# Patient Record
Sex: MALE | Race: BLACK OR AFRICAN AMERICAN | NOT HISPANIC OR LATINO | Employment: UNEMPLOYED | URBAN - METROPOLITAN AREA
[De-identification: names, ages, dates, MRNs, and addresses within clinical notes are randomized per-mention and may not be internally consistent; named-entity substitution may affect disease eponyms.]

---

## 2022-09-27 ENCOUNTER — HOSPITAL ENCOUNTER (EMERGENCY)
Facility: HOSPITAL | Age: 67
Discharge: HOME | End: 2022-09-27
Attending: FAMILY MEDICINE
Payer: MEDICARE

## 2022-09-27 ENCOUNTER — OFFICE VISIT (OUTPATIENT)
Dept: URGENT CARE | Facility: CLINIC | Age: 67
End: 2022-09-27
Payer: MEDICARE

## 2022-09-27 VITALS
TEMPERATURE: 98 F | RESPIRATION RATE: 18 BRPM | HEART RATE: 70 BPM | OXYGEN SATURATION: 100 % | HEIGHT: 69 IN | WEIGHT: 154 LBS | SYSTOLIC BLOOD PRESSURE: 205 MMHG | BODY MASS INDEX: 22.81 KG/M2 | DIASTOLIC BLOOD PRESSURE: 107 MMHG

## 2022-09-27 VITALS
HEART RATE: 66 BPM | TEMPERATURE: 98 F | RESPIRATION RATE: 18 BRPM | BODY MASS INDEX: 22.85 KG/M2 | SYSTOLIC BLOOD PRESSURE: 186 MMHG | OXYGEN SATURATION: 100 % | HEIGHT: 69 IN | WEIGHT: 154.31 LBS | DIASTOLIC BLOOD PRESSURE: 95 MMHG

## 2022-09-27 DIAGNOSIS — R42 LIGHT HEADED: ICD-10-CM

## 2022-09-27 DIAGNOSIS — I10 HYPERTENSION, UNSPECIFIED TYPE: Primary | ICD-10-CM

## 2022-09-27 PROCEDURE — 99213 OFFICE O/P EST LOW 20 MIN: CPT | Mod: PBBFAC | Performed by: NURSE PRACTITIONER

## 2022-09-27 PROCEDURE — 99213 PR OFFICE/OUTPT VISIT, EST, LEVL III, 20-29 MIN: ICD-10-PCS | Mod: S$PBB,,, | Performed by: NURSE PRACTITIONER

## 2022-09-27 PROCEDURE — 99213 OFFICE O/P EST LOW 20 MIN: CPT | Mod: S$PBB,,, | Performed by: NURSE PRACTITIONER

## 2022-09-27 PROCEDURE — 99283 EMERGENCY DEPT VISIT LOW MDM: CPT | Mod: 27

## 2022-09-27 PROCEDURE — 25000003 PHARM REV CODE 250: Performed by: FAMILY MEDICINE

## 2022-09-27 RX ORDER — LISINOPRIL 40 MG/1
40 TABLET ORAL
COMMUNITY
Start: 2021-12-28 | End: 2022-09-27 | Stop reason: SDUPTHER

## 2022-09-27 RX ORDER — LISINOPRIL 40 MG/1
40 TABLET ORAL DAILY
Qty: 90 TABLET | Refills: 0 | Status: SHIPPED | OUTPATIENT
Start: 2022-09-27 | End: 2024-02-15 | Stop reason: SDUPTHER

## 2022-09-27 RX ORDER — ACETAMINOPHEN 500 MG
1000 TABLET ORAL
Status: COMPLETED | OUTPATIENT
Start: 2022-09-27 | End: 2022-09-27

## 2022-09-27 RX ORDER — LISINOPRIL 10 MG/1
40 TABLET ORAL
Status: COMPLETED | OUTPATIENT
Start: 2022-09-27 | End: 2022-09-27

## 2022-09-27 RX ADMIN — ACETAMINOPHEN 1000 MG: 500 TABLET ORAL at 10:09

## 2022-09-27 RX ADMIN — LISINOPRIL 40 MG: 10 TABLET ORAL at 10:09

## 2022-09-28 DIAGNOSIS — F17.200 NEEDS SMOKING CESSATION EDUCATION: ICD-10-CM

## 2022-09-28 NOTE — ED PROVIDER NOTES
Encounter Date: 9/27/2022       History     Chief Complaint   Patient presents with    Hypertension     HTN and headache. Ran out of meds x2-3 days     67-year-old gentleman presents emergency room with complaints of elevated blood pressure, currently out of his blood pressure medications for last 2-3 days.  Denies chest pain or shortness of breath.  Patient reports a headache currently 4/10 in intensity.  Nothing makes better or worse.    The history is provided by the patient.   Review of patient's allergies indicates:  No Known Allergies  Past Medical History:   Diagnosis Date    HTN (hypertension)      Past Surgical History:   Procedure Laterality Date    ORIF HIP FRACTURE Right      History reviewed. No pertinent family history.  Social History     Tobacco Use    Smoking status: Every Day     Packs/day: 1.00     Types: Cigarettes    Smokeless tobacco: Never   Substance Use Topics    Alcohol use: Not Currently    Drug use: Never     Review of Systems   Constitutional:  Negative for chills, fatigue and fever.   HENT:  Negative for ear pain, rhinorrhea and sore throat.    Eyes:  Negative for photophobia and pain.   Respiratory:  Negative for cough, shortness of breath and wheezing.    Cardiovascular:  Negative for chest pain.   Gastrointestinal:  Negative for abdominal pain, diarrhea, nausea and vomiting.   Genitourinary:  Negative for dysuria.   Neurological:  Positive for headaches. Negative for dizziness and weakness.   All other systems reviewed and are negative.    Physical Exam     Initial Vitals [09/27/22 2124]   BP Pulse Resp Temp SpO2   (!) 211/103 68 20 97.2 °F (36.2 °C) 100 %      MAP       --         Physical Exam    Nursing note and vitals reviewed.  Constitutional: He appears well-developed and well-nourished.   HENT:   Head: Normocephalic and atraumatic.   Eyes: EOM are normal. Pupils are equal, round, and reactive to light.   Neck: Neck supple.   Normal range of motion.  Cardiovascular:  Normal  rate, regular rhythm and normal heart sounds.     Exam reveals no gallop and no friction rub.       No murmur heard.  Pulmonary/Chest: Breath sounds normal. No respiratory distress.   Abdominal: Abdomen is soft. Bowel sounds are normal. He exhibits no distension. There is no abdominal tenderness.   Musculoskeletal:         General: Normal range of motion.      Cervical back: Normal range of motion and neck supple.     Neurological: He is alert and oriented to person, place, and time. He has normal strength.   Skin: Skin is warm and dry.   Psychiatric: He has a normal mood and affect. His behavior is normal. Judgment and thought content normal.       ED Course   Procedures  Labs Reviewed - No data to display         Imaging Results    None          Medications   lisinopriL tablet 40 mg (has no administration in time range)   acetaminophen tablet 1,000 mg (has no administration in time range)     Medical Decision Making:   Initial Assessment:   Patient currently in no acute distress; discussed with patient about obtain laboratory evaluation, patient does not desire.  Will schedule patient for follow-up in internal medicine clinic.                        Clinical Impression:   Final diagnoses:  [I10] Hypertension, unspecified type (Primary)      ED Disposition Condition    Discharge Stable          ED Prescriptions       Medication Sig Dispense Start Date End Date Auth. Provider    lisinopriL (PRINIVIL,ZESTRIL) 40 MG tablet Take 1 tablet (40 mg total) by mouth once daily. 90 tablet 9/27/2022 12/26/2022 Robert Tillman MD          Follow-up Information       Follow up With Specialties Details Why Contact Info Additional Information    [facility] - Emergency Dept Emergency Medicine  As needed, If symptoms worsen [address] [facility] [address] [phone]     [facility] - Internal Medicine Internal Medicine  Next available. [address] [facility]  [address]  [phone] Internal Medicine Clinic Entrance #1             Robert Tillman MD  09/27/22 6871

## 2022-09-28 NOTE — PROGRESS NOTES
"Subjective:       Patient ID: Heath Holder is a 67 y.o. male.    Vitals:  height is 5' 9" (1.753 m) and weight is 69.9 kg (154 lb). His temperature is 98.1 °F (36.7 °C). His blood pressure is 205/107 (abnormal) and his pulse is 70. His respiration is 18 and oxygen saturation is 100%.     Chief Complaint: Medication Refill    Patient is a 67-year-old male, here today for blood pressure medication refilled for high blood pressure.  Patient states he has been feeling lightheaded the past few days, has been off his medication over the past few days.  States he does have shortness of breath, but states that is at baseline.        Constitution: Negative.   Cardiovascular: Negative.    Respiratory:  Positive for cough.    Neurological:  Positive for light-headedness.     Objective:      Physical Exam   Constitutional: He is oriented to person, place, and time. He appears well-developed.   HENT:   Head: Normocephalic.   Eyes: Conjunctivae and EOM are normal. Pupils are equal, round, and reactive to light.   Neck: Neck supple.   Cardiovascular: Normal rate, regular rhythm and normal heart sounds.   Pulmonary/Chest: Effort normal and breath sounds normal.   Musculoskeletal: Normal range of motion.         General: Normal range of motion.   Neurological: He is alert and oriented to person, place, and time.   Skin: Skin is warm and dry.   Psychiatric: His behavior is normal.   Vitals reviewed.      Assessment:       1. Hypertension, unspecified type    2. Light headed              No visits with results within 1 Day(s) from this visit.   Latest known visit with results is:   No results found for any previous visit.        No results found.   Plan:           Discussed with patient, will transfer to ED for further evaluation treatment. Pt transferred via w/c by [facility] staff.   Hypertension, unspecified type  -     Refer to Emergency Dept.    Light headed  -     Refer to Emergency Dept.                       "

## 2022-10-06 ENCOUNTER — HOSPITAL ENCOUNTER (EMERGENCY)
Facility: HOSPITAL | Age: 67
Discharge: HOME | End: 2022-10-06
Attending: FAMILY MEDICINE
Payer: MEDICARE

## 2022-10-06 VITALS
WEIGHT: 155.19 LBS | DIASTOLIC BLOOD PRESSURE: 86 MMHG | HEART RATE: 57 BPM | RESPIRATION RATE: 18 BRPM | OXYGEN SATURATION: 98 % | HEIGHT: 69 IN | TEMPERATURE: 98 F | SYSTOLIC BLOOD PRESSURE: 167 MMHG | BODY MASS INDEX: 22.98 KG/M2

## 2022-10-06 DIAGNOSIS — B34.9 VIRAL SYNDROME: Primary | ICD-10-CM

## 2022-10-06 LAB
ALBUMIN SERPL-MCNC: 3.5 GM/DL (ref 3.4–4.8)
ALBUMIN/GLOB SERPL: 1.1 RATIO (ref 1.1–2)
ALP SERPL-CCNC: 104 UNIT/L (ref 40–150)
ALT SERPL-CCNC: 42 UNIT/L (ref 0–55)
APPEARANCE UR: CLEAR
AST SERPL-CCNC: 47 UNIT/L (ref 5–34)
BACTERIA #/AREA URNS AUTO: ABNORMAL /HPF
BASOPHILS # BLD AUTO: 0.03 X10(3)/MCL (ref 0–0.2)
BASOPHILS NFR BLD AUTO: 0.5 %
BILIRUB UR QL STRIP.AUTO: NEGATIVE MG/DL
BILIRUBIN DIRECT+TOT PNL SERPL-MCNC: 0.8 MG/DL
BUN SERPL-MCNC: 13.3 MG/DL (ref 8.4–25.7)
CALCIUM SERPL-MCNC: 9.7 MG/DL (ref 8.8–10)
CHLORIDE SERPL-SCNC: 100 MMOL/L (ref 98–107)
CO2 SERPL-SCNC: 32 MMOL/L (ref 23–31)
COLOR UR AUTO: YELLOW
CREAT SERPL-MCNC: 0.86 MG/DL (ref 0.73–1.18)
EOSINOPHIL # BLD AUTO: 0.05 X10(3)/MCL (ref 0–0.9)
EOSINOPHIL NFR BLD AUTO: 0.9 %
ERYTHROCYTE [DISTWIDTH] IN BLOOD BY AUTOMATED COUNT: 12 % (ref 11.5–17)
FLUAV AG UPPER RESP QL IA.RAPID: NOT DETECTED
FLUBV AG UPPER RESP QL IA.RAPID: NOT DETECTED
GFR SERPLBLD CREATININE-BSD FMLA CKD-EPI: >60 MLS/MIN/1.73/M2
GLOBULIN SER-MCNC: 3.3 GM/DL (ref 2.4–3.5)
GLUCOSE SERPL-MCNC: 131 MG/DL (ref 82–115)
GLUCOSE UR QL STRIP.AUTO: ABNORMAL MG/DL
HCT VFR BLD AUTO: 56 % (ref 42–52)
HGB BLD-MCNC: 18.4 GM/DL (ref 14–18)
HYALINE CASTS #/AREA URNS LPF: ABNORMAL /LPF
IMM GRANULOCYTES # BLD AUTO: 0.01 X10(3)/MCL (ref 0–0.04)
IMM GRANULOCYTES NFR BLD AUTO: 0.2 %
KETONES UR QL STRIP.AUTO: NEGATIVE MG/DL
LEUKOCYTE ESTERASE UR QL STRIP.AUTO: NEGATIVE UNIT/L
LYMPHOCYTES # BLD AUTO: 2.13 X10(3)/MCL (ref 0.6–4.6)
LYMPHOCYTES NFR BLD AUTO: 38.8 %
MCH RBC QN AUTO: 30.7 PG (ref 27–31)
MCHC RBC AUTO-ENTMCNC: 32.9 MG/DL (ref 33–36)
MCV RBC AUTO: 93.3 FL (ref 80–94)
MONOCYTES # BLD AUTO: 0.63 X10(3)/MCL (ref 0.1–1.3)
MONOCYTES NFR BLD AUTO: 11.5 %
MUCOUS THREADS URNS QL MICRO: ABNORMAL /LPF
NEUTROPHILS # BLD AUTO: 2.6 X10(3)/MCL (ref 2.1–9.2)
NEUTROPHILS NFR BLD AUTO: 48.1 %
NITRITE UR QL STRIP.AUTO: NEGATIVE
NRBC BLD AUTO-RTO: 0 %
PH UR STRIP.AUTO: 7 [PH]
PLATELET # BLD AUTO: 166 X10(3)/MCL (ref 130–400)
PMV BLD AUTO: 10.8 FL (ref 7.4–10.4)
POTASSIUM SERPL-SCNC: 4.9 MMOL/L (ref 3.5–5.1)
PROT SERPL-MCNC: 6.8 GM/DL (ref 5.8–7.6)
PROT UR QL STRIP.AUTO: ABNORMAL MG/DL
RBC # BLD AUTO: 6 X10(6)/MCL (ref 4.7–6.1)
RBC #/AREA URNS AUTO: ABNORMAL /HPF
RBC UR QL AUTO: NEGATIVE UNIT/L
SARS-COV-2 RNA RESP QL NAA+PROBE: NOT DETECTED
SODIUM SERPL-SCNC: 140 MMOL/L (ref 136–145)
SP GR UR STRIP.AUTO: 1.02
SQUAMOUS #/AREA URNS LPF: ABNORMAL /HPF
TROPONIN I SERPL-MCNC: 0.01 NG/ML (ref 0–0.04)
UROBILINOGEN UR STRIP-ACNC: ABNORMAL MG/DL
WBC # SPEC AUTO: 5.5 X10(3)/MCL (ref 4.5–11.5)
WBC #/AREA URNS AUTO: ABNORMAL /HPF

## 2022-10-06 PROCEDURE — 93005 ELECTROCARDIOGRAM TRACING: CPT

## 2022-10-06 PROCEDURE — 0241U COVID/FLU A&B PCR: CPT | Performed by: PHYSICIAN ASSISTANT

## 2022-10-06 PROCEDURE — 99285 EMERGENCY DEPT VISIT HI MDM: CPT | Mod: 25,CS

## 2022-10-06 PROCEDURE — 84484 ASSAY OF TROPONIN QUANT: CPT | Performed by: PHYSICIAN ASSISTANT

## 2022-10-06 PROCEDURE — 85610 PROTHROMBIN TIME: CPT | Performed by: PHYSICIAN ASSISTANT

## 2022-10-06 PROCEDURE — 85025 COMPLETE CBC W/AUTO DIFF WBC: CPT | Performed by: PHYSICIAN ASSISTANT

## 2022-10-06 PROCEDURE — 36415 COLL VENOUS BLD VENIPUNCTURE: CPT | Performed by: PHYSICIAN ASSISTANT

## 2022-10-06 PROCEDURE — 25000003 PHARM REV CODE 250: Performed by: PHYSICIAN ASSISTANT

## 2022-10-06 PROCEDURE — 80053 COMPREHEN METABOLIC PANEL: CPT | Performed by: PHYSICIAN ASSISTANT

## 2022-10-06 PROCEDURE — 81001 URINALYSIS AUTO W/SCOPE: CPT | Performed by: PHYSICIAN ASSISTANT

## 2022-10-06 RX ORDER — ONDANSETRON 4 MG/1
4 TABLET, ORALLY DISINTEGRATING ORAL EVERY 8 HOURS PRN
Qty: 9 TABLET | Refills: 0 | Status: SHIPPED | OUTPATIENT
Start: 2022-10-06 | End: 2022-10-09

## 2022-10-06 RX ORDER — METHOCARBAMOL 500 MG/1
500 TABLET, FILM COATED ORAL 3 TIMES DAILY
Qty: 9 TABLET | Refills: 0 | Status: SHIPPED | OUTPATIENT
Start: 2022-10-06 | End: 2022-10-09

## 2022-10-06 RX ORDER — ONDANSETRON 4 MG/1
4 TABLET, ORALLY DISINTEGRATING ORAL
Status: COMPLETED | OUTPATIENT
Start: 2022-10-06 | End: 2022-10-06

## 2022-10-06 RX ORDER — HYDROCODONE BITARTRATE AND ACETAMINOPHEN 5; 325 MG/1; MG/1
1 TABLET ORAL ONCE
Status: COMPLETED | OUTPATIENT
Start: 2022-10-06 | End: 2022-10-06

## 2022-10-06 RX ADMIN — ONDANSETRON 4 MG: 4 TABLET, ORALLY DISINTEGRATING ORAL at 01:10

## 2022-10-06 RX ADMIN — HYDROCODONE BITARTRATE AND ACETAMINOPHEN 1 TABLET: 5; 325 TABLET ORAL at 01:10

## 2022-10-06 NOTE — ED PROVIDER NOTES
Encounter Date: 10/6/2022       History     Chief Complaint   Patient presents with    Nausea     Nausea, diarrhea, dyspnea since yesterday.    Diarrhea    Shortness of Breath     Patient reports nausea, diarrhea, and some sob for the past x2 days - pt reports his wife was recently diagnosed with covid    The history is provided by the patient.   Nausea  This is a new problem. The current episode started yesterday. The problem has not changed since onset.Pertinent negatives include no chest pain.   Review of patient's allergies indicates:  No Known Allergies  Past Medical History:   Diagnosis Date    Diabetes mellitus     HTN (hypertension)      Past Surgical History:   Procedure Laterality Date    ORIF HIP FRACTURE Right      History reviewed. No pertinent family history.  Social History     Tobacco Use    Smoking status: Every Day     Packs/day: 0.75     Types: Cigarettes    Smokeless tobacco: Never   Substance Use Topics    Alcohol use: Not Currently    Drug use: Never     Review of Systems   Constitutional:  Negative for fever.   Eyes: Negative.    Cardiovascular:  Negative for chest pain.   Genitourinary:  Negative for dysuria.   Musculoskeletal:  Negative for back pain.   Skin:  Negative for rash.   Neurological:  Negative for weakness.   Hematological:  Does not bruise/bleed easily.   Psychiatric/Behavioral: Negative.       Physical Exam     Initial Vitals [10/06/22 0945]   BP Pulse Resp Temp SpO2   (!) 203/85 69 18 98.1 °F (36.7 °C) 100 %      MAP       --         Physical Exam    Vitals reviewed.  Constitutional: He appears well-developed.   HENT:   Head: Normocephalic and atraumatic.   Nose: Nose normal.   Eyes: Conjunctivae and EOM are normal. Pupils are equal, round, and reactive to light.   Neck:   Normal range of motion.  Cardiovascular:  Normal rate, regular rhythm and normal heart sounds.           Pulmonary/Chest: Breath sounds normal. He exhibits no tenderness.   Abdominal: Abdomen is soft. Bowel  sounds are normal. He exhibits no distension. There is no abdominal tenderness. There is no rebound and no guarding.   Musculoskeletal:         General: Normal range of motion.      Cervical back: Normal range of motion.     Neurological: He is alert and oriented to person, place, and time. He displays normal reflexes. No cranial nerve deficit or sensory deficit. GCS score is 15. GCS eye subscore is 4. GCS verbal subscore is 5. GCS motor subscore is 6.   Skin: Skin is warm. No pallor.   Psychiatric: He has a normal mood and affect. His behavior is normal. Judgment and thought content normal.       ED Course   Procedures  Labs Reviewed   COMPREHENSIVE METABOLIC PANEL - Abnormal; Notable for the following components:       Result Value    Carbon Dioxide 32 (*)     Glucose Level 131 (*)     Aspartate Aminotransferase 47 (*)     All other components within normal limits   URINALYSIS, REFLEX TO URINE CULTURE - Abnormal; Notable for the following components:    Protein, UA Trace (*)     Glucose, UA 3+ (*)     Urobilinogen, UA 2+ (*)     Squamous Epithelial Cells, UA Trace (*)     Mucous, UA Trace (*)     All other components within normal limits   CBC WITH DIFFERENTIAL - Abnormal; Notable for the following components:    Hgb 18.4 (*)     Hct 56.0 (*)     MCHC 32.9 (*)     MPV 10.8 (*)     All other components within normal limits   COVID/FLU A&B PCR - Normal   TROPONIN I - Normal   CBC W/ AUTO DIFFERENTIAL    Narrative:     The following orders were created for panel order CBC auto differential.  Procedure                               Abnormality         Status                     ---------                               -----------         ------                     CBC with Differential[[id number]]        Abnormal            Final result                 Please view results for these tests on the individual orders.   PROTIME-INR   EXTRA TUBES    Narrative:     The following orders were created for panel order EXTRA  TUBES.  Procedure                               Abnormality         Status                     ---------                               -----------         ------                     Red Top Hold[[id number]]                                     In process                   Please view results for these tests on the individual orders.   RED TOP HOLD        ECG Results              EKG 12-lead (Final result)  Result time 10/06/22 13:42:04      Final result by Interface, Lab [facility] (10/06/22 13:42:04)                   Narrative:    Test Reason : R06.02,    Vent. Rate : 059 BPM     Atrial Rate : 059 BPM     P-R Int : 124 ms          QRS Dur : 088 ms      QT Int : 398 ms       P-R-T Axes : 085 083 075 degrees     QTc Int : 394 ms    Sinus bradycardia  Possible Left atrial enlargement  Borderline Abnormal ECG  No previous ECGs available  Confirmed by Bertha Scales MD ([id number]) on 10/6/2022 1:41:54 PM    Referred By: [email or web address]   SELF           Confirmed By:Bertha Scales MD                                  Imaging Results              XR ABDOMEN  ACUTE 2 OR MORE VIEWS WITH CHEST (Final result)  Result time 10/06/22 13:08:55      Final result by Pedro Garcia MD (10/06/22 13:08:55)                   Impression:      No acute findings identified.      Electronically signed by: Pedro Garcia  Date:    10/06/2022  Time:    13:08               Narrative:    EXAMINATION:  XR ABDOMEN ACUTE 2 OR MORE VIEWS WITH CHEST    CLINICAL HISTORY:  nausea/diarrhea/sob;    TECHNIQUE:  Two-view    COMPARISON:  August 23, 2011    FINDINGS:  Cardiopericardial silhouette is within normal limits.  Lungs are well expanded and clear and there is no fluid within the pleural spaces.  The intestinal gas pattern is nonspecific and nonobstructive. No air fluid levels or pneumoperitoneum identified.  Left hip operative changes.                                       Medications   ondansetron disintegrating tablet 4 mg (4 mg Oral Given 10/6/22 5116)    HYDROcodone-acetaminophen 5-325 mg per tablet 1 tablet (1 tablet Oral Given 10/6/22 1356)                 ED Course as of 10/06/22 1439   Thu Oct 06, 2022   1425 Patient reports no symptoms at discharge and reports he is hungry [AL]      ED Course User Index  [AL] HENRRY You                 Clinical Impression:   Final diagnoses:  [B34.9] Viral syndrome (Primary)        ED Disposition Condition    Discharge Stable          ED Prescriptions       Medication Sig Dispense Start Date End Date Auth. Provider    ondansetron (ZOFRAN-ODT) 4 MG TbDL Take 1 tablet (4 mg total) by mouth every 8 (eight) hours as needed (nausea). 9 tablet 10/6/2022 10/9/2022 HENRRY You    methocarbamoL (ROBAXIN) 500 MG Tab Take 1 tablet (500 mg total) by mouth 3 (three) times daily. for 3 days 9 tablet 10/6/2022 10/9/2022 HENRRY You          Follow-up Information       Follow up With Specialties Details Why Contact Info    discharge followup    If your symptoms become WORSE or you DO NOT IMPROVE and you are unable to reach your health care provider, you should RETURN to the emergency department    discharge info    Discussed all pertinent ED information, results, diagnosis and treatment plan; All questions and concerns were addressed at this time. Patient voices understanding of information and instructions. Patient is comfortable with plan and discharge             HNERRY You  10/06/22 1434       HENRRY You  10/06/22 1431

## 2023-08-16 LAB
INR PPP: 0.9
PROTHROMBIN TIME: 12.1 SECONDS (ref 11.4–14)

## 2024-02-15 ENCOUNTER — OFFICE VISIT (OUTPATIENT)
Dept: INTERNAL MEDICINE | Facility: CLINIC | Age: 69
End: 2024-02-15
Payer: MEDICARE

## 2024-02-15 VITALS
SYSTOLIC BLOOD PRESSURE: 144 MMHG | DIASTOLIC BLOOD PRESSURE: 80 MMHG | BODY MASS INDEX: 20.73 KG/M2 | TEMPERATURE: 99 F | RESPIRATION RATE: 18 BRPM | HEIGHT: 69 IN | WEIGHT: 140 LBS | HEART RATE: 78 BPM

## 2024-02-15 DIAGNOSIS — I10 PRIMARY HYPERTENSION: Primary | ICD-10-CM

## 2024-02-15 DIAGNOSIS — Z00.00 WELL ADULT EXAM: ICD-10-CM

## 2024-02-15 DIAGNOSIS — R74.8 ELEVATED LIVER ENZYMES: ICD-10-CM

## 2024-02-15 DIAGNOSIS — Z12.5 PROSTATE CANCER SCREENING: ICD-10-CM

## 2024-02-15 DIAGNOSIS — G89.29 CHRONIC LEFT-SIDED LOW BACK PAIN WITH LEFT-SIDED SCIATICA: ICD-10-CM

## 2024-02-15 DIAGNOSIS — R73.9 HYPERGLYCEMIA: ICD-10-CM

## 2024-02-15 DIAGNOSIS — E11.9 CONTROLLED TYPE 2 DIABETES MELLITUS WITHOUT COMPLICATION, WITHOUT LONG-TERM CURRENT USE OF INSULIN: ICD-10-CM

## 2024-02-15 DIAGNOSIS — M54.42 CHRONIC LEFT-SIDED LOW BACK PAIN WITH LEFT-SIDED SCIATICA: ICD-10-CM

## 2024-02-15 DIAGNOSIS — R79.89 ABNORMAL CBC: ICD-10-CM

## 2024-02-15 PROBLEM — M54.9 CHRONIC BACK PAIN: Status: ACTIVE | Noted: 2024-02-15

## 2024-02-15 PROCEDURE — 99214 OFFICE O/P EST MOD 30 MIN: CPT | Mod: PBBFAC | Performed by: NURSE PRACTITIONER

## 2024-02-15 PROCEDURE — 99214 OFFICE O/P EST MOD 30 MIN: CPT | Mod: S$PBB,,, | Performed by: NURSE PRACTITIONER

## 2024-02-15 RX ORDER — LISINOPRIL 40 MG/1
40 TABLET ORAL DAILY
Qty: 90 TABLET | Refills: 1 | Status: SHIPPED | OUTPATIENT
Start: 2024-02-15 | End: 2024-06-17 | Stop reason: SDUPTHER

## 2024-02-15 RX ORDER — DICLOFENAC SODIUM 75 MG/1
75 TABLET, DELAYED RELEASE ORAL 2 TIMES DAILY PRN
Qty: 60 TABLET | Refills: 6 | OUTPATIENT
Start: 2024-02-15 | End: 2024-03-30

## 2024-02-15 RX ORDER — METFORMIN HYDROCHLORIDE 1000 MG/1
1000 TABLET ORAL
Qty: 90 TABLET | Refills: 1 | Status: SHIPPED | OUTPATIENT
Start: 2024-02-15 | End: 2024-03-18 | Stop reason: SDUPTHER

## 2024-02-15 RX ORDER — METFORMIN HYDROCHLORIDE 1000 MG/1
1000 TABLET ORAL
COMMUNITY
End: 2024-02-15 | Stop reason: SDUPTHER

## 2024-02-15 NOTE — PROGRESS NOTES
Patient ID: [id number]     Chief Complaint: Establish Care        HPI:     HPI      Heath Holder is a 68 y.o. male here today to establish care. Pt has hx HTN, DM, hx of chronic low back pain with sciatica on left side.  Pt has hx of left hip pins due to injury in past. Pt states he has hx of back  injury in the past.  Pt requesting script for handicapped tags. Informed pt I have to have proof of requirement required by DMV to issue handicap tag paperwork. Will address at next visit once XR obtained for chronic back pain.           ----------------------------  Chronic low back pain with left-sided sciatica  Diabetes mellitus  HTN (hypertension)     Past Surgical History:   Procedure Laterality Date    HIP SURGERY Left     ORIF HIP FRACTURE Right     >10 years       Review of patient's allergies indicates:  No Known Allergies    Current Outpatient Medications   Medication Instructions    lisinopriL (PRINIVIL,ZESTRIL) 40 mg, Oral, Daily    metFORMIN (GLUCOPHAGE) 1,000 mg, Oral, With breakfast       Social History     Socioeconomic History    Marital status:    Tobacco Use    Smoking status: Every Day     Current packs/day: 0.50     Average packs/day: 0.5 packs/day for 52.1 years (26.1 ttl pk-yrs)     Types: Cigarettes     Start date: 1/1/1972    Smokeless tobacco: Never   Substance and Sexual Activity    Alcohol use: Not Currently    Drug use: Never     Social Determinants of Health     Financial Resource Strain: Low Risk  (2/15/2024)    Overall Financial Resource Strain (CARDIA)     Difficulty of Paying Living Expenses: Not hard at all   Food Insecurity: No Food Insecurity (2/15/2024)    Hunger Vital Sign     Worried About Running Out of Food in the Last Year: Never true     Ran Out of Food in the Last Year: Never true   Transportation Needs: No Transportation Needs (2/15/2024)    PRAPARE - Transportation     Lack of Transportation (Medical): No     Lack of Transportation (Non-Medical): No   Physical  Activity: Sufficiently Active (2/15/2024)    Exercise Vital Sign     Days of Exercise per Week: 5 days     Minutes of Exercise per Session: 50 min   Stress: No Stress Concern Present (2/15/2024)    [employer] - Occupational Stress Questionnaire     Feeling of Stress : Not at all   Social Connections: Moderately Isolated (2/15/2024)    Social Connection and Isolation Panel [NHANES]     Frequency of Communication with Friends and Family: More than three times a week     Frequency of Social Gatherings with Friends and Family: More than three times a week     Attends Mandaeism Services: Never     Active Member of Clubs or Organizations: No     Attends Club or Organization Meetings: Never     Marital Status:    Housing Stability: Low Risk  (2/15/2024)    Housing Stability Vital Sign     Unable to Pay for Housing in the Last Year: No     Number of Places Lived in the Last Year: 1     Unstable Housing in the Last Year: No        Family History   Problem Relation Age of Onset    No Known Problems Mother     Diabetes Father         Patient Care Team:  Marilu Amezquita FNP as PCP - General (Family Medicine)     Subjective:     Review of Systems     See HPI for details    Constitutional: Denies Change in appetite. Denies Chills. Denies Fever. Denies Night sweats.  Eye: Denies Blurred vision. Denies Discharge. Denies Eye pain.  ENT: Denies Decreased hearing. Denies Sore throat. Denies Swollen glands.  Respiratory: Denies Cough. Denies Shortness of breath. Denies Shortness of breath with exertion. Denies Wheezing.  Cardiovascular: DeniesChest pain at rest. Denies Chest pain with exertion. Denies Irregular heartbeat. Denies Palpitations. Denies Edema.  Gastrointestinal: Denies Abdominal pain. DeniesDiarrhea. Denies Nausea. Denies Vomiting. Denies Hematemesis or Hematochezia.  Genitourinary: Denies Dysuria. Denies Urinary frequency. Denies Urinary urgency. Denies Blood in urine.  Endocrine:  "Denies Cold intolerance. Denies Excessive thirst. Denies Heat intolerance. Denies Weight loss. Denies Weight gain.  Musculoskeletal: Denies Painful joints. Denies Weakness.  Integumentary: Denies Rash. Denies Itching. Denies Dry skin.  Neurologic: Denies Dizziness. Denies Fainting. Denies Headache.  Psychiatric: Denies Depression. Denies Anxiety. Denies Suicidal/Homicidal ideations.    All Other ROS: Negative except as stated in HPI.       Objective:     Visit Vitals  BP (!) 144/80 (BP Location: Left arm, Patient Position: Sitting, BP Method: Large (Manual))   Pulse 78   Temp 98.5 °F (36.9 °C) (Oral)   Resp 18   Ht 5' 9" (1.753 m)   Wt 63.5 kg (140 lb)   BMI 20.67 kg/m²       Physical Exam    General: Alert and oriented, No acute distress.  Head: Normocephalic, Atraumatic.  Eye: Pupils are equal, round and reactive to light, Extraocular movements are intact, Sclera non-icteric.  Ears/Nose/Throat: Normal, Mucosa moist,Clear.  Neck/Thyroid: Supple, Non-tender, No carotid bruit, No lymphadenopathy, No JVD, Full range of motion.  Respiratory: Clear to auscultation bilaterally; No wheezes, rales or rhonchi,Non-labored respirations, Symmetrical chest wall expansion.  Cardiovascular: Regular rate and rhythm, S1/S2 normal, No murmurs, rubs or gallops.  Gastrointestinal: Soft, Non-tender, Non-distended, Normal bowel sounds, No palpable organomegaly.  Musculoskeletal: Normal range of motion.  Integumentary: Warm, Dry, Intact, No suspicious lesions or rashes.  Extremities: No clubbing, cyanosis or edema  Neurologic: No focal deficits, Cranial Nerves II-XII are grossly intact, Motor strength normal upper and lower extremities, Sensory exam intact.  Psychiatric: Normal interaction, Coherent speech, Euthymic mood, Appropriate affect       Labs Reviewed:     Chemistry:  Lab Results   Component Value Date     10/06/2022    K 4.9 10/06/2022    CHLORIDE 100 10/06/2022    BUN 13.3 10/06/2022    CREATININE 0.86 10/06/2022    " "EGFRNORACEVR >60 10/06/2022    GLUCOSE 131 (H) 10/06/2022    CALCIUM 9.7 10/06/2022    ALKPHOS 104 10/06/2022    LABPROT 6.8 10/06/2022    ALBUMIN 3.5 10/06/2022    AST 47 (H) 10/06/2022    ALT 42 10/06/2022        No results found for: "HGBA1C", "MICROALBCREA"     Hematology:  Lab Results   Component Value Date    WBC 5.5 10/06/2022    HGB 18.4 (H) 10/06/2022    HCT 56.0 (H) 10/06/2022     10/06/2022       Lipid Panel:  No results found for: "CHOL", "HDL", "LDL", "TRIG", "TOTALCHOLEST"     Urine:  Lab Results   Component Value Date    COLORUA Yellow 10/06/2022    APPEARANCEUA Clear 10/06/2022    SGUA 1.021 10/06/2022    PHUA 7.0 10/06/2022    PROTEINUA Trace (A) 10/06/2022    GLUCOSEUA 3+ (A) 10/06/2022    KETONESUA Negative 10/06/2022    BLOODUA Negative 10/06/2022    NITRITESUA Negative 10/06/2022    LEUKOCYTESUR Negative 10/06/2022    RBCUA 0-5 10/06/2022    WBCUA 0-5 10/06/2022    BACTERIA None Seen 10/06/2022    SQEPUA Trace (A) 10/06/2022    HYALINECASTS None Seen 10/06/2022        Assessment:       ICD-10-CM ICD-9-CM   1. Primary hypertension  I10 401.9   2. Abnormal CBC  R79.89 790.6   3. Hyperglycemia  R73.9 790.29   4. Elevated liver enzymes  R74.8 790.5   5. Well adult exam  Z00.00 V70.0   6. Controlled type 2 diabetes mellitus without complication, without long-term current use of insulin  E11.9 250.00   7. Prostate cancer screening  Z12.5 V76.44   8. Chronic left-sided low back pain with left-sided sciatica  M54.42 724.2    G89.29 724.3     338.29        Plan:     1. Primary hypertension  BP elevated. Low fat low salt diet and exercise. Cont Lisinopril as prescribed. Pt states he did not take BP med this AM. Instructed to take as prescribed. Refill sent to [facility] pharmacy.     2. Abnormal CBC  CBC in 1 month.     3. Hyperglycemia  ADA diet and exercise. A1c in 1 month.     4. Elevated liver enzymes  AST 47. Low fat diet and exercise encouraged. Avoid Tylenol and ETOH. CMP in 1 month.     5. Well " adult exam  Labs in 1 month. PSA in 1 month.     6. Controlled type 2 diabetes mellitus without complication, without long-term current use of insulin  A1c in 1 month. ADA diet and exercise. Cont Metformin as prescribed. Urine microalbumin in 1 month. Will address DM foot and eye exam on next visit.          Follow up in about 1 month (around 3/15/2024) for with labs 1 week prior to appt. . In addition to their scheduled follow up, the patient has also been instructed to follow up on as needed basis.     No future appointments.     Marilu Amezquita, RAY

## 2024-03-18 ENCOUNTER — HOSPITAL ENCOUNTER (OUTPATIENT)
Dept: RADIOLOGY | Facility: HOSPITAL | Age: 69
Discharge: HOME | End: 2024-03-18
Attending: NURSE PRACTITIONER
Payer: MEDICARE

## 2024-03-18 ENCOUNTER — OFFICE VISIT (OUTPATIENT)
Dept: INTERNAL MEDICINE | Facility: CLINIC | Age: 69
End: 2024-03-18
Payer: MEDICARE

## 2024-03-18 VITALS
HEART RATE: 75 BPM | BODY MASS INDEX: 20.57 KG/M2 | TEMPERATURE: 99 F | WEIGHT: 138.88 LBS | SYSTOLIC BLOOD PRESSURE: 158 MMHG | RESPIRATION RATE: 18 BRPM | DIASTOLIC BLOOD PRESSURE: 90 MMHG | HEIGHT: 69 IN

## 2024-03-18 DIAGNOSIS — Z12.11 COLON CANCER SCREENING: ICD-10-CM

## 2024-03-18 DIAGNOSIS — Z12.5 PROSTATE CANCER SCREENING: ICD-10-CM

## 2024-03-18 DIAGNOSIS — E11.65 UNCONTROLLED TYPE 2 DIABETES MELLITUS WITH HYPERGLYCEMIA: ICD-10-CM

## 2024-03-18 DIAGNOSIS — Z00.00 WELL ADULT EXAM: ICD-10-CM

## 2024-03-18 DIAGNOSIS — R73.9 HYPERGLYCEMIA: ICD-10-CM

## 2024-03-18 DIAGNOSIS — G89.29 CHRONIC LEFT-SIDED LOW BACK PAIN WITH LEFT-SIDED SCIATICA: ICD-10-CM

## 2024-03-18 DIAGNOSIS — M54.42 CHRONIC LEFT-SIDED LOW BACK PAIN WITH LEFT-SIDED SCIATICA: ICD-10-CM

## 2024-03-18 DIAGNOSIS — B19.20 HEPATITIS C TEST POSITIVE: ICD-10-CM

## 2024-03-18 DIAGNOSIS — I10 PRIMARY HYPERTENSION: Primary | ICD-10-CM

## 2024-03-18 DIAGNOSIS — R74.8 ELEVATED LIVER ENZYMES: ICD-10-CM

## 2024-03-18 DIAGNOSIS — E11.9 CONTROLLED TYPE 2 DIABETES MELLITUS WITHOUT COMPLICATION, WITHOUT LONG-TERM CURRENT USE OF INSULIN: ICD-10-CM

## 2024-03-18 DIAGNOSIS — K21.9 GASTROESOPHAGEAL REFLUX DISEASE, UNSPECIFIED WHETHER ESOPHAGITIS PRESENT: ICD-10-CM

## 2024-03-18 DIAGNOSIS — R79.89 ABNORMAL CBC: ICD-10-CM

## 2024-03-18 PROCEDURE — 72100 X-RAY EXAM L-S SPINE 2/3 VWS: CPT | Mod: TC

## 2024-03-18 PROCEDURE — 99214 OFFICE O/P EST MOD 30 MIN: CPT | Mod: S$PBB,,, | Performed by: NURSE PRACTITIONER

## 2024-03-18 PROCEDURE — 99215 OFFICE O/P EST HI 40 MIN: CPT | Mod: PBBFAC,25 | Performed by: NURSE PRACTITIONER

## 2024-03-18 PROCEDURE — 73502 X-RAY EXAM HIP UNI 2-3 VIEWS: CPT | Mod: TC,LT

## 2024-03-18 RX ORDER — PANTOPRAZOLE SODIUM 20 MG/1
20 TABLET, DELAYED RELEASE ORAL DAILY
Qty: 30 TABLET | Refills: 6 | Status: ON HOLD | OUTPATIENT
Start: 2024-03-18 | End: 2024-04-12 | Stop reason: HOSPADM

## 2024-03-18 RX ORDER — METFORMIN HYDROCHLORIDE 1000 MG/1
1000 TABLET ORAL 2 TIMES DAILY WITH MEALS
Qty: 180 TABLET | Refills: 1 | Status: SHIPPED | OUTPATIENT
Start: 2024-03-18 | End: 2024-06-17 | Stop reason: SDUPTHER

## 2024-03-18 NOTE — PROGRESS NOTES
Patient ID: [id number]     Chief Complaint: lab results        HPI:     HPI      Heath Holder is a 68 y.o. male here today for a follow up. Pt c/o stomach bloating after meals- states lying down at night makes symptoms worse.           ----------------------------  Chronic low back pain with left-sided sciatica  Diabetes mellitus  HTN (hypertension)     Past Surgical History:   Procedure Laterality Date    HIP SURGERY Left     ORIF HIP FRACTURE Right     >10 years       Review of patient's allergies indicates:  No Known Allergies    Current Outpatient Medications   Medication Instructions    diclofenac (VOLTAREN) 75 mg, Oral, 2 times daily PRN    lisinopriL (PRINIVIL,ZESTRIL) 40 mg, Oral, Daily    metFORMIN (GLUCOPHAGE) 1,000 mg, Oral, 2 times daily with meals    pantoprazole (PROTONIX) 20 mg, Oral, Daily       Social History     Socioeconomic History    Marital status:    Tobacco Use    Smoking status: Every Day     Current packs/day: 0.50     Average packs/day: 0.5 packs/day for 52.2 years (26.1 ttl pk-yrs)     Types: Cigarettes     Start date: 1/1/1972    Smokeless tobacco: Never   Substance and Sexual Activity    Alcohol use: Not Currently    Drug use: Never     Social Determinants of Health     Financial Resource Strain: Low Risk  (2/15/2024)    Overall Financial Resource Strain (CARDIA)     Difficulty of Paying Living Expenses: Not hard at all   Food Insecurity: No Food Insecurity (2/15/2024)    Hunger Vital Sign     Worried About Running Out of Food in the Last Year: Never true     Ran Out of Food in the Last Year: Never true   Transportation Needs: No Transportation Needs (2/15/2024)    PRAPARE - Transportation     Lack of Transportation (Medical): No     Lack of Transportation (Non-Medical): No   Physical Activity: Sufficiently Active (2/15/2024)    Exercise Vital Sign     Days of Exercise per Week: 5 days     Minutes of Exercise per Session: 50 min   Stress: No Stress Concern Present (2/15/2024)     [facility] of Occupational Health - Occupational Stress Questionnaire     Feeling of Stress : Not at all   Social Connections: Moderately Isolated (2/15/2024)    Social Connection and Isolation Panel [NHANES]     Frequency of Communication with Friends and Family: More than three times a week     Frequency of Social Gatherings with Friends and Family: More than three times a week     Attends Sabianist Services: Never     Active Member of Clubs or Organizations: No     Attends Club or Organization Meetings: Never     Marital Status:    Housing Stability: Low Risk  (2/15/2024)    Housing Stability Vital Sign     Unable to Pay for Housing in the Last Year: No     Number of Places Lived in the Last Year: 1     Unstable Housing in the Last Year: No        Family History   Problem Relation Age of Onset    No Known Problems Mother     Diabetes Father         Patient Care Team:  Marilu Amezquita FNP as PCP - General (Family Medicine)     Subjective:     Review of Systems     See HPI for details    Constitutional: Denies Change in appetite. Denies Chills. Denies Fever. Denies Night sweats.  Eye: Denies Blurred vision. Denies Discharge. Denies Eye pain.  ENT: Denies Decreased hearing. Denies Sore throat. Denies Swollen glands.  Respiratory: Denies Cough. Denies Shortness of breath. Denies Shortness of breath with exertion. Denies Wheezing.  Cardiovascular: DeniesChest pain at rest. Denies Chest pain with exertion. Denies Irregular heartbeat. Denies Palpitations. Denies Edema.  Gastrointestinal: Denies Abdominal pain. DeniesDiarrhea. Denies Nausea. Denies Vomiting. Denies Hematemesis or Hematochezia.  Genitourinary: Denies Dysuria. Denies Urinary frequency. Denies Urinary urgency. Denies Blood in urine.  Endocrine: Denies Cold intolerance. Denies Excessive thirst. Denies Heat intolerance. Denies Weight loss. Denies Weight gain.  Musculoskeletal: Denies Painful joints. Denies Weakness.  Integumentary: Denies  "Rash. Denies Itching. Denies Dry skin.  Neurologic: Denies Dizziness. Denies Fainting. Denies Headache.  Psychiatric: Denies Depression. Denies Anxiety. Denies Suicidal/Homicidal ideations.    All Other ROS: Negative except as stated in HPI.       Objective:     Visit Vitals  BP (!) 158/90 (BP Location: Left arm, Patient Position: Sitting, BP Method: Large (Manual))   Pulse 75   Temp 98.5 °F (36.9 °C) (Oral)   Resp 18   Ht 5' 9" (1.753 m)   Wt 63 kg (138 lb 14.2 oz)   BMI 20.51 kg/m²       Physical Exam    General: Alert and oriented, No acute distress.  Head: Normocephalic, Atraumatic.  Eye: Pupils are equal, round and reactive to light, Extraocular movements are intact, Sclera non-icteric.  Ears/Nose/Throat: Normal, Mucosa moist,Clear.  Neck/Thyroid: Supple, Non-tender, No carotid bruit, No lymphadenopathy, No JVD, Full range of motion.  Respiratory: Clear to auscultation bilaterally; No wheezes, rales or rhonchi,Non-labored respirations, Symmetrical chest wall expansion.  Cardiovascular: Regular rate and rhythm, S1/S2 normal, No murmurs, rubs or gallops.  Gastrointestinal: Soft, Non-tender, Non-distended, Normal bowel sounds, No palpable organomegaly.  Musculoskeletal: Normal range of motion.  Integumentary: Warm, Dry, Intact, No suspicious lesions or rashes.  Extremities: No clubbing, cyanosis or edema  Neurologic: No focal deficits, Cranial Nerves II-XII are grossly intact, Motor strength normal upper and lower extremities, Sensory exam intact.  Psychiatric: Normal interaction, Coherent speech, Euthymic mood, Appropriate affect       Labs Reviewed:     Chemistry:  Lab Results   Component Value Date     03/18/2024    K 4.9 03/18/2024    CHLORIDE 101 03/18/2024    BUN 15.9 03/18/2024    CREATININE 0.79 03/18/2024    EGFRNORACEVR >60 03/18/2024    GLUCOSE 153 (H) 03/18/2024    CALCIUM 9.9 03/18/2024    ALKPHOS 100 03/18/2024    LABPROT 7.6 03/18/2024    ALBUMIN 4.0 03/18/2024    AST 49 (H) 03/18/2024    ALT " 59 (H) 03/18/2024        Lab Results   Component Value Date    HGBA1C 9.3 (H) 03/18/2024        Hematology:  Lab Results   Component Value Date    WBC 6.69 03/18/2024    HGB 18.8 (H) 03/18/2024    HCT 55.2 (H) 03/18/2024     03/18/2024       Lipid Panel:  Lab Results   Component Value Date    CHOL 160 03/18/2024    HDL 55 03/18/2024    LDL 92.00 03/18/2024    TRIG 67 03/18/2024    TOTALCHOLEST 3 03/18/2024        Urine:  Lab Results   Component Value Date    COLORUA Yellow 03/18/2024    APPEARANCEUA Turbid (A) 03/18/2024    SGUA 1.018 03/18/2024    PHUA 7.5 03/18/2024    PROTEINUA 1+ (A) 03/18/2024    GLUCOSEUA 2+ (A) 03/18/2024    KETONESUA Negative 03/18/2024    BLOODUA Negative 03/18/2024    NITRITESUA Negative 03/18/2024    LEUKOCYTESUR Negative 03/18/2024    RBCUA 0-5 03/18/2024    WBCUA 0-5 03/18/2024    BACTERIA Trace (A) 03/18/2024    SQEPUA Trace (A) 03/18/2024    HYALINECASTS None Seen 03/18/2024        Assessment:       ICD-10-CM ICD-9-CM   1. Primary hypertension  I10 401.9   2. Abnormal CBC  R79.89 790.6   3. Hyperglycemia  R73.9 790.29   4. Elevated liver enzymes  R74.8 790.5   5. Well adult exam  Z00.00 V70.0   6. Prostate cancer screening  Z12.5 V76.44   7. Chronic left-sided low back pain with left-sided sciatica  M54.42 724.2    G89.29 724.3     338.29   8. Uncontrolled type 2 diabetes mellitus with hyperglycemia  E11.65 250.02   9. Hepatitis C test positive  B19.20 070.70   10. Colon cancer screening  Z12.11 V76.51   11. Controlled type 2 diabetes mellitus without complication, without long-term current use of insulin  E11.9 250.00   12. Gastroesophageal reflux disease, unspecified whether esophagitis present  K21.9 530.81        Plan:     1. Primary hypertension  BP elevated. Low fat low salt diet and exercise. Pt did not take BP med this am. Cont Lisinopril as prescribed.  RTC in 1 month for BP check.     2. Abnormal CBC  CBC stable. Repeat CBC in 3 months.     3. Hyperglycemia  A1c 9.3.  ADA diet and exercise. Increase Metformin  to 1 tab po BID  with meals. A1c in 1 month.     4. Elevated liver enzymes  AST 49, alt 59. Low fat diet encouraged. Avoid Tylenol and ETOH. Will repeat CMP in 1 month.    5. Well adult exam  Labs in 1 month. UTD PSA.     6. Uncontrolled DM type 2 with  hyperglycemia  A1c 9.3. ADA diet and exercise. Increase Metformin  to 1 tab po BID  with meals. A1c in 1 month. Urine microalbumin in 1 month. DM foot exam done today. DM eye exam done today.     7. Prostate cancer screening  UTD PSA.     8. Chronic left-sided low back pain with left-sided sciatica  XR L-spine done today showing:  X-Ray Lumbar Spine 2 Or 3 Views  Order: [id number]  Status: Final result       Visible to patient: Yes (not seen)       Next appt: None       Dx: Chronic left-sided low back pain with...    0 Result Notes  Details    Reading Physician Reading Date Result Priority   Travis Del Angel MD  [phone] 3/18/2024 Routine     Narrative & Impression  EXAMINATION:  XR LUMBAR SPINE 2 OR 3 VIEWS     CPT: 59444     CLINICAL HISTORY:  Lumbago with sciatica, left side     FINDINGS:  There are 5 non rib-bearing vertebral bodies vertebral bodies are normal height, position and alignment some degenerative changes identified with marginal osteophytes at some levels as well as degenerative changes of the posterior elements at L4-L5 and L5-S1.     No acute fractures or dislocations identified     Impression:     Degenerative changes        Electronically signed by: Travis Del Angel  Date:                                            03/18/2024  Time:                                           07:12           Exam Ended: 03/18/24 07:04 CDT Last Resulted: 03/18/24 07:12 CDT           XR Left hip done today showing:  X-Ray Hip 2 or 3 views Left (with Pelvis when performed)  Order: [id number]  Status: Final result       Visible to patient: Yes (not seen)       Next appt: None       Dx: Chronic left-sided low back pain  with...    0 Result Notes  Details    Reading Physician Reading Date Result Priority   Travis Del Angel MD  [phone] 3/18/2024 Routine     Narrative & Impression  EXAMINATION:  XR HIP WITH PELVIS WHEN PERFORMED, 2 OR 3 VIEWS LEFT     CLINICAL HISTORY:  Lumbago with sciatica, left side     COMPARISON:  None.     FINDINGS:  No acute displaced fractures or dislocations.     Nail and plate and orthopedic screws identified some degenerative changes are identified of the inferior medial aspect of the hip joint articular spaces are otherwise preserved with smooth articular surfaces with some degenerative changes of the contralateral hip and lumbosacral spine     No blastic or lytic lesions.     Soft tissues within normal limits.     Impression:     Hardware in the left hip appears to be intact.     Degenerative changes.        Electronically signed by: Travis Del Angel  Date:                                            03/18/2024  Time:                                           07:11           Exam Ended: 03/18/24 07:02 CDT Last Resulted: 03/18/24 07:11 CDT           Cont Diclofenac as prescribed prn pain.      9. Hep C positive  Hep C quantitative lab in 1 month.  Abd US in 1 month. Avoid Tylenol or ETOH.     10. GERD  Avoid triggers. RX Pantoprazole 20 mg 1 tab po daily.     Follow up in about 1 month (around 4/18/2024) for with labs 1 week prior to appt. . In addition to their scheduled follow up, the patient has also been instructed to follow up on as needed basis.     Future Appointments   Date Time Provider Department Center   4/18/2024  2:20 PM Marilu Amezquita FNP [facility]        RAY Bautista

## 2024-03-27 ENCOUNTER — HOSPITAL ENCOUNTER (OUTPATIENT)
Dept: RADIOLOGY | Facility: HOSPITAL | Age: 69
Discharge: HOME | End: 2024-03-27
Attending: NURSE PRACTITIONER
Payer: MEDICARE

## 2024-03-27 DIAGNOSIS — B19.20 HEPATITIS C TEST POSITIVE: ICD-10-CM

## 2024-03-27 PROCEDURE — 76705 ECHO EXAM OF ABDOMEN: CPT | Mod: TC

## 2024-03-30 ENCOUNTER — HOSPITAL ENCOUNTER (EMERGENCY)
Facility: HOSPITAL | Age: 69
Discharge: HOME | End: 2024-03-30
Attending: STUDENT IN AN ORGANIZED HEALTH CARE EDUCATION/TRAINING PROGRAM
Payer: MEDICARE

## 2024-03-30 VITALS
RESPIRATION RATE: 18 BRPM | OXYGEN SATURATION: 99 % | BODY MASS INDEX: 19.92 KG/M2 | DIASTOLIC BLOOD PRESSURE: 99 MMHG | WEIGHT: 134.5 LBS | SYSTOLIC BLOOD PRESSURE: 186 MMHG | HEIGHT: 69 IN | HEART RATE: 75 BPM | TEMPERATURE: 98 F

## 2024-03-30 DIAGNOSIS — K80.20 CALCULUS OF GALLBLADDER WITHOUT CHOLECYSTITIS WITHOUT OBSTRUCTION: Primary | ICD-10-CM

## 2024-03-30 DIAGNOSIS — K59.00 CONSTIPATION, UNSPECIFIED CONSTIPATION TYPE: ICD-10-CM

## 2024-03-30 LAB
ALBUMIN SERPL-MCNC: 4 G/DL (ref 3.4–4.8)
ALBUMIN/GLOB SERPL: 1 RATIO (ref 1.1–2)
ALP SERPL-CCNC: 97 UNIT/L (ref 40–150)
ALT SERPL-CCNC: 57 UNIT/L (ref 0–55)
APPEARANCE UR: CLEAR
AST SERPL-CCNC: 44 UNIT/L (ref 5–34)
BACTERIA #/AREA URNS AUTO: ABNORMAL /HPF
BASOPHILS # BLD AUTO: 0.03 X10(3)/MCL
BASOPHILS NFR BLD AUTO: 0.4 %
BILIRUB SERPL-MCNC: 0.9 MG/DL
BILIRUB UR QL STRIP.AUTO: NEGATIVE
BUN SERPL-MCNC: 14.4 MG/DL (ref 8.4–25.7)
CALCIUM SERPL-MCNC: 10.5 MG/DL (ref 8.8–10)
CHLORIDE SERPL-SCNC: 101 MMOL/L (ref 98–107)
CO2 SERPL-SCNC: 28 MMOL/L (ref 23–31)
COLOR UR AUTO: YELLOW
CREAT SERPL-MCNC: 0.86 MG/DL (ref 0.73–1.18)
CRP SERPL-MCNC: <1 MG/L
EOSINOPHIL # BLD AUTO: 0.02 X10(3)/MCL (ref 0–0.9)
EOSINOPHIL NFR BLD AUTO: 0.3 %
ERYTHROCYTE [DISTWIDTH] IN BLOOD BY AUTOMATED COUNT: 11.6 % (ref 11.5–17)
GFR SERPLBLD CREATININE-BSD FMLA CKD-EPI: >60 MLS/MIN/1.73/M2
GLOBULIN SER-MCNC: 4.1 GM/DL (ref 2.4–3.5)
GLUCOSE SERPL-MCNC: 142 MG/DL (ref 82–115)
GLUCOSE UR QL STRIP.AUTO: ABNORMAL
HCT VFR BLD AUTO: 54.8 % (ref 42–52)
HGB BLD-MCNC: 18.7 G/DL (ref 14–18)
HOLD SPECIMEN: NORMAL
HYALINE CASTS #/AREA URNS LPF: ABNORMAL /LPF
IMM GRANULOCYTES # BLD AUTO: 0.01 X10(3)/MCL (ref 0–0.04)
IMM GRANULOCYTES NFR BLD AUTO: 0.1 %
KETONES UR QL STRIP.AUTO: NEGATIVE
LEUKOCYTE ESTERASE UR QL STRIP.AUTO: NEGATIVE
LIPASE SERPL-CCNC: 21 U/L
LYMPHOCYTES # BLD AUTO: 3.44 X10(3)/MCL (ref 0.6–4.6)
LYMPHOCYTES NFR BLD AUTO: 50.1 %
MCH RBC QN AUTO: 30.8 PG (ref 27–31)
MCHC RBC AUTO-ENTMCNC: 34.1 G/DL (ref 33–36)
MCV RBC AUTO: 90.1 FL (ref 80–94)
MONOCYTES # BLD AUTO: 0.7 X10(3)/MCL (ref 0.1–1.3)
MONOCYTES NFR BLD AUTO: 10.2 %
MUCOUS THREADS URNS QL MICRO: ABNORMAL /LPF
NEUTROPHILS # BLD AUTO: 2.67 X10(3)/MCL (ref 2.1–9.2)
NEUTROPHILS NFR BLD AUTO: 38.9 %
NITRITE UR QL STRIP.AUTO: NEGATIVE
NRBC BLD AUTO-RTO: 0 %
PH UR STRIP.AUTO: 7 [PH]
PLATELET # BLD AUTO: 244 X10(3)/MCL (ref 130–400)
PMV BLD AUTO: 10.3 FL (ref 7.4–10.4)
POTASSIUM SERPL-SCNC: 4.7 MMOL/L (ref 3.5–5.1)
PROT SERPL-MCNC: 8.1 GM/DL (ref 5.8–7.6)
PROT UR QL STRIP.AUTO: ABNORMAL
RBC # BLD AUTO: 6.08 X10(6)/MCL (ref 4.7–6.1)
RBC #/AREA URNS AUTO: ABNORMAL /HPF
RBC UR QL AUTO: NEGATIVE
SODIUM SERPL-SCNC: 139 MMOL/L (ref 136–145)
SP GR UR STRIP.AUTO: 1.02 (ref 1–1.03)
SQUAMOUS #/AREA URNS LPF: ABNORMAL /HPF
UROBILINOGEN UR STRIP-ACNC: ABNORMAL
WBC # SPEC AUTO: 6.87 X10(3)/MCL (ref 4.5–11.5)
WBC #/AREA URNS AUTO: ABNORMAL /HPF

## 2024-03-30 PROCEDURE — 80053 COMPREHEN METABOLIC PANEL: CPT | Performed by: PHYSICIAN ASSISTANT

## 2024-03-30 PROCEDURE — 99285 EMERGENCY DEPT VISIT HI MDM: CPT | Mod: 25

## 2024-03-30 PROCEDURE — 85025 COMPLETE CBC W/AUTO DIFF WBC: CPT | Performed by: PHYSICIAN ASSISTANT

## 2024-03-30 PROCEDURE — 81001 URINALYSIS AUTO W/SCOPE: CPT | Performed by: PHYSICIAN ASSISTANT

## 2024-03-30 PROCEDURE — 83690 ASSAY OF LIPASE: CPT | Performed by: PHYSICIAN ASSISTANT

## 2024-03-30 PROCEDURE — 96372 THER/PROPH/DIAG INJ SC/IM: CPT | Performed by: PHYSICIAN ASSISTANT

## 2024-03-30 PROCEDURE — 96374 THER/PROPH/DIAG INJ IV PUSH: CPT

## 2024-03-30 PROCEDURE — 63600175 PHARM REV CODE 636 W HCPCS: Performed by: PHYSICIAN ASSISTANT

## 2024-03-30 PROCEDURE — 25500020 PHARM REV CODE 255: Performed by: PHYSICIAN ASSISTANT

## 2024-03-30 PROCEDURE — 86140 C-REACTIVE PROTEIN: CPT | Performed by: PHYSICIAN ASSISTANT

## 2024-03-30 RX ORDER — TRAMADOL HYDROCHLORIDE 50 MG/1
50 TABLET ORAL EVERY 12 HOURS PRN
Qty: 12 TABLET | Refills: 0 | Status: ON HOLD | OUTPATIENT
Start: 2024-03-30 | End: 2024-04-12

## 2024-03-30 RX ORDER — ONDANSETRON 4 MG/1
4 TABLET, ORALLY DISINTEGRATING ORAL EVERY 8 HOURS PRN
Qty: 9 TABLET | Refills: 0 | Status: SHIPPED | OUTPATIENT
Start: 2024-03-30 | End: 2024-04-02

## 2024-03-30 RX ORDER — DICYCLOMINE HYDROCHLORIDE 10 MG/ML
20 INJECTION INTRAMUSCULAR
Status: COMPLETED | OUTPATIENT
Start: 2024-03-30 | End: 2024-03-30

## 2024-03-30 RX ORDER — POLYETHYLENE GLYCOL 3350 17 G/17G
17 POWDER, FOR SOLUTION ORAL DAILY
Qty: 7 EACH | Refills: 0 | Status: SHIPPED | OUTPATIENT
Start: 2024-03-30 | End: 2024-04-06

## 2024-03-30 RX ORDER — DOCUSATE SODIUM 100 MG/1
100 CAPSULE, LIQUID FILLED ORAL 2 TIMES DAILY PRN
Qty: 14 CAPSULE | Refills: 0 | Status: SHIPPED | OUTPATIENT
Start: 2024-03-30 | End: 2024-04-06

## 2024-03-30 RX ORDER — KETOROLAC TROMETHAMINE 30 MG/ML
15 INJECTION, SOLUTION INTRAMUSCULAR; INTRAVENOUS
Status: COMPLETED | OUTPATIENT
Start: 2024-03-30 | End: 2024-03-30

## 2024-03-30 RX ORDER — INDOMETHACIN 25 MG/1
25 CAPSULE ORAL 2 TIMES DAILY PRN
Qty: 14 CAPSULE | Refills: 0 | Status: ON HOLD | OUTPATIENT
Start: 2024-03-30 | End: 2024-04-12 | Stop reason: HOSPADM

## 2024-03-30 RX ADMIN — DICYCLOMINE HYDROCHLORIDE 20 MG: 20 INJECTION, SOLUTION INTRAMUSCULAR at 07:03

## 2024-03-30 RX ADMIN — KETOROLAC TROMETHAMINE 15 MG: 30 INJECTION, SOLUTION INTRAMUSCULAR; INTRAVENOUS at 07:03

## 2024-03-30 RX ADMIN — IOHEXOL 100 ML: 350 INJECTION, SOLUTION INTRAVENOUS at 08:03

## 2024-03-30 NOTE — ED PROVIDER NOTES
Encounter Date: 3/30/2024       History     Chief Complaint   Patient presents with    Abdominal Pain     C/o abdominal cramping with belching . States got placed on meds wed but no relief.      68-year-old male with a history of diabetes and hypertension, presents to the emergency department with complaints of generalized abdominal cramping & lots of belching x 3 weeks.  He was was prescribed Protonix with no relief. US abdomen was done on 3/28/24 which showed HRONDA sign suggestive of a gallbladder full of stones however patient has not received a call with results.  He rates his pain 8/10.  He denies fever, nausea, vomiting, chest pain, shortness of breath, diarrhea.      The history is provided by the patient. No  was used.     Review of patient's allergies indicates:  No Known Allergies  Past Medical History:   Diagnosis Date    Chronic low back pain with left-sided sciatica     Diabetes mellitus     HTN (hypertension)      Past Surgical History:   Procedure Laterality Date    HIP SURGERY Left     ORIF HIP FRACTURE Right     >10 years     Family History   Problem Relation Age of Onset    No Known Problems Mother     Diabetes Father      Social History     Tobacco Use    Smoking status: Every Day     Current packs/day: 0.50     Average packs/day: 0.5 packs/day for 52.2 years (26.1 ttl pk-yrs)     Types: Cigarettes     Start date: 1/1/1972    Smokeless tobacco: Never   Substance Use Topics    Alcohol use: Not Currently    Drug use: Never     Review of Systems   Constitutional:  Negative for chills and fever.   Respiratory:  Negative for cough and shortness of breath.    Cardiovascular:  Negative for chest pain and palpitations.   Gastrointestinal:  Positive for abdominal pain. Negative for nausea and vomiting.   Genitourinary:  Negative for dysuria and flank pain.   Musculoskeletal:  Negative for back pain and neck pain.   Skin:  Negative for rash.   Neurological:  Negative for dizziness,  light-headedness and headaches.       Physical Exam     Initial Vitals [03/30/24 1832]   BP Pulse Resp Temp SpO2   (!) 190/99 75 20 97.5 °F (36.4 °C) 100 %      MAP       --         Physical Exam    Nursing note and vitals reviewed.  Constitutional: He appears well-developed and well-nourished.   HENT:   Nose: Nose normal.   Mouth/Throat: Oropharynx is clear and moist.   Eyes: Conjunctivae are normal.   Neck: Neck supple.   Normal range of motion.  Cardiovascular:  Normal rate, regular rhythm, normal heart sounds and intact distal pulses.           Pulmonary/Chest: Breath sounds normal.   Abdominal: Abdomen is soft. Bowel sounds are normal. There is abdominal tenderness (generalized).   Musculoskeletal:         General: Normal range of motion.      Cervical back: Normal range of motion and neck supple.     Neurological: He is alert. GCS score is 15. GCS eye subscore is 4. GCS verbal subscore is 5. GCS motor subscore is 6.   Skin: Skin is warm. Capillary refill takes less than 2 seconds.         ED Course   Procedures  Labs Reviewed   COMPREHENSIVE METABOLIC PANEL - Abnormal; Notable for the following components:       Result Value    Glucose Level 142 (*)     Calcium Level Total 10.5 (*)     Protein Total 8.1 (*)     Globulin 4.1 (*)     Albumin/Globulin Ratio 1.0 (*)     Alanine Aminotransferase 57 (*)     Aspartate Aminotransferase 44 (*)     All other components within normal limits   URINALYSIS, REFLEX TO URINE CULTURE - Abnormal; Notable for the following components:    Protein, UA 2+ (*)     Glucose, UA 3+ (*)     Urobilinogen, UA 1+ (*)     Squamous Epithelial Cells, UA Occ (*)     Mucous, UA Occ (*)     All other components within normal limits   CBC WITH DIFFERENTIAL - Abnormal; Notable for the following components:    Hgb 18.7 (*)     Hct 54.8 (*)     All other components within normal limits   LIPASE - Normal   C-REACTIVE PROTEIN - Normal   CBC W/ AUTO DIFFERENTIAL    Narrative:     The following orders  were created for panel order CBC Auto Differential.  Procedure                               Abnormality         Status                     ---------                               -----------         ------                     CBC with Differential[[id number]]       Abnormal            Final result                 Please view results for these tests on the individual orders.   EXTRA TUBES    Narrative:     The following orders were created for panel order EXTRA TUBES.  Procedure                               Abnormality         Status                     ---------                               -----------         ------                     Light Blue Top Hold[[id number]]                             Final result               Red Top Hold[[id number]]                                    Final result               Lavender Top Hold[[id number]]                               Final result               Pink Top Hold[[id number]]                                   Final result                 Please view results for these tests on the individual orders.   LIGHT BLUE TOP HOLD   RED TOP HOLD   LAVENDER TOP HOLD   PINK TOP HOLD          Imaging Results              CT Abdomen Pelvis With IV Contrast NO Oral Contrast (Final result)  Result time 03/30/24 20:57:53      Final result by Pedro Garcia MD (03/30/24 20:57:53)                   Impression:      1. Colonic fecal loading throughout is consistent with constipation.  No bowel obstruction.  No pericolonic acute strandings.  Please correlate patient is up-to-date on colonoscopy.    2. Multiple gallstones and slightly thickened gallbladder wall without pericholecystic fluid or dilatation of ducts.    3. Hepatic steatosis.      Electronically signed by: Pedro Garcia  Date:    03/30/2024  Time:    20:57               Narrative:    EXAMINATION:  CT ABDOMEN PELVIS WITH IV CONTRAST    CLINICAL HISTORY:  Abdominal pain, acute, nonlocalized;Nausea/vomiting;Gallstones seen on  US;    TECHNIQUE:  Multidetector axial images were obtained of the abdomen and pelvis following the administration of IV contrast. Oral contrast was not administered.    Dose length product of 124 mGycm. Automated exposure control was utilized to minimize radiation dose.    COMPARISON:  Ultrasound abdomen March 27, 2024    FINDINGS:  Included portion of the lungs are without suspicious nodularity, acute air space infiltrates or fluid within the pleural spaces.    Liver is remarkable for steatosis without focal space occupying lesion.  Gallbladder lumen is remarkable for numerous calculi and there is slightly thickened gallbladder wall.  No apparent pericholecystic fluid.  There is also no significant dilatation of the intrahepatic or the extrahepatic ducts. Pancreatic unremarkable attenuation without acute peripancreatic phlegmons. Main pancreatic duct is not dilated. Spleen is of normal size without focal lesion.    The adrenal glands appear within normal limits. The kidneys are unremarkable in size and contour. No solid or cystic renal lesion identified. There is no hydronephrosis. No perinephric fluid strandings or collections identified.  There is calcified plaque and mural thrombus involving the abdominal aorta and the iliac arteries with maximum diameter of 2.0 cm.    Stomach is mostly decompressed.  No abnormal dilatation of loops of small bowel and there is no focal or generalized mural thickening.  Appendix is nondilated and partially air-filled on image 36 series 6.  There is colonic fecal loading throughout consistent with constipation.  There is limited assessment of the colon.  There are no acute pericolonic strandings or evidence of bowel obstruction.    Urinary bladder wall is not thickened.  There is no pelvic free fluid.    Left hip appear ears ORIF.  No acute or otherwise osseous abnormality identified.                                       Medications   ketorolac injection 15 mg (15 mg Intravenous  Given 3/30/24 1937)   dicyclomine injection 20 mg (20 mg Intramuscular Given 3/30/24 1937)   iohexoL (OMNIPAQUE 350) injection 100 mL (100 mLs Intravenous Given 3/30/24 2046)     Medical Decision Making  68-year-old male with a history of diabetes and hypertension, presents to the emergency department with complaints of generalized abdominal cramping & lots of belching x 3 weeks.  He was was prescribed Protonix with no relief. US abdomen was done on 3/28/24 which showed RHONDA sign suggestive of a gallbladder full of stones however patient has not received a call with results.  He rates his pain 8/10.  He denies fever, nausea, vomiting, chest pain, shortness of breath, diarrhea.      DDx:  Colace lithiasis, cholecystitis, gastritis, GERD, pancreatitis    ALT 57, AST 44, no leukocytosis, CT abdomen and pelvis with IV contrast ordered for further evaluation.    Amount and/or Complexity of Data Reviewed  Labs: ordered. Decision-making details documented in ED Course.  Radiology: ordered.    Risk  Prescription drug management.               ED Course as of 03/30/24 2118   Sat Mar 30, 2024   1923 WBC: 6.87 [ER]   1929 ALT(!): 57 [ER]   1929 AST(!): 44 [ER]   2020 I have assumed care of the pt at this time. I agree with initial physical assessment. Pt currently awaiting the results of his CT scan. Pt denies needs at this time. [JA]   2050 I transitioned this patient to ASHLEY Rios at this time, pending CT results.   [ER]   2107 Given strict ED return precautions. I have spoken with the patient and/or caregivers. I have explained the patient's condition, diagnoses and treatment plan based on the information available to me at this time. I have answered the patient's and/or caregiver's questions and addressed any concerns. The patient and/or caregivers have as good an understanding of the patient's diagnosis, condition and treatment plan as can be expected at this point. The vital signs have been stable. The patient's condition  is stable and appropriate for discharge from the emergency department.      The patient will pursue further outpatient evaluation with the primary care physician or other designated or consulting physician as outlined in the discharge instructions. The patient and/or caregivers are agreeable to this plan of care and follow-up instructions have been explained in detail. The patient and/or caregivers have received these instructions in written format and have expressed an understanding of the discharge instructions. The patient and/or caregivers are aware that any significant change in condition or worsening of symptoms should prompt an immediate return to this or the closest emergency department or a call to 911.   [JA]      ED Course User Index  [ER] Lakesha Bello PA  [JA] Justin Rios Jr., FNP                             Clinical Impression:  Final diagnoses:  [K80.20] Calculus of gallbladder without cholecystitis without obstruction (Primary)  [K59.00] Constipation, unspecified constipation type          ED Disposition Condition    Discharge Stable          ED Prescriptions       Medication Sig Dispense Start Date End Date Auth. Provider    indomethacin (INDOCIN) 25 MG capsule Take 1 capsule (25 mg total) by mouth 2 (two) times daily as needed (pain). 14 capsule 3/30/2024 -- Justin Rios Jr., FNP    ondansetron (ZOFRAN-ODT) 4 MG TbDL Take 1 tablet (4 mg total) by mouth every 8 (eight) hours as needed (Nausea). 9 tablet 3/30/2024 4/2/2024 Justin Rios Jr., FNP    docusate sodium (COLACE) 100 MG capsule Take 1 capsule (100 mg total) by mouth 2 (two) times daily as needed for Constipation. 14 capsule 3/30/2024 4/6/2024 Justin Rios Jr., FNP    polyethylene glycol (GLYCOLAX) 17 gram PwPk Take 17 g by mouth once daily. for 7 days 7 each 3/30/2024 4/6/2024 Justin Rios Jr., FNP    traMADoL (ULTRAM) 50 mg tablet Take 1 tablet (50 mg total) by mouth every 12 (twelve) hours as needed for Pain. 12  tablet 3/30/2024 -- Justin Rios Jr., [facility]          Follow-up Information    None          Justin Rios Jr., [facility]  03/30/24 [id number]

## 2024-03-31 NOTE — DISCHARGE INSTRUCTIONS
Follow up with your primary care physician in 3-5 days for follow up evaluation.  Increase oral fluids.Increase fluid intake, clear liquids x 12 hours. Advance diet slowly.   Follow up with [facility] for evaluation.  Avoid greasy, spicy foods.  Return to the [facility] ED for worsening pain, chest pain, or bleeding in emesis or stool.  Take pain medication as prescribed for no more than 7 days.

## 2024-04-08 ENCOUNTER — HOSPITAL ENCOUNTER (INPATIENT)
Facility: HOSPITAL | Age: 69
LOS: 4 days | Discharge: HOME | DRG: 444 | End: 2024-04-12
Attending: STUDENT IN AN ORGANIZED HEALTH CARE EDUCATION/TRAINING PROGRAM | Admitting: INTERNAL MEDICINE
Payer: MEDICARE

## 2024-04-08 DIAGNOSIS — R63.4 WEIGHT LOSS: ICD-10-CM

## 2024-04-08 DIAGNOSIS — R07.9 CHEST PAIN: ICD-10-CM

## 2024-04-08 DIAGNOSIS — K80.20 CALCULUS OF GALLBLADDER WITHOUT CHOLECYSTITIS WITHOUT OBSTRUCTION: Primary | ICD-10-CM

## 2024-04-08 DIAGNOSIS — B19.20 HEPATITIS C VIRUS INFECTION WITHOUT HEPATIC COMA, UNSPECIFIED CHRONICITY: ICD-10-CM

## 2024-04-08 DIAGNOSIS — K80.00 ACUTE CALCULOUS CHOLECYSTITIS: ICD-10-CM

## 2024-04-08 DIAGNOSIS — K29.70 GASTRITIS, PRESENCE OF BLEEDING UNSPECIFIED, UNSPECIFIED CHRONICITY, UNSPECIFIED GASTRITIS TYPE: ICD-10-CM

## 2024-04-08 PROBLEM — K59.00 CONSTIPATION: Status: ACTIVE | Noted: 2024-04-08

## 2024-04-08 LAB
ALBUMIN SERPL-MCNC: 3.9 G/DL (ref 3.4–4.8)
ALBUMIN/GLOB SERPL: 1 RATIO (ref 1.1–2)
ALP SERPL-CCNC: 98 UNIT/L (ref 40–150)
ALT SERPL-CCNC: 89 UNIT/L (ref 0–55)
AMORPH URATE CRY URNS QL MICRO: ABNORMAL /UL
APPEARANCE UR: ABNORMAL
AST SERPL-CCNC: 100 UNIT/L (ref 5–34)
BACTERIA #/AREA URNS AUTO: ABNORMAL /HPF
BASOPHILS # BLD AUTO: 0.04 X10(3)/MCL
BASOPHILS NFR BLD AUTO: 0.5 %
BILIRUB SERPL-MCNC: 0.7 MG/DL
BILIRUB UR QL STRIP.AUTO: NEGATIVE
BUN SERPL-MCNC: 19.4 MG/DL (ref 8.4–25.7)
CALCIUM SERPL-MCNC: 9.8 MG/DL (ref 8.8–10)
CHLORIDE SERPL-SCNC: 104 MMOL/L (ref 98–107)
CO2 SERPL-SCNC: 26 MMOL/L (ref 23–31)
COLOR UR AUTO: YELLOW
CREAT SERPL-MCNC: 0.94 MG/DL (ref 0.73–1.18)
EOSINOPHIL # BLD AUTO: 0.02 X10(3)/MCL (ref 0–0.9)
EOSINOPHIL NFR BLD AUTO: 0.3 %
ERYTHROCYTE [DISTWIDTH] IN BLOOD BY AUTOMATED COUNT: 11.6 % (ref 11.5–17)
GFR SERPLBLD CREATININE-BSD FMLA CKD-EPI: >60 MLS/MIN/1.73/M2
GLOBULIN SER-MCNC: 3.9 GM/DL (ref 2.4–3.5)
GLUCOSE SERPL-MCNC: 224 MG/DL (ref 82–115)
GLUCOSE UR QL STRIP.AUTO: ABNORMAL
HCT VFR BLD AUTO: 56.7 % (ref 42–52)
HGB BLD-MCNC: 19.7 G/DL (ref 14–18)
IMM GRANULOCYTES # BLD AUTO: 0.02 X10(3)/MCL (ref 0–0.04)
IMM GRANULOCYTES NFR BLD AUTO: 0.3 %
KETONES UR QL STRIP.AUTO: NEGATIVE
LEUKOCYTE ESTERASE UR QL STRIP.AUTO: 25
LIPASE SERPL-CCNC: 16 U/L
LYMPHOCYTES # BLD AUTO: 3.22 X10(3)/MCL (ref 0.6–4.6)
LYMPHOCYTES NFR BLD AUTO: 41.9 %
MAGNESIUM SERPL-MCNC: 2 MG/DL (ref 1.6–2.6)
MCH RBC QN AUTO: 30.5 PG (ref 27–31)
MCHC RBC AUTO-ENTMCNC: 34.7 G/DL (ref 33–36)
MCV RBC AUTO: 87.9 FL (ref 80–94)
MONOCYTES # BLD AUTO: 0.54 X10(3)/MCL (ref 0.1–1.3)
MONOCYTES NFR BLD AUTO: 7 %
MUCOUS THREADS URNS QL MICRO: ABNORMAL /LPF
NEUTROPHILS # BLD AUTO: 3.85 X10(3)/MCL (ref 2.1–9.2)
NEUTROPHILS NFR BLD AUTO: 50 %
NITRITE UR QL STRIP.AUTO: NEGATIVE
NRBC BLD AUTO-RTO: 0 %
PH UR STRIP.AUTO: 8 [PH]
PLATELET # BLD AUTO: 250 X10(3)/MCL (ref 130–400)
PMV BLD AUTO: 10.5 FL (ref 7.4–10.4)
POCT GLUCOSE: 160 MG/DL (ref 70–110)
POTASSIUM SERPL-SCNC: 4.3 MMOL/L (ref 3.5–5.1)
PROT SERPL-MCNC: 7.8 GM/DL (ref 5.8–7.6)
PROT UR QL STRIP.AUTO: ABNORMAL
RBC # BLD AUTO: 6.45 X10(6)/MCL (ref 4.7–6.1)
RBC #/AREA URNS AUTO: ABNORMAL /HPF
RBC UR QL AUTO: NEGATIVE
SODIUM SERPL-SCNC: 139 MMOL/L (ref 136–145)
SP GR UR STRIP.AUTO: 1.02 (ref 1–1.03)
SQUAMOUS #/AREA URNS LPF: ABNORMAL /HPF
TRI-PHOS CRY URNS QL MICRO: ABNORMAL /HPF
UROBILINOGEN UR STRIP-ACNC: NORMAL
WBC # SPEC AUTO: 7.69 X10(3)/MCL (ref 4.5–11.5)
WBC #/AREA URNS AUTO: ABNORMAL /HPF

## 2024-04-08 PROCEDURE — 83690 ASSAY OF LIPASE: CPT | Performed by: NURSE PRACTITIONER

## 2024-04-08 PROCEDURE — 99285 EMERGENCY DEPT VISIT HI MDM: CPT | Mod: 25

## 2024-04-08 PROCEDURE — 80053 COMPREHEN METABOLIC PANEL: CPT | Performed by: NURSE PRACTITIONER

## 2024-04-08 PROCEDURE — 11000001 HC ACUTE MED/SURG PRIVATE ROOM

## 2024-04-08 PROCEDURE — 85025 COMPLETE CBC W/AUTO DIFF WBC: CPT | Performed by: NURSE PRACTITIONER

## 2024-04-08 PROCEDURE — 81001 URINALYSIS AUTO W/SCOPE: CPT | Performed by: NURSE PRACTITIONER

## 2024-04-08 PROCEDURE — 25500020 PHARM REV CODE 255: Performed by: INTERNAL MEDICINE

## 2024-04-08 PROCEDURE — 63600175 PHARM REV CODE 636 W HCPCS: Performed by: INTERNAL MEDICINE

## 2024-04-08 PROCEDURE — 82962 GLUCOSE BLOOD TEST: CPT

## 2024-04-08 PROCEDURE — 83735 ASSAY OF MAGNESIUM: CPT | Performed by: NURSE PRACTITIONER

## 2024-04-08 RX ORDER — POLYETHYLENE GLYCOL 3350 17 G/17G
17 POWDER, FOR SOLUTION ORAL 2 TIMES DAILY PRN
Status: DISCONTINUED | OUTPATIENT
Start: 2024-04-08 | End: 2024-04-12 | Stop reason: HOSPADM

## 2024-04-08 RX ORDER — ENOXAPARIN SODIUM 100 MG/ML
40 INJECTION SUBCUTANEOUS EVERY 24 HOURS
Status: DISCONTINUED | OUTPATIENT
Start: 2024-04-09 | End: 2024-04-12 | Stop reason: HOSPADM

## 2024-04-08 RX ORDER — INSULIN ASPART 100 [IU]/ML
1-10 INJECTION, SOLUTION INTRAVENOUS; SUBCUTANEOUS EVERY 6 HOURS PRN
Status: DISCONTINUED | OUTPATIENT
Start: 2024-04-08 | End: 2024-04-12 | Stop reason: HOSPADM

## 2024-04-08 RX ORDER — GLUCAGON 1 MG
1 KIT INJECTION
Status: DISCONTINUED | OUTPATIENT
Start: 2024-04-08 | End: 2024-04-12 | Stop reason: HOSPADM

## 2024-04-08 RX ORDER — ACETAMINOPHEN 325 MG/1
650 TABLET ORAL EVERY 4 HOURS PRN
Status: DISCONTINUED | OUTPATIENT
Start: 2024-04-08 | End: 2024-04-12 | Stop reason: HOSPADM

## 2024-04-08 RX ORDER — ALUMINUM HYDROXIDE, MAGNESIUM HYDROXIDE, AND SIMETHICONE 1200; 120; 1200 MG/30ML; MG/30ML; MG/30ML
30 SUSPENSION ORAL 4 TIMES DAILY PRN
Status: DISCONTINUED | OUTPATIENT
Start: 2024-04-08 | End: 2024-04-12 | Stop reason: HOSPADM

## 2024-04-08 RX ORDER — PROCHLORPERAZINE EDISYLATE 5 MG/ML
5 INJECTION INTRAMUSCULAR; INTRAVENOUS EVERY 6 HOURS PRN
Status: DISCONTINUED | OUTPATIENT
Start: 2024-04-08 | End: 2024-04-12 | Stop reason: HOSPADM

## 2024-04-08 RX ORDER — ONDANSETRON HYDROCHLORIDE 2 MG/ML
4 INJECTION, SOLUTION INTRAVENOUS EVERY 4 HOURS PRN
Status: DISCONTINUED | OUTPATIENT
Start: 2024-04-08 | End: 2024-04-12 | Stop reason: HOSPADM

## 2024-04-08 RX ORDER — SODIUM CHLORIDE, SODIUM LACTATE, POTASSIUM CHLORIDE, CALCIUM CHLORIDE 600; 310; 30; 20 MG/100ML; MG/100ML; MG/100ML; MG/100ML
INJECTION, SOLUTION INTRAVENOUS CONTINUOUS
Status: DISCONTINUED | OUTPATIENT
Start: 2024-04-08 | End: 2024-04-12 | Stop reason: HOSPADM

## 2024-04-08 RX ORDER — TALC
6 POWDER (GRAM) TOPICAL NIGHTLY PRN
Status: DISCONTINUED | OUTPATIENT
Start: 2024-04-08 | End: 2024-04-12 | Stop reason: HOSPADM

## 2024-04-08 RX ORDER — AMOXICILLIN 250 MG
2 CAPSULE ORAL 2 TIMES DAILY PRN
Status: DISCONTINUED | OUTPATIENT
Start: 2024-04-08 | End: 2024-04-12 | Stop reason: HOSPADM

## 2024-04-08 RX ORDER — SODIUM CHLORIDE 0.9 % (FLUSH) 0.9 %
10 SYRINGE (ML) INJECTION
Status: DISCONTINUED | OUTPATIENT
Start: 2024-04-08 | End: 2024-04-12 | Stop reason: HOSPADM

## 2024-04-08 RX ADMIN — INSULIN ASPART 2 UNITS: 100 INJECTION, SOLUTION INTRAVENOUS; SUBCUTANEOUS at 10:04

## 2024-04-08 RX ADMIN — IOHEXOL 100 ML: 350 INJECTION, SOLUTION INTRAVENOUS at 09:04

## 2024-04-08 RX ADMIN — SODIUM CHLORIDE, POTASSIUM CHLORIDE, SODIUM LACTATE AND CALCIUM CHLORIDE: 600; 310; 30; 20 INJECTION, SOLUTION INTRAVENOUS at 09:04

## 2024-04-08 NOTE — Clinical Note
Diagnosis: Calculus of gallbladder without cholecystitis without obstruction [[id number]]   Future Attending Provider: BOBBI FELDMAN [[id number]]   Admit to which facility:: [facility] [[id number]]   Reason for IP Medical Treatment  (Clinical interventions that can only be accomplished in the IP setting? ) :: Cholelithiasis, needs HIDA   I certify that Inpatient services for greater than or equal to 2 midnights are medically necessary:: Yes   Plans for Post-Acute care--if anticipated (pick the single best option):: A. No post acute care anticipated at this time

## 2024-04-08 NOTE — ED PROVIDER NOTES
Encounter Date: 4/8/2024       History     Chief Complaint   Patient presents with    Abdominal Pain     Right upper quadrant pain x 1 month. US from [facility] and was told of gallstone. Reports pain worsening, denies N/V.     The patient is a 68 y.o. male with history of hypertension and diabetes who presents to the Emergency Department with a chief complaint of RUQ abdominal pain. Patient reports pain worse after eating and drinking. He reports he was seen a few weeks ago at another ER and told that he had gallstones. Symptoms began 3 weeks ago and have been intermittent since onset. His pain is currently rated as a 6/10 in severity and described as aching with no radiation. Associated symptoms include nausea and diarrhea. Symptoms are aggravated with eating/drinking and there are no alleviating factors. The patient denies vomiting, fever, or chills. She reports taking nothing prior to arrival with no relief of symptoms. No other reported symptoms at this time.      The history is provided by the patient. No  was used.   Abdominal Pain  The current episode started several weeks ago. The onset of the illness was gradual. The problem has not changed since onset.The abdominal pain is located in the RUQ. The abdominal pain does not radiate. The severity of the abdominal pain is 6/10. The abdominal pain is relieved by nothing. The other symptoms of the illness include nausea and diarrhea. The other symptoms of the illness do not include fever, shortness of breath, vomiting or dysuria.   The diarrhea began more than 1 week ago. The diarrhea occurs 2 - 4 times per day.   Nausea began more than 1 week ago. The nausea is associated with eating. The nausea is exacerbated by food.   Symptoms associated with the illness do not include chills, heartburn, urgency, frequency or back pain. Significant associated medical issues include gallstones.     Review of patient's allergies indicates:  No Known Allergies  Past  Medical History:   Diagnosis Date    Chronic low back pain with left-sided sciatica     Diabetes mellitus     HTN (hypertension)      Past Surgical History:   Procedure Laterality Date    HIP SURGERY Left     ORIF HIP FRACTURE Right     >10 years     Family History   Problem Relation Age of Onset    No Known Problems Mother     Diabetes Father      Social History     Tobacco Use    Smoking status: Every Day     Current packs/day: 0.50     Average packs/day: 0.5 packs/day for 52.3 years (26.1 ttl pk-yrs)     Types: Cigarettes     Start date: 1/1/1972    Smokeless tobacco: Never   Substance Use Topics    Alcohol use: Not Currently    Drug use: Never     Review of Systems   Constitutional:  Negative for chills and fever.   HENT:  Negative for sore throat.    Respiratory:  Negative for chest tightness and shortness of breath.    Cardiovascular:  Negative for chest pain.   Gastrointestinal:  Positive for abdominal pain, diarrhea and nausea. Negative for heartburn and vomiting.   Genitourinary:  Negative for dysuria, frequency and urgency.   Musculoskeletal:  Negative for back pain.   Skin:  Negative for rash.   Neurological:  Negative for weakness.   Hematological:  Does not bruise/bleed easily.       Physical Exam     Initial Vitals [04/08/24 1332]   BP Pulse Resp Temp SpO2   (!) 181/98 92 18 98.3 °F (36.8 °C) 97 %      MAP       --         Physical Exam    Nursing note and vitals reviewed.  Constitutional: Vital signs are normal. He appears well-developed and well-nourished.   HENT:   Head: Normocephalic.   Right Ear: Hearing and tympanic membrane normal.   Left Ear: Hearing and tympanic membrane normal.   Mouth/Throat: Uvula is midline, oropharynx is clear and moist and mucous membranes are normal.   Eyes: Conjunctivae and EOM are normal. Pupils are equal, round, and reactive to light.   Cardiovascular:  Normal rate, regular rhythm, normal heart sounds and normal pulses.           Pulmonary/Chest: Effort normal and  breath sounds normal.   Abdominal: Abdomen is soft. Bowel sounds are normal. There is abdominal tenderness in the right upper quadrant.   Musculoskeletal:      Cervical back: No spinous process tenderness or muscular tenderness.     Lymphadenopathy:     He has no cervical adenopathy.   Neurological: He is alert. GCS eye subscore is 4. GCS verbal subscore is 5. GCS motor subscore is 6.   Skin: Skin is warm, dry and intact. Capillary refill takes less than 2 seconds.         ED Course   Procedures  Labs Reviewed   CBC WITH DIFFERENTIAL - Abnormal; Notable for the following components:       Result Value    RBC 6.45 (*)     Hgb 19.7 (*)     Hct 56.7 (*)     MPV 10.5 (*)     All other components within normal limits   COMPREHENSIVE METABOLIC PANEL - Abnormal; Notable for the following components:    Glucose Level 224 (*)     Protein Total 7.8 (*)     Globulin 3.9 (*)     Albumin/Globulin Ratio 1.0 (*)     Alanine Aminotransferase 89 (*)     Aspartate Aminotransferase 100 (*)     All other components within normal limits   LIPASE - Normal   MAGNESIUM - Normal   URINALYSIS, REFLEX TO URINE CULTURE          Imaging Results              US Abdomen Limited (Final result)  Result time 04/08/24 16:23:06      Final result by Ottoniel Michel MD (04/08/24 16:23:06)                   Impression:      Cholelithiasis with positive sonographic Jurado sign, but no gallbladder wall thickening or clear ascites.  Appearance is equivocal for early cholecystitis.  No biliary dilatation.      Electronically signed by: Ottoniel Michel  Date:    04/08/2024  Time:    16:23               Narrative:    EXAMINATION:  US ABDOMEN LIMITED    CLINICAL HISTORY:  RUQ pain;    TECHNIQUE:  Gray-scale and color Doppler ultrasound images of the abdomen.    COMPARISON:  Ultrasound 03/27/2024    FINDINGS:  Normal pancreas.  Normal caliber of the superior IVC.    Liver has normal size and echogenicity.  Main portal vein patent with normal flow direction.   Normal CBD caliber.  Multiple shadowing gallstones.  No gallbladder wall thickening or significant ascites.  Positive sonographic Jurado sign.    No hydronephrosis or defined calcification in the right kidney.    Measurements:    - Liver: 15.6 cm    - CBD diameter: 3 mm    - Right kidney: 10.6 cm in length                                       Medications - No data to display  Medical Decision Making  The patient is a 68 y.o. male who presents to the Emergency Department with a chief complaint of RUQ abdominal pain. Patient reports pain worse after eating and drinking. He reports he was seen a few weeks ago at another ER and told that he had gallstones. Symptoms began 3 weeks ago and have been intermittent since onset. His pain is currently rated as a 6/10 in severity and described as aching with no radiation. Associated symptoms include nausea and diarrhea. Symptoms are aggravated with eating/drinking and there are no alleviating factors. The patient denies vomiting, fever, or chills. She reports taking nothing prior to arrival with no relief of symptoms. No other reported symptoms at this time.      Judging by the patient's chief complaint and pertinent history, the patient has the following possible differential diagnoses, including but not limited to the following.  Some of these are deemed to be lower likelihood and some more likely based on my physical exam and history combined with possible lab work and/or imaging studies.   Please see the pertinent studies, and refer to the HPI.  Some of these diagnoses will take further evaluation to fully rule out, perhaps as an outpatient and the patient was encouraged to follow up when discharged for more comprehensive evaluation.    appendicitis, biliary disease, diverticulitis, cholelithiasis, cholecystitis,  mesenteric ischemia, intraabdominal abcess, retroperitoneal abcess, gastritis, gastroenteritis, hepatitis, hernia, pancreatitis, inflammatory bowel disease, PUD,  SBP, nephrolithiasis, consitpation, GERD, IBS     Problems Addressed:  Calculus of gallbladder without cholecystitis without obstruction: acute illness or injury    Amount and/or Complexity of Data Reviewed  Radiology: ordered. Decision-making details documented in ED Course.  Discussion of management or test interpretation with external provider(s): Discussed with ED attending, , he had face to face encounter with patient.     Discussed with Dr. Steele with general surgery. Recommends HIDA scan and admit to hospital medicine. Surgery will follow.     Risk  Decision regarding hospitalization.               ED Course as of 04/08/24 1813 Mon Apr 08, 2024   1642 US Abdomen Limited  General surgery paged [LM]   1708 General surgery at bedside [LM]   1725 Discussed with Dr. Steele with general surgery. Recommends admission to medicine for HIDA scan. Surgery to follow. [LM]   1736 Hospital medicine paged for admission.  [LM]   1807 Discussed with LILI Eng with hospital medicine. Accepts admission for Dr. Aleman  [LM]      ED Course User Index  [LM] Stephanie Early NP                           Clinical Impression:  Final diagnoses:  [K80.20] Calculus of gallbladder without cholecystitis without obstruction (Primary)          ED Disposition Condition    Admit Stable                Stephanie Early NP  04/08/24 1813       Stephanie Early NP  04/08/24 1813

## 2024-04-08 NOTE — CONSULTS
Acute Care Surgery   Consultation    Patient Name: Heath Holder  YOB: 1955  Date: 04/08/2024 5:20 PM  Date of Admission: 4/8/2024  HD#0  POD#* No surgery found *    PRESENTING HISTORY   Chief Complaint/Reason for Admission: <principal problem not specified>    History of Present Illness:  Patient is 68 y M w/ history of HTN, DM, chronic tobacco abuse presenting with complaint of months long chronic abdominal pain that has been persistent and worsening. He states the pain has been present since before December, associated with watery diarrhea with every meal, unintentional weight loss. When asked to identify the location of pain, the patient indicates the mid-lower and RLQ of abdomen.   He was evaluated at [facility] ED 1 week ago for a similar complaint and instructed to follow up with general surgery for outpatient evaluation for elective lap josiah.   He was evaluated by PCP 4 weeks ago for complaint of gastric bloating with eating.     Review of Systems:  12 point ROS negative except as stated in HPI    PAST HISTORY:   Past medical history:  Past Medical History:   Diagnosis Date    Chronic low back pain with left-sided sciatica     Diabetes mellitus     HTN (hypertension)        Past surgical history:  Past Surgical History:   Procedure Laterality Date    HIP SURGERY Left     ORIF HIP FRACTURE Right     >10 years       Family history:  Family History   Problem Relation Age of Onset    No Known Problems Mother     Diabetes Father        Social history:  Social History     Socioeconomic History    Marital status:    Tobacco Use    Smoking status: Every Day     Current packs/day: 0.50     Average packs/day: 0.5 packs/day for 52.3 years (26.1 ttl pk-yrs)     Types: Cigarettes     Start date: 1/1/1972    Smokeless tobacco: Never   Substance and Sexual Activity    Alcohol use: Not Currently    Drug use: Never     Social Determinants of Health     Financial Resource Strain: Low Risk  (2/15/2024)     Overall Financial Resource Strain (CARDIA)     Difficulty of Paying Living Expenses: Not hard at all   Food Insecurity: No Food Insecurity (2/15/2024)    Hunger Vital Sign     Worried About Running Out of Food in the Last Year: Never true     Ran Out of Food in the Last Year: Never true   Transportation Needs: No Transportation Needs (2/15/2024)    PRAPARE - Transportation     Lack of Transportation (Medical): No     Lack of Transportation (Non-Medical): No   Physical Activity: Sufficiently Active (2/15/2024)    Exercise Vital Sign     Days of Exercise per Week: 5 days     Minutes of Exercise per Session: 50 min   Stress: No Stress Concern Present (2/15/2024)    [employer] - Occupational Stress Questionnaire     Feeling of Stress : Not at all   Social Connections: Moderately Isolated (2/15/2024)    Social Connection and Isolation Panel [NHANES]     Frequency of Communication with Friends and Family: More than three times a week     Frequency of Social Gatherings with Friends and Family: More than three times a week     Attends Buddhism Services: Never     Active Member of Clubs or Organizations: No     Attends Club or Organization Meetings: Never     Marital Status:    Housing Stability: Low Risk  (2/15/2024)    Housing Stability Vital Sign     Unable to Pay for Housing in the Last Year: No     Number of Places Lived in the Last Year: 1     Unstable Housing in the Last Year: No     Social History     Tobacco Use   Smoking Status Every Day    Current packs/day: 0.50    Average packs/day: 0.5 packs/day for 52.3 years (26.1 ttl pk-yrs)    Types: Cigarettes    Start date: 1/1/1972   Smokeless Tobacco Never      Social History     Substance and Sexual Activity   Alcohol Use Not Currently        MEDICATIONS & ALLERGIES:     No current facility-administered medications on file prior to encounter.     Current Outpatient Medications on File Prior to Encounter   Medication Sig     "indomethacin (INDOCIN) 25 MG capsule Take 1 capsule (25 mg total) by mouth 2 (two) times daily as needed (pain).    lisinopriL (PRINIVIL,ZESTRIL) 40 MG tablet Take 1 tablet (40 mg total) by mouth once daily.    metFORMIN (GLUCOPHAGE) 1000 MG tablet Take 1 tablet (1,000 mg total) by mouth 2 (two) times daily with meals.    pantoprazole (PROTONIX) 20 MG tablet Take 1 tablet (20 mg total) by mouth once daily.    traMADoL (ULTRAM) 50 mg tablet Take 1 tablet (50 mg total) by mouth every 12 (twelve) hours as needed for Pain.       Allergies: Review of patient's allergies indicates:  No Known Allergies      OBJECTIVE:   Vital Signs:  VITAL SIGNS: 24 HR MIN & MAX LAST   Temp  Min: 98.3 °F (36.8 °C)  Max: 98.3 °F (36.8 °C)  98.3 °F (36.8 °C)   BP  Min: 155/99  Max: 181/98  (!) 155/99    Pulse  Min: 78  Max: 92  81    Resp  Min: 18  Max: 18  18    SpO2  Min: 97 %  Max: 99 %  99 %      HT:    WT: 52.2 kg (115 lb)  BMI:       Intake/output:  Intake/Output - Last 3 Shifts       None          No intake or output data in the 24 hours ending 04/08/24 1720      Physical Exam:  General: Thin male, no acute distress  HEENT: Normocephalic, atraumatic, PERRL  CV: RR  Resp: NWOB  GI:  Abdomen soft, tenderness to lower quadrants and RUQ, maximal tenderness to RLQ  :  Deferred  MSK: moving all extremities spontaneously  Skin/wounds:  No rashes, ulcers, erythema  Neuro:  CNII-XII grossly intact, alert and oriented to person, place, and time    Labs:  Troponin:  No results for input(s): "TROPONINI" in the last 72 hours.  CBC:  Recent Labs     04/08/24  1448   WBC 7.69   RBC 6.45*   HGB 19.7*   HCT 56.7*      MCV 87.9   MCH 30.5   MCHC 34.7     CMP:  Recent Labs     04/08/24  1448   CALCIUM 9.8   ALBUMIN 3.9      K 4.3   CO2 26   BUN 19.4   CREATININE 0.94   ALKPHOS 98   ALT 89*   *   BILITOT 0.7     Lactic Acid:  No results for input(s): "LACTATE" in the last 72 hours.  ETOH:  No results for input(s): "ETHANOL" in the " "last 72 hours.   Urine Drug Screen:  No results for input(s): "COCAINE", "OPIATE", "BARBITURATE", "AMPHETAMINE", "FENTANYL", "CANNABINOIDS", "MDMA" in the last 72 hours.    Invalid input(s): "BENZODIAZEPINE", "PHENCYCLIDINE"   ABG:  No results for input(s): "PH", "PO2", "PCO2", "HCO3", "BE" in the last 168 hours.     Diagnostic Results:  US Abdomen Limited   Final Result      Cholelithiasis with positive sonographic Jurado sign, but no gallbladder wall thickening or clear ascites.  Appearance is equivocal for early cholecystitis.  No biliary dilatation.         Electronically signed by: Ottoniel Michel   Date:    04/08/2024   Time:    16:23          ASSESSMENT & PLAN:    68 y M w/ history of tobacco abuse, poorly controlled DM, HTN, Hepatosteatosis, and cholelithiasis presenting w chronic abdominal pain that has occurred for several months, more recently associated with watery diarrhea with each meal and significant unintentional weight loss. Lab workup reveals normal WBC, T bili, ALP,  mild LFT elevation above baseline. US equivocal for early cholecystitis with no wall thickening or pericholecystic fluid.     - Recommend admission to hospital medicine for further workup  - HIDA scan  - NPO midnight  - Further plan pending HIDA results     Elio Guthrie MD  "

## 2024-04-09 ENCOUNTER — ANESTHESIA EVENT (OUTPATIENT)
Dept: ENDOSCOPY | Facility: HOSPITAL | Age: 69
DRG: 444 | End: 2024-04-09
Payer: MEDICARE

## 2024-04-09 ENCOUNTER — ANESTHESIA (OUTPATIENT)
Dept: ENDOSCOPY | Facility: HOSPITAL | Age: 69
DRG: 444 | End: 2024-04-09
Payer: MEDICARE

## 2024-04-09 LAB
ALBUMIN SERPL-MCNC: 3.6 G/DL (ref 3.4–4.8)
ALBUMIN/GLOB SERPL: 1.1 RATIO (ref 1.1–2)
ALP SERPL-CCNC: 84 UNIT/L (ref 40–150)
ALT SERPL-CCNC: 80 UNIT/L (ref 0–55)
AST SERPL-CCNC: 62 UNIT/L (ref 5–34)
BASOPHILS # BLD AUTO: 0.05 X10(3)/MCL
BASOPHILS NFR BLD AUTO: 0.7 %
BILIRUB SERPL-MCNC: 1 MG/DL
BILIRUBIN DIRECT+TOT PNL SERPL-MCNC: 0.3 MG/DL (ref 0–?)
BUN SERPL-MCNC: 16.8 MG/DL (ref 8.4–25.7)
CALCIUM SERPL-MCNC: 9.7 MG/DL (ref 8.8–10)
CHLORIDE SERPL-SCNC: 106 MMOL/L (ref 98–107)
CO2 SERPL-SCNC: 23 MMOL/L (ref 23–31)
CREAT SERPL-MCNC: 0.84 MG/DL (ref 0.73–1.18)
EOSINOPHIL # BLD AUTO: 0.03 X10(3)/MCL (ref 0–0.9)
EOSINOPHIL NFR BLD AUTO: 0.4 %
ERYTHROCYTE [DISTWIDTH] IN BLOOD BY AUTOMATED COUNT: 11.5 % (ref 11.5–17)
GFR SERPLBLD CREATININE-BSD FMLA CKD-EPI: >60 MLS/MIN/1.73/M2
GLOBULIN SER-MCNC: 3.2 GM/DL (ref 2.4–3.5)
GLUCOSE SERPL-MCNC: 134 MG/DL (ref 82–115)
GLUCOSE SERPL-MCNC: 144 MG/DL (ref 70–110)
HCT VFR BLD AUTO: 55.5 % (ref 42–52)
HGB BLD-MCNC: 18.9 G/DL (ref 14–18)
IMM GRANULOCYTES # BLD AUTO: 0.01 X10(3)/MCL (ref 0–0.04)
IMM GRANULOCYTES NFR BLD AUTO: 0.1 %
LIPASE SERPL-CCNC: 27 U/L
LYMPHOCYTES # BLD AUTO: 3.24 X10(3)/MCL (ref 0.6–4.6)
LYMPHOCYTES NFR BLD AUTO: 46.9 %
MAGNESIUM SERPL-MCNC: 1.8 MG/DL (ref 1.6–2.6)
MCH RBC QN AUTO: 30.4 PG (ref 27–31)
MCHC RBC AUTO-ENTMCNC: 34.1 G/DL (ref 33–36)
MCV RBC AUTO: 89.4 FL (ref 80–94)
MONOCYTES # BLD AUTO: 0.41 X10(3)/MCL (ref 0.1–1.3)
MONOCYTES NFR BLD AUTO: 5.9 %
NEUTROPHILS # BLD AUTO: 3.17 X10(3)/MCL (ref 2.1–9.2)
NEUTROPHILS NFR BLD AUTO: 46 %
NRBC BLD AUTO-RTO: 0 %
PHOSPHATE SERPL-MCNC: 3.9 MG/DL (ref 2.3–4.7)
PLATELET # BLD AUTO: 212 X10(3)/MCL (ref 130–400)
PMV BLD AUTO: 10.4 FL (ref 7.4–10.4)
POCT GLUCOSE: 138 MG/DL (ref 70–110)
POCT GLUCOSE: 191 MG/DL (ref 70–110)
POTASSIUM SERPL-SCNC: 4.5 MMOL/L (ref 3.5–5.1)
PROT SERPL-MCNC: 6.8 GM/DL (ref 5.8–7.6)
RBC # BLD AUTO: 6.21 X10(6)/MCL (ref 4.7–6.1)
SODIUM SERPL-SCNC: 138 MMOL/L (ref 136–145)
WBC # SPEC AUTO: 6.91 X10(3)/MCL (ref 4.5–11.5)

## 2024-04-09 PROCEDURE — 25000003 PHARM REV CODE 250: Performed by: NURSE ANESTHETIST, CERTIFIED REGISTERED

## 2024-04-09 PROCEDURE — 82248 BILIRUBIN DIRECT: CPT | Performed by: INTERNAL MEDICINE

## 2024-04-09 PROCEDURE — S4991 NICOTINE PATCH NONLEGEND: HCPCS | Performed by: NURSE PRACTITIONER

## 2024-04-09 PROCEDURE — 0DB98ZX EXCISION OF DUODENUM, VIA NATURAL OR ARTIFICIAL OPENING ENDOSCOPIC, DIAGNOSTIC: ICD-10-PCS | Performed by: INTERNAL MEDICINE

## 2024-04-09 PROCEDURE — 88313 SPECIAL STAINS GROUP 2: CPT

## 2024-04-09 PROCEDURE — D9220A PRA ANESTHESIA: Mod: ANES,,, | Performed by: STUDENT IN AN ORGANIZED HEALTH CARE EDUCATION/TRAINING PROGRAM

## 2024-04-09 PROCEDURE — C9113 INJ PANTOPRAZOLE SODIUM, VIA: HCPCS | Performed by: INTERNAL MEDICINE

## 2024-04-09 PROCEDURE — 37000008 HC ANESTHESIA 1ST 15 MINUTES: Performed by: INTERNAL MEDICINE

## 2024-04-09 PROCEDURE — 83735 ASSAY OF MAGNESIUM: CPT | Performed by: INTERNAL MEDICINE

## 2024-04-09 PROCEDURE — 87902 NFCT AGT GNTYP ALYS HEP C: CPT | Performed by: HOSPITALIST

## 2024-04-09 PROCEDURE — 25000003 PHARM REV CODE 250: Performed by: NURSE PRACTITIONER

## 2024-04-09 PROCEDURE — 25000003 PHARM REV CODE 250: Performed by: INTERNAL MEDICINE

## 2024-04-09 PROCEDURE — 80053 COMPREHEN METABOLIC PANEL: CPT | Performed by: INTERNAL MEDICINE

## 2024-04-09 PROCEDURE — 63600175 PHARM REV CODE 636 W HCPCS: Performed by: HOSPITALIST

## 2024-04-09 PROCEDURE — 27201423 OPTIME MED/SURG SUP & DEVICES STERILE SUPPLY: Performed by: INTERNAL MEDICINE

## 2024-04-09 PROCEDURE — 83690 ASSAY OF LIPASE: CPT | Performed by: INTERNAL MEDICINE

## 2024-04-09 PROCEDURE — 87522 HEPATITIS C REVRS TRNSCRPJ: CPT | Performed by: HOSPITALIST

## 2024-04-09 PROCEDURE — 88312 SPECIAL STAINS GROUP 1: CPT

## 2024-04-09 PROCEDURE — 25000003 PHARM REV CODE 250: Performed by: PHYSICIAN ASSISTANT

## 2024-04-09 PROCEDURE — A9537 TC99M MEBROFENIN: HCPCS | Performed by: INTERNAL MEDICINE

## 2024-04-09 PROCEDURE — 11000001 HC ACUTE MED/SURG PRIVATE ROOM

## 2024-04-09 PROCEDURE — 63600175 PHARM REV CODE 636 W HCPCS: Performed by: INTERNAL MEDICINE

## 2024-04-09 PROCEDURE — 85025 COMPLETE CBC W/AUTO DIFF WBC: CPT | Performed by: INTERNAL MEDICINE

## 2024-04-09 PROCEDURE — 84100 ASSAY OF PHOSPHORUS: CPT | Performed by: INTERNAL MEDICINE

## 2024-04-09 PROCEDURE — 37000009 HC ANESTHESIA EA ADD 15 MINS: Performed by: INTERNAL MEDICINE

## 2024-04-09 PROCEDURE — 63600175 PHARM REV CODE 636 W HCPCS: Performed by: NURSE ANESTHETIST, CERTIFIED REGISTERED

## 2024-04-09 PROCEDURE — 88305 TISSUE EXAM BY PATHOLOGIST: CPT | Performed by: INTERNAL MEDICINE

## 2024-04-09 PROCEDURE — 0DB78ZX EXCISION OF STOMACH, PYLORUS, VIA NATURAL OR ARTIFICIAL OPENING ENDOSCOPIC, DIAGNOSTIC: ICD-10-PCS | Performed by: INTERNAL MEDICINE

## 2024-04-09 PROCEDURE — 43239 EGD BIOPSY SINGLE/MULTIPLE: CPT | Performed by: INTERNAL MEDICINE

## 2024-04-09 PROCEDURE — D9220A PRA ANESTHESIA: Mod: CRNA,,, | Performed by: NURSE ANESTHETIST, CERTIFIED REGISTERED

## 2024-04-09 RX ORDER — HYDRALAZINE HYDROCHLORIDE 20 MG/ML
10 INJECTION INTRAMUSCULAR; INTRAVENOUS EVERY 4 HOURS PRN
Status: DISCONTINUED | OUTPATIENT
Start: 2024-04-09 | End: 2024-04-12 | Stop reason: HOSPADM

## 2024-04-09 RX ORDER — SODIUM, POTASSIUM,MAG SULFATES 17.5-3.13G
1 SOLUTION, RECONSTITUTED, ORAL ORAL 2 TIMES DAILY
Status: COMPLETED | OUTPATIENT
Start: 2024-04-09 | End: 2024-04-10

## 2024-04-09 RX ORDER — SUCRALFATE 1 G/1
1 TABLET ORAL 4 TIMES DAILY
Status: DISCONTINUED | OUTPATIENT
Start: 2024-04-09 | End: 2024-04-12 | Stop reason: HOSPADM

## 2024-04-09 RX ORDER — LISINOPRIL 20 MG/1
40 TABLET ORAL DAILY
Status: DISCONTINUED | OUTPATIENT
Start: 2024-04-09 | End: 2024-04-12 | Stop reason: HOSPADM

## 2024-04-09 RX ORDER — LABETALOL HYDROCHLORIDE 5 MG/ML
10 INJECTION, SOLUTION INTRAVENOUS EVERY 4 HOURS PRN
Status: DISCONTINUED | OUTPATIENT
Start: 2024-04-09 | End: 2024-04-12 | Stop reason: HOSPADM

## 2024-04-09 RX ORDER — KIT FOR THE PREPARATION OF TECHNETIUM TC 99M MEBROFENIN 45 MG/10ML
8 INJECTION, POWDER, LYOPHILIZED, FOR SOLUTION INTRAVENOUS
Status: COMPLETED | OUTPATIENT
Start: 2024-04-09 | End: 2024-04-09

## 2024-04-09 RX ORDER — PROPOFOL 10 MG/ML
VIAL (ML) INTRAVENOUS
Status: DISCONTINUED | OUTPATIENT
Start: 2024-04-09 | End: 2024-04-09

## 2024-04-09 RX ORDER — SODIUM CHLORIDE, SODIUM GLUCONATE, SODIUM ACETATE, POTASSIUM CHLORIDE AND MAGNESIUM CHLORIDE 30; 37; 368; 526; 502 MG/100ML; MG/100ML; MG/100ML; MG/100ML; MG/100ML
INJECTION, SOLUTION INTRAVENOUS
Status: DISCONTINUED | OUTPATIENT
Start: 2024-04-09 | End: 2024-04-09

## 2024-04-09 RX ORDER — IBUPROFEN 200 MG
1 TABLET ORAL DAILY
Status: DISCONTINUED | OUTPATIENT
Start: 2024-04-09 | End: 2024-04-12 | Stop reason: HOSPADM

## 2024-04-09 RX ORDER — PANTOPRAZOLE SODIUM 40 MG/10ML
40 INJECTION, POWDER, LYOPHILIZED, FOR SOLUTION INTRAVENOUS EVERY 12 HOURS
Status: DISCONTINUED | OUTPATIENT
Start: 2024-04-09 | End: 2024-04-12 | Stop reason: HOSPADM

## 2024-04-09 RX ORDER — PROPOFOL 10 MG/ML
VIAL (ML) INTRAVENOUS
Status: COMPLETED
Start: 2024-04-09 | End: 2024-04-09

## 2024-04-09 RX ADMIN — PROPOFOL 50 MG: 10 INJECTION, EMULSION INTRAVENOUS at 12:04

## 2024-04-09 RX ADMIN — SUCRALFATE 1 G: 1 TABLET ORAL at 04:04

## 2024-04-09 RX ADMIN — PANTOPRAZOLE SODIUM 40 MG: 40 INJECTION, POWDER, FOR SOLUTION INTRAVENOUS at 08:04

## 2024-04-09 RX ADMIN — INSULIN ASPART 2 UNITS: 100 INJECTION, SOLUTION INTRAVENOUS; SUBCUTANEOUS at 05:04

## 2024-04-09 RX ADMIN — SODIUM SULFATE, POTASSIUM SULFATE, MAGNESIUM SULFATE 177 ML: 17.5; 3.13; 1.6 SOLUTION, CONCENTRATE ORAL at 05:04

## 2024-04-09 RX ADMIN — SODIUM CHLORIDE, SODIUM GLUCONATE, SODIUM ACETATE, POTASSIUM CHLORIDE AND MAGNESIUM CHLORIDE 200 ML: 526; 502; 368; 37; 30 INJECTION, SOLUTION INTRAVENOUS at 12:04

## 2024-04-09 RX ADMIN — HYDRALAZINE HYDROCHLORIDE 10 MG: 20 INJECTION INTRAMUSCULAR; INTRAVENOUS at 08:04

## 2024-04-09 RX ADMIN — PANTOPRAZOLE SODIUM 40 MG: 40 INJECTION, POWDER, FOR SOLUTION INTRAVENOUS at 12:04

## 2024-04-09 RX ADMIN — PROPOFOL 100 MG: 10 INJECTION, EMULSION INTRAVENOUS at 12:04

## 2024-04-09 RX ADMIN — ENOXAPARIN SODIUM 40 MG: 40 INJECTION SUBCUTANEOUS at 04:04

## 2024-04-09 RX ADMIN — SODIUM CHLORIDE, POTASSIUM CHLORIDE, SODIUM LACTATE AND CALCIUM CHLORIDE: 600; 310; 30; 20 INJECTION, SOLUTION INTRAVENOUS at 01:04

## 2024-04-09 RX ADMIN — SUCRALFATE 1 G: 1 TABLET ORAL at 08:04

## 2024-04-09 RX ADMIN — KIT FOR THE PREPARATION OF TECHNETIUM TC 99M MEBROFENIN 8 MILLICURIE: 45 INJECTION, POWDER, LYOPHILIZED, FOR SOLUTION INTRAVENOUS at 09:04

## 2024-04-09 RX ADMIN — NICOTINE 1 PATCH: 21 PATCH, EXTENDED RELEASE TRANSDERMAL at 04:04

## 2024-04-09 NOTE — CONSULTS
Consult Note    Reason for Consult:      We were consulted by Dr. Artis to evaluate this patient for acute on chronic abdominal pain with abnormal CT.       HPI:     Patient is a 68 year old AA male who is unknown to our group with a pmhx of HTN, DM, and chronic low back pain.    He presented to [facility] ER 4/8 with complaints of worsening abdominal pain.  Upon arrival, afebrile and hypertensive.  Labs: H&H 19.7/56.7, , ALT 89, t bili 0.7, alp 98.  US abdomen: Cholelithiasis with positive Jurado sign, no gallbladder wall thickening or clear ascites. Appearance is equivocal for early cholecystitis. No biliary dilatation.  CBD diameter 3 mm. CT abd pelv with iv ct: Edema and hyperemia of the distal stomach and gastritis. diffuse scattered mural atheromatous calcifications in the aortoiliac system.   He was admitted with a general surgery consult and HIDA pending.    GI consulted for eval of possible PUD, chronic abdominal pain and CT findings. Initiated on PPI BID and carafate QID.    Today, patient resting in bed, family at bedside. Reports he had been suffering from abdominal pain for over a year. He was previously incarcerated for 1.5 years and released in January 2024.  Patient reports being seen recently at [facility] ER. 3/18 US showed hepatic steatosis. gallbladder full of stones. Common bile duct measures 0.65 cm, no dilatation. He was discharged with colace, protonix 20, indocin bid.   No NSAIDs other than that prior.  Previously was on metformin for DM which he stopped due to being recalled. The pain is located in his RLQ and mid lower abdomen - described as a constant cramping sensation. It was improved since admission with pain medication. The pain is worse 15 minutes after eating. He does have an appetite but has had a 15-20 pound unintentional weight loss within the last month. Denies dysphagia, nausea or vomiting. Has had diarrhea for the last month, with up to 8 liquid stools at day. His last BM was  yesterday which was formed. He did not take anything at home for his diarrhea. Previously, was having a regular BM daily. Denies any black stools or BRB.    He is a tobacco smoker since the age of 17, 1 pack lasts him 1.5 days. Drinks 2 beers daily. Denies drug use. No previous EGD/colonoscopy. Denies family history of colon cancer, esophageal cancer, gallbladder disease or other GI conditions.    PCP:  Marilu Amezquita FNP    Review of patient's allergies indicates:  No Known Allergies     Current Facility-Administered Medications   Medication Dose Route Frequency Provider Last Rate Last Admin    acetaminophen tablet 650 mg  650 mg Oral Q4H PRN Calya Artis MD        aluminum-magnesium hydroxide-simethicone 200-200-20 mg/5 mL suspension 30 mL  30 mL Oral QID PRN Cayla Artis MD        dextrose 10% bolus 125 mL 125 mL  12.5 g Intravenous PRN Cayla Artis MD        dextrose 10% bolus 250 mL 250 mL  25 g Intravenous PRN Cayla Artis MD        enoxaparin injection 40 mg  40 mg Subcutaneous Q24H (prophylaxis, 1700) Cayla Artis MD        glucagon (human recombinant) injection 1 mg  1 mg Intramuscular PRN Cayla Artis MD        hydrALAZINE injection 10 mg  10 mg Intravenous Q4H PRN Zain Abernathy MD   10 mg at 04/09/24 0824    insulin aspart U-100 injection 1-10 Units  1-10 Units Subcutaneous Q6H PRN Cayla Artis MD   2 Units at 04/08/24 2221    labetaloL injection 10 mg  10 mg Intravenous Q4H PRN Zain Abernathy MD        lactated ringers infusion   Intravenous Continuous Cayla Artis  mL/hr at 04/08/24 2155 New Bag at 04/08/24 2155    lisinopriL tablet 40 mg  40 mg Oral Daily Regine Scales AGACNP-BC        melatonin tablet 6 mg  6 mg Oral Nightly PRN Cayla Artis MD        nicotine 21 mg/24 hr 1 patch  1 patch Transdermal Daily Regine Scales AGACNP-BC        ondansetron injection 4 mg  4 mg Intravenous Q4H PRN Cayla Artis MD        pantoprazole injection 40 mg  40 mg Intravenous Q12H Chandra  Cayla CARLISLE MD   40 mg at 04/09/24 0015    polyethylene glycol packet 17 g  17 g Oral BID PRN Cayla Artis MD        prochlorperazine injection Soln 5 mg  5 mg Intravenous Q6H PRN Cayla Artis MD        senna-docusate 8.6-50 mg per tablet 2 tablet  2 tablet Oral BID PRN Cayla Artis MD        sodium chloride 0.9% flush 10 mL  10 mL Intravenous PRN Cayla Artis MD        sucralfate tablet 1 g  1 g Oral QID Cayla Artis MD         Medications Prior to Admission   Medication Sig Dispense Refill Last Dose    indomethacin (INDOCIN) 25 MG capsule Take 1 capsule (25 mg total) by mouth 2 (two) times daily as needed (pain). 14 capsule 0 4/7/2024    lisinopriL (PRINIVIL,ZESTRIL) 40 MG tablet Take 1 tablet (40 mg total) by mouth once daily. 90 tablet 1 4/8/2024    metFORMIN (GLUCOPHAGE) 1000 MG tablet Take 1 tablet (1,000 mg total) by mouth 2 (two) times daily with meals. 180 tablet 1 4/7/2024    pantoprazole (PROTONIX) 20 MG tablet Take 1 tablet (20 mg total) by mouth once daily. 30 tablet 6 4/7/2024    traMADoL (ULTRAM) 50 mg tablet Take 1 tablet (50 mg total) by mouth every 12 (twelve) hours as needed for Pain. 12 tablet 0 Unknown       Past Medical History:  Past Medical History:   Diagnosis Date    Chronic low back pain with left-sided sciatica     Diabetes mellitus     HTN (hypertension)       Past Surgical History:  Past Surgical History:   Procedure Laterality Date    HIP SURGERY Left     ORIF HIP FRACTURE Right     >10 years      Family History:  Family History   Problem Relation Age of Onset    No Known Problems Mother     Diabetes Father      Social History:  Social History     Tobacco Use    Smoking status: Every Day     Current packs/day: 0.50     Average packs/day: 0.5 packs/day for 52.3 years (26.1 ttl pk-yrs)     Types: Cigarettes     Start date: 1/1/1972    Smokeless tobacco: Never   Substance Use Topics    Alcohol use: Not Currently       Review of Systems:     Review of Systems   Constitutional:  Positive  for unexpected weight change. Negative for appetite change, chills, fatigue and fever.   HENT:  Negative for trouble swallowing.    Respiratory:  Negative for cough, chest tightness, shortness of breath and wheezing.    Cardiovascular:  Negative for chest pain, palpitations and leg swelling.   Gastrointestinal:  Positive for abdominal pain and diarrhea. Negative for abdominal distention, blood in stool, constipation, nausea and vomiting.   Musculoskeletal:  Negative for arthralgias and back pain.   Skin:  Negative for color change and pallor.   Neurological:  Negative for dizziness, syncope, weakness, light-headedness and headaches.   Psychiatric/Behavioral:  Negative for agitation and confusion. The patient is not nervous/anxious.        Objective:     VITAL SIGNS: 24 HR MIN & MAX LAST    Temp  Min: 98.3 °F (36.8 °C)  Max: 98.3 °F (36.8 °C)  98.3 °F (36.8 °C)        BP  Min: 155/99  Max: 181/98  (!) 174/102     Pulse  Min: 76  Max: 92  76     Resp  Min: 18  Max: 19  19    SpO2  Min: 97 %  Max: 99 %  98 %      No intake or output data in the 24 hours ending 04/09/24 1012    Physical Exam  Constitutional:       General: He is not in acute distress.     Appearance: He is not ill-appearing or toxic-appearing.      Comments: thin   HENT:      Head: Normocephalic and atraumatic.      Mouth/Throat:      Mouth: Mucous membranes are moist.      Pharynx: Oropharynx is clear.   Eyes:      General: No scleral icterus.  Cardiovascular:      Rate and Rhythm: Normal rate and regular rhythm.      Pulses: Normal pulses.      Heart sounds: Normal heart sounds.   Pulmonary:      Effort: Pulmonary effort is normal. No respiratory distress.      Breath sounds: Normal breath sounds. No stridor. No wheezing or rales.   Abdominal:      General: Abdomen is flat. Bowel sounds are normal. There is no distension.      Palpations: Abdomen is soft.      Tenderness: There is abdominal tenderness (RLQ). There is no guarding.      Comments:  Scaphoid abdomen with excess skin as testament to weight loss   Musculoskeletal:         General: Normal range of motion.      Right lower leg: No edema.      Left lower leg: No edema.   Skin:     General: Skin is warm and dry.   Neurological:      Mental Status: He is alert and oriented to person, place, and time.   Psychiatric:         Mood and Affect: Mood normal.         Behavior: Behavior normal.         Thought Content: Thought content normal.         Judgment: Judgment normal.           Recent Results (from the past 48 hour(s))   CBC with Differential    Collection Time: 04/08/24  2:48 PM   Result Value Ref Range    WBC 7.69 4.50 - 11.50 x10(3)/mcL    RBC 6.45 (H) 4.70 - 6.10 x10(6)/mcL    Hgb 19.7 (H) 14.0 - 18.0 g/dL    Hct 56.7 (H) 42.0 - 52.0 %    MCV 87.9 80.0 - 94.0 fL    MCH 30.5 27.0 - 31.0 pg    MCHC 34.7 33.0 - 36.0 g/dL    RDW 11.6 11.5 - 17.0 %    Platelet 250 130 - 400 x10(3)/mcL    MPV 10.5 (H) 7.4 - 10.4 fL    Neut % 50.0 %    Lymph % 41.9 %    Mono % 7.0 %    Eos % 0.3 %    Basophil % 0.5 %    Lymph # 3.22 0.6 - 4.6 x10(3)/mcL    Neut # 3.85 2.1 - 9.2 x10(3)/mcL    Mono # 0.54 0.1 - 1.3 x10(3)/mcL    Eos # 0.02 0 - 0.9 x10(3)/mcL    Baso # 0.04 <=0.2 x10(3)/mcL    IG# 0.02 0 - 0.04 x10(3)/mcL    IG% 0.3 %    NRBC% 0.0 %   Comprehensive Metabolic Panel    Collection Time: 04/08/24  2:48 PM   Result Value Ref Range    Sodium Level 139 136 - 145 mmol/L    Potassium Level 4.3 3.5 - 5.1 mmol/L    Chloride 104 98 - 107 mmol/L    Carbon Dioxide 26 23 - 31 mmol/L    Glucose Level 224 (H) 82 - 115 mg/dL    Blood Urea Nitrogen 19.4 8.4 - 25.7 mg/dL    Creatinine 0.94 0.73 - 1.18 mg/dL    Calcium Level Total 9.8 8.8 - 10.0 mg/dL    Protein Total 7.8 (H) 5.8 - 7.6 gm/dL    Albumin Level 3.9 3.4 - 4.8 g/dL    Globulin 3.9 (H) 2.4 - 3.5 gm/dL    Albumin/Globulin Ratio 1.0 (L) 1.1 - 2.0 ratio    Bilirubin Total 0.7 <=1.5 mg/dL    Alkaline Phosphatase 98 40 - 150 unit/L    Alanine Aminotransferase 89 (H) 0 -  55 unit/L    Aspartate Aminotransferase 100 (H) 5 - 34 unit/L    eGFR >60 mls/min/1.73/m2   Lipase    Collection Time: 04/08/24  2:48 PM   Result Value Ref Range    Lipase Level 16 <=60 U/L   Magnesium    Collection Time: 04/08/24  2:48 PM   Result Value Ref Range    Magnesium Level 2.00 1.60 - 2.60 mg/dL   Urinalysis, Reflex to Urine Culture    Collection Time: 04/08/24  9:44 PM    Specimen: Urine   Result Value Ref Range    Color, UA Yellow Yellow, Light-Yellow, Colorless, Straw, Dark-Yellow    Appearance, UA Turbid (A) Clear    Specific Gravity, UA 1.024 1.005 - 1.030    pH, UA 8.0 5.0 - 8.5    Protein, UA 2+ (A) Negative    Glucose, UA 3+ (A) Negative, Normal    Ketones, UA Negative Negative    Blood, UA Negative Negative    Bilirubin, UA Negative Negative    Urobilinogen, UA Normal 0.2, 1.0, Normal    Nitrites, UA Negative Negative    Leukocyte Esterase, UA 25 (A) Negative    WBC, UA 6-10 (A) None Seen, 0-2, 3-5, 0-5 /HPF    Bacteria, UA Trace None Seen, Trace /HPF    Squamous Epithelial Cells, UA Trace None Seen /HPF    Mucous, UA Moderate (A) None Seen /LPF    Triple Phosphate Crystals, UA Moderate (A) None Seen /HPF    Amorphous Crystal, UA Moderate /uL    RBC, UA 0-5 None Seen, 0-2, 3-5, 0-5 /HPF   POCT glucose    Collection Time: 04/08/24  9:57 PM   Result Value Ref Range    POCT Glucose 160 (H) 70 - 110 mg/dL   Comprehensive Metabolic Panel    Collection Time: 04/09/24  5:12 AM   Result Value Ref Range    Sodium Level 138 136 - 145 mmol/L    Potassium Level 4.5 3.5 - 5.1 mmol/L    Chloride 106 98 - 107 mmol/L    Carbon Dioxide 23 23 - 31 mmol/L    Glucose Level 134 (H) 82 - 115 mg/dL    Blood Urea Nitrogen 16.8 8.4 - 25.7 mg/dL    Creatinine 0.84 0.73 - 1.18 mg/dL    Calcium Level Total 9.7 8.8 - 10.0 mg/dL    Protein Total 6.8 5.8 - 7.6 gm/dL    Albumin Level 3.6 3.4 - 4.8 g/dL    Globulin 3.2 2.4 - 3.5 gm/dL    Albumin/Globulin Ratio 1.1 1.1 - 2.0 ratio    Bilirubin Total 1.0 <=1.5 mg/dL    Alkaline  Phosphatase 84 40 - 150 unit/L    Alanine Aminotransferase 80 (H) 0 - 55 unit/L    Aspartate Aminotransferase 62 (H) 5 - 34 unit/L    eGFR >60 mls/min/1.73/m2   Phosphorus    Collection Time: 04/09/24  5:12 AM   Result Value Ref Range    Phosphorus Level 3.9 2.3 - 4.7 mg/dL   Magnesium    Collection Time: 04/09/24  5:12 AM   Result Value Ref Range    Magnesium Level 1.80 1.60 - 2.60 mg/dL   Lipase    Collection Time: 04/09/24  5:12 AM   Result Value Ref Range    Lipase Level 27 <=60 U/L   Bilirubin, Direct    Collection Time: 04/09/24  5:12 AM   Result Value Ref Range    Bilirubin Direct 0.3 0.0 - <0.5 mg/dL   CBC with Differential    Collection Time: 04/09/24  5:12 AM   Result Value Ref Range    WBC 6.91 4.50 - 11.50 x10(3)/mcL    RBC 6.21 (H) 4.70 - 6.10 x10(6)/mcL    Hgb 18.9 (H) 14.0 - 18.0 g/dL    Hct 55.5 (H) 42.0 - 52.0 %    MCV 89.4 80.0 - 94.0 fL    MCH 30.4 27.0 - 31.0 pg    MCHC 34.1 33.0 - 36.0 g/dL    RDW 11.5 11.5 - 17.0 %    Platelet 212 130 - 400 x10(3)/mcL    MPV 10.4 7.4 - 10.4 fL    Neut % 46.0 %    Lymph % 46.9 %    Mono % 5.9 %    Eos % 0.4 %    Basophil % 0.7 %    Lymph # 3.24 0.6 - 4.6 x10(3)/mcL    Neut # 3.17 2.1 - 9.2 x10(3)/mcL    Mono # 0.41 0.1 - 1.3 x10(3)/mcL    Eos # 0.03 0 - 0.9 x10(3)/mcL    Baso # 0.05 <=0.2 x10(3)/mcL    IG# 0.01 0 - 0.04 x10(3)/mcL    IG% 0.1 %    NRBC% 0.0 %       CT Abdomen Pelvis With IV Contrast NO Oral Contrast    Result Date: 4/8/2024  EXAMINATION: CT ABDOMEN PELVIS WITH IV CONTRAST CLINICAL HISTORY: Epigastric pain;Nausea/vomiting; TECHNIQUE: Multidetector IV contrast enhanced axial CT images of the abdomen and pelvis were obtained with coronal and sagittal reconstructions. Automatic exposure control was utilized to reduce the patient's radiation dose. DLP= 320 COMPARISON: 03/30/2024 FINDINGS: 01. HEPATOBILIARY: No focal hepatic lesion is identified, stones scattered throughout the gallbladder. 02. SPLEEN: Normal 03. PANCREAS: No focal masses or ductal  dilatation. 04. ADRENALS: No adrenal nodules. 05. KIDNEYS: The right kidney demonstrates no stone, hydronephrosis, or hydroureter. No focal mass identified. The left kidney demonstrates no stone, hydronephrosis, or hydroureter. No focal mass identified. 06. LYMPHADENOPATHY/RETROPERITONEUM: There is no retroperitoneal lymphadenopathy. The abdominal aorta is normal in course and caliber. There are diffuse scattered mural atheromatous calcifications in the aortoiliac system. 07. BOWEL: Edema and hyperemia of the distal stomach.  No evidence of appendiceal inflammation. 08. PELVIC VISCERA: Normal. No pelvic mass. 09. PELVIC LYMPH NODES: No lymphadenopathy. 10. PERITONEUM/ABDOMINAL WALL: No ascites or implant. 11. SKELETAL: No aggressive appearing lytic/blastic lesion. No acute fractures, subluxations or dislocations. 12. LUNG BASES: The visualized lungs are unremarkable.     Edema and hyperemia of the distal stomach.  Gastritis is suspected. Electronically signed by: Tai Valiente Date:    04/08/2024 Time:    21:43    US Abdomen Limited    Result Date: 4/8/2024  EXAMINATION: US ABDOMEN LIMITED CLINICAL HISTORY: RUQ pain; TECHNIQUE: Gray-scale and color Doppler ultrasound images of the abdomen. COMPARISON: Ultrasound 03/27/2024 FINDINGS: Normal pancreas.  Normal caliber of the superior IVC. Liver has normal size and echogenicity.  Main portal vein patent with normal flow direction.  Normal CBD caliber.  Multiple shadowing gallstones.  No gallbladder wall thickening or significant ascites.  Positive sonographic Jurado sign. No hydronephrosis or defined calcification in the right kidney. Measurements: - Liver: 15.6 cm - CBD diameter: 3 mm - Right kidney: 10.6 cm in length     Cholelithiasis with positive sonographic Jurado sign, but no gallbladder wall thickening or clear ascites.  Appearance is equivocal for early cholecystitis.  No biliary dilatation. Electronically signed by: Ottoniel Michel Date:    04/08/2024  Time:    16:23    CT Abdomen Pelvis With IV Contrast NO Oral Contrast    Result Date: 3/30/2024  EXAMINATION: CT ABDOMEN PELVIS WITH IV CONTRAST CLINICAL HISTORY: Abdominal pain, acute, nonlocalized;Nausea/vomiting;Gallstones seen on US; TECHNIQUE: Multidetector axial images were obtained of the abdomen and pelvis following the administration of IV contrast. Oral contrast was not administered. Dose length product of 124 mGycm. Automated exposure control was utilized to minimize radiation dose. COMPARISON: Ultrasound abdomen March 27, 2024 FINDINGS: Included portion of the lungs are without suspicious nodularity, acute air space infiltrates or fluid within the pleural spaces. Liver is remarkable for steatosis without focal space occupying lesion.  Gallbladder lumen is remarkable for numerous calculi and there is slightly thickened gallbladder wall.  No apparent pericholecystic fluid.  There is also no significant dilatation of the intrahepatic or the extrahepatic ducts. Pancreatic unremarkable attenuation without acute peripancreatic phlegmons. Main pancreatic duct is not dilated. Spleen is of normal size without focal lesion. The adrenal glands appear within normal limits. The kidneys are unremarkable in size and contour. No solid or cystic renal lesion identified. There is no hydronephrosis. No perinephric fluid strandings or collections identified.  There is calcified plaque and mural thrombus involving the abdominal aorta and the iliac arteries with maximum diameter of 2.0 cm. Stomach is mostly decompressed.  No abnormal dilatation of loops of small bowel and there is no focal or generalized mural thickening.  Appendix is nondilated and partially air-filled on image 36 series 6.  There is colonic fecal loading throughout consistent with constipation.  There is limited assessment of the colon.  There are no acute pericolonic strandings or evidence of bowel obstruction. Urinary bladder wall is not thickened.  There  is no pelvic free fluid. Left hip appear ears ORIF.  No acute or otherwise osseous abnormality identified.     1. Colonic fecal loading throughout is consistent with constipation.  No bowel obstruction.  No pericolonic acute strandings.  Please correlate patient is up-to-date on colonoscopy. 2. Multiple gallstones and slightly thickened gallbladder wall without pericholecystic fluid or dilatation of ducts. 3. Hepatic steatosis. Electronically signed by: Pedro Garcia Date:    03/30/2024 Time:    20:57    US Abdomen Limited    Result Date: 3/27/2024  EXAMINATION: US ABDOMEN LIMITED CLINICAL HISTORY: , Unspecified viral hepatitis C without hepatic coma. TECHNIQUE: Transverse and longitudinal images of the right upper abdomen were obtained. COMPARISON: None FINDINGS: Liver: Size: 15.1 cm in the right midclavicular line, normal Appearance: There is some heterogenicity of the liver parenchyma with slight increased echogenicity increased echogenicity decreases sensitivity to detect focal lesions Mass: No focal masses Gallbladder: Stones/Sludge: Extensive shadowing is identified emanating from the region of the gallbladder which might represent a wall enhancing sign and reflect a gallbladder full of stones Appearance: No wall thickening, pericholecystic fluid or hydrops Sonographic Jurado's Sign: Negative Bile Ducts: Intrahepatic Ducts: No dilatation Extrahepatic Ducts: Common bile duct measures 0.65 cm, no dilatation Pancreas: Visualized portions of the pancreas are normal. Right Kidney: Size: 11.5 cm in length Echogenicity: Normal Collecting System: No hydronephrosis Stone: None Cyst/Mass: None Vessels: Aorta: Visualized portions are normal. Inferior Vena Cava: Visualized portions are normal. Main Portal Vein: Patent with hepatopedal  flow. Free Fluid: No ascites or pleural effusions     Heterogenicity of the liver parenchyma with changes of hepatic steatosis. RHONDA sign suggestive of a gallbladder full of stones. No other  significant abnormalities Electronically signed by: Travis Del Angel Date:    03/27/2024 Time:    09:38    X-Ray Lumbar Spine 2 Or 3 Views    Result Date: 3/18/2024  EXAMINATION: XR LUMBAR SPINE 2 OR 3 VIEWS CPT: 24798 CLINICAL HISTORY: Lumbago with sciatica, left side FINDINGS: There are 5 non rib-bearing vertebral bodies vertebral bodies are normal height, position and alignment some degenerative changes identified with marginal osteophytes at some levels as well as degenerative changes of the posterior elements at L4-L5 and L5-S1. No acute fractures or dislocations identified     Degenerative changes Electronically signed by: Travis Del Angel Date:    03/18/2024 Time:    07:12    X-Ray Hip 2 or 3 views Left (with Pelvis when performed)    Result Date: 3/18/2024  EXAMINATION: XR HIP WITH PELVIS WHEN PERFORMED, 2 OR 3 VIEWS LEFT CLINICAL HISTORY: Lumbago with sciatica, left side COMPARISON: None. FINDINGS: No acute displaced fractures or dislocations. Nail and plate and orthopedic screws identified some degenerative changes are identified of the inferior medial aspect of the hip joint articular spaces are otherwise preserved with smooth articular surfaces with some degenerative changes of the contralateral hip and lumbosacral spine No blastic or lytic lesions. Soft tissues within normal limits.     Hardware in the left hip appears to be intact. Degenerative changes. Electronically signed by: Travis Del Angel Date:    03/18/2024 Time:    07:11      Imaging personally reviewed by myself and SP.    Assessment / Plan:     68 year old AA male who is unknown to our group with a pmhx of HTN, DM, and chronic low back pain. Presented to [facility] ER 4/8 with complaints of abdominal pain. GI consulted for eval of possible PUD as well as acute on chronic abdominal pain and ct.    Acute on chronic abdominal pain  US abdomen 4/8: Cholelithiasis with positive Jurado sign, no gallbladder wall thickening or clear ascites.  Appearance is equivocal for early cholecystitis. CBD diameter 3 mm.     Edema and hyperemia of distal stomach w/gastritis found on CT    3. Diarrhea / Change in bowel habits   Having 8 episodes of loose stools daily. Formed stool yesterday. No Bm today.    4. Unintentional Weight loss    5. Hepatitis C antibody reactive  6. Elevated LFTs - US with hepatic steatosis and drinks daily    -Hida pending w/surgery on board. Will f/u.  Not c/w acute cholecystitis, but biliary colic on differential however pain lower.   -Continue PPI BID and carafate QID  -Keep NPO for EGD today  -Diarrhea appears to have resolved. If he has more episodes, can consider GI panel w/cdiff for completeness, but given overall clinical picture ultimately needs colonoscopy. Overflow diarrhea also on ddx given CT suggestive of constipation.   -Obtain Hepatitis C RNA/PCR   -Given history, would recommend GI work up prior to cholecystectomy (if HIDA negative for acute cholecystitis) to include EGD/colonoscopy and evaluate for mesenteric ischemia if endoscopy negative.      Yamileth Stockton PA-C  Thank you for allowing us to participate in this patient's care.

## 2024-04-09 NOTE — ANESTHESIA PREPROCEDURE EVALUATION
04/09/2024  Heath Holder is a 68 y.o., male.    Chronic low back pain with left-sided sciatica    Other Medical History   HTN (hypertension) Diabetes mellitus     Surgical History  ORIF HIP FRACTURE HIP SURGERY     Na 138, K 4.5, Cr 0.84  19 / 55 / 212k    Hx of htn, niddm, lbp, tobacco abuse, daily alcohol usage, hepc+, here for egd for concern for pud based on abdominal pain and CT findings.      Pre-op Assessment    I have reviewed the Patient Summary Reports.     I have reviewed the Nursing Notes. I have reviewed the NPO Status.   I have reviewed the Medications.     Review of Systems  Anesthesia Hx:               Denies Personal Hx of Anesthesia complications.                    Cardiovascular:     Hypertension                                        Pulmonary:  Pulmonary Normal                       Hepatic/GI:  Hepatic/GI Normal                 Neurological:    Neuromuscular Disease, (chronic low back pain with sciatica)                                   Endocrine:  Diabetes               Physical Exam  General: Well nourished, Cooperative and Alert    Airway:  Mallampati: I   Mouth Opening: Normal  TM Distance: Normal    Dental:  Periodontal disease  Poor dentition, multiple missing  No loose teeth  Chest/Lungs:  Normal Respiratory Rate    Heart:  Rate: Normal        Anesthesia Plan  Type of Anesthesia, risks & benefits discussed:    Anesthesia Type: Gen Natural Airway  Intra-op Monitoring Plan: Standard ASA Monitors  Post Op Pain Control Plan: IV/PO Opioids PRN  Induction:  IV  Informed Consent: Informed consent signed with the Patient and all parties understand the risks and agree with anesthesia plan.  All questions answered.   ASA Score: 3  Day of Surgery Review of History & Physical: H&P Update referred to the surgeon/provider.    Ready For Surgery From Anesthesia Perspective.     .

## 2024-04-09 NOTE — NURSING
Admit questions answered by pt, no need for further questioning. Pt states loss of appetite is d/t being in pain which caused him to lose roughly 10-15lbs. No concerns noted, pt states he ambulates independently, no vision/hearing/concentration impairments stated. Pt resting comfortably in bed.

## 2024-04-09 NOTE — PLAN OF CARE
04/09/24 1116   Discharge Assessment   Assessment Type Discharge Planning Assessment   Confirmed/corrected address, phone number and insurance Yes   Confirmed Demographics Correct on Facesheet   Source of Information patient   Communicated ZULEYMA with patient/caregiver Date not available/Unable to determine   Reason For Admission calculus of the gallbladder without obstruction   People in Home alone   Do you expect to return to your current living situation? Yes   Do you have help at home or someone to help you manage your care at home? Yes   Who are your caregiver(s) and their phone number(s)? Carmen - significant other   Prior to hospitilization cognitive status: Alert/Oriented   Current cognitive status: Alert/Oriented   Walking or Climbing Stairs Difficulty no   Dressing/Bathing Difficulty no   Do you have any problems with:   (does not drive so needs assist)   Home Accessibility wheelchair accessible   Home Layout Able to live on 1st floor   Equipment Currently Used at Home none   Readmission within 30 days? No   Patient currently being followed by outpatient case management? No   Do you currently have service(s) that help you manage your care at home? No   Do you take prescription medications? Yes   Do you have prescription coverage? Yes   Do you have any problems affording any of your prescribed medications? Yes  (all medicines copays together about 40.oo/ month)   Is the patient taking medications as prescribed? yes   Who is going to help you get home at discharge? Carmen   How do you get to doctors appointments? family or friend will provide   Are you on dialysis? No   Do you take coumadin? No   Discharge Plan A Home   Discharge Plan B Home   DME Needed Upon Discharge  none   Discharge Plan discussed with: Patient;Spouse/sig other   Name(s) and Number(s) Carmen at bedside   Transition of Care Barriers None

## 2024-04-09 NOTE — PLAN OF CARE
Patient complaining about copays for medicine would like states that at his age he should not be paying for medicine unsure who his part d plan is with

## 2024-04-09 NOTE — PROVATION PATIENT INSTRUCTIONS
Discharge Summary/Instructions after an Endoscopic Procedure  Patient Name: Heath Holder  Patient MRN: [id number]  Patient YOB: 1955  Tuesday, April 9, 2024  Ravi Petit MD  Dear patient,  As a result of recent federal legislation (The Federal Cures Act), you may   receive lab or pathology results from your procedure in your [facility]   account before your physician is able to contact you. Your physician or   their representative will relay the results to you with their   recommendations at their soonest availability.  Thank you,  RESTRICTIONS:  During your procedure today, you received medications for sedation.  These   medications may affect your judgment, balance and coordination.  Therefore,   for 24 hours, you have the following restrictions:   - DO NOT drive a car, operate machinery, make legal/financial decisions,   sign important papers or drink alcohol.    ACTIVITY:  Today: no heavy lifting, straining or running due to procedural   sedation/anesthesia.  The following day: return to full activity including work.  DIET:  Eat and drink normally unless instructed otherwise.     TREATMENT FOR COMMON SIDE EFFECTS:  - Mild abdominal pain, nausea, belching, bloating or excessive gas:  rest,   eat lightly and use a heating pad.  - Sore Throat: treat with throat lozenges and/or gargle with warm salt   water.  - Because air was used during the procedure, expelling large amounts of air   from your rectum or belching is normal.  - If a bowel prep was taken, you may not have a bowel movement for 1-3 days.    This is normal.  SYMPTOMS TO WATCH FOR AND REPORT TO YOUR PHYSICIAN:  1. Abdominal pain or bloating, other than gas cramps.  2. Chest pain.  3. Back pain.  4. Signs of infection such as: chills or fever occurring within 24 hours   after the procedure.  5. Rectal bleeding, which would show as bright red, maroon, or black stools.   (A tablespoon of blood from the rectum is not serious, especially if    hemorrhoids are present.)  6. Vomiting.  7. Weakness or dizziness.  GO DIRECTLY TO THE NEAREST EMERGENCY ROOM IF YOU HAVE ANY OF THE FOLLOWING:      Difficulty breathing              Chills and/or fever over 101 F   Persistent vomiting and/or vomiting blood   Severe abdominal pain   Severe chest pain   Black, tarry stools   Bleeding- more than one tablespoon   Any other symptom or condition that you feel may need urgent attention  Your doctor recommends these additional instructions:  If any biopsies were taken, your doctors clinic will contact you in 1 to 2   weeks with any results.  - Return patient to hospital [facility] for ongoing care.   - Advance diet as tolerated today.   - Consider ultrasound abdomen with doppler of celiac artery and SMA.  - Colonoscopy outpatient at next available.  - Use a proton pump inhibitor PO daily.   - Await pathology results.   - The findings and recommendations were discussed with the designated   responsible adult.  For questions, problems or results please call your physician - Ravi Petit MD at Work:  [phone].  [facility], EMERGENCY ROOM PHONE NUMBER: [phone]  IF A COMPLICATION OR EMERGENCY SITUATION ARISES AND YOU ARE UNABLE TO REACH   YOUR PHYSICIAN - GO DIRECTLY TO THE EMERGENCY ROOM.  MD Ravi Rosales MD  4/9/2024 12:55:39 PM  This report has been verified and signed electronically.  Dear patient,  As a result of recent federal legislation (The Federal Cures Act), you may   receive lab or pathology results from your procedure in your [facility]   account before your physician is able to contact you. Your physician or   their representative will relay the results to you with their   recommendations at their soonest availability.  Thank you,  PROVATION

## 2024-04-09 NOTE — PROGRESS NOTES
"   Acute Care Surgery   Progress Note  Admit Date: 4/8/2024  HD#1  POD#Day of Surgery    Subjective:   Interval history:  NAEON, AF, VSS  HIDA negative for cholecystitis  Reports continued abdominal pain in lower abdomen but improved  Denies N/V  Having BM    Home Meds:  Current Outpatient Medications   Medication Instructions    indomethacin (INDOCIN) 25 mg, Oral, 2 times daily PRN    lisinopriL (PRINIVIL,ZESTRIL) 40 mg, Oral, Daily    metFORMIN (GLUCOPHAGE) 1,000 mg, Oral, 2 times daily with meals    pantoprazole (PROTONIX) 20 mg, Oral, Daily    traMADoL (ULTRAM) 50 mg, Oral, Every 12 hours PRN      Scheduled Meds:   enoxparin  40 mg Subcutaneous Q24H (prophylaxis, 1700)    lisinopriL  40 mg Oral Daily    nicotine  1 patch Transdermal Daily    pantoprazole  40 mg Intravenous Q12H    sodium,potassium,mag sulfates  1 Bottle Oral BID    sucralfate  1 g Oral QID     Continuous Infusions:   lactated ringers 100 mL/hr at 04/09/24 1334     PRN Meds:acetaminophen, aluminum-magnesium hydroxide-simethicone, dextrose 10%, dextrose 10%, glucagon (human recombinant), hydrALAZINE, insulin aspart U-100, labetalol, melatonin, ondansetron, polyethylene glycol, prochlorperazine, senna-docusate 8.6-50 mg, sodium chloride 0.9%     Objective:     VITAL SIGNS: 24 HR MIN & MAX LAST   Temp  Min: 97.3 °F (36.3 °C)  Max: 98.2 °F (36.8 °C)  98.2 °F (36.8 °C)   BP  Min: 117/74  Max: 177/98  (!) 148/84    Pulse  Min: 73  Max: 81  73    Resp  Min: 18  Max: 29  18    SpO2  Min: 97 %  Max: 100 %  99 %      HT: 5' 9" (175.3 cm)  WT: 52.2 kg (115 lb)  BMI: 17     Intake/output:  Intake/Output - Last 3 Shifts         04/07 0700  04/08 0659 04/08 0700 04/09 0659 04/09 0700  04/10 0659    I.V. (mL/kg)   400 (7.7)    Total Intake(mL/kg)   400 (7.7)    Net   +400           Urine Occurrence   1 x    Stool Occurrence   0 x            Intake/Output Summary (Last 24 hours) at 4/9/2024 1605  Last data filed at 4/9/2024 1251  Gross per 24 hour   Intake 400 " "ml   Output --   Net 400 ml           Lines/drains/airway:       Peripheral IV - Single Lumen 04/08/24 2129 22 G Left;Posterior Forearm (Active)   Site Assessment Clean;Dry;Intact 04/09/24 0850   Extremity Assessment Distal to IV No abnormal discoloration 04/09/24 0850   Line Status Infusing 04/09/24 0850   Dressing Status Clean;Dry;Intact 04/09/24 0850   Dressing Intervention Integrity maintained 04/09/24 0850   Number of days: 0       Physical Examination:  General: Thin male, no acute distress  HEENT: Normocephalic, atraumatic, PERRL  CV: RR  Resp: NWOB  GI:  Abdomen soft, tenderness to lower quadrants and RUQ, maximal tenderness to RLQ  :  Deferred  MSK: moving all extremities spontaneously  Skin/wounds:  No rashes, ulcers, erythema    Labs:  Renal:  Recent Labs     04/08/24  1448 04/09/24  0512   BUN 19.4 16.8   CREATININE 0.94 0.84     No results for input(s): "LACTIC" in the last 72 hours.  FENGI:  Recent Labs     04/08/24  1448 04/09/24  0512    138   K 4.3 4.5   CO2 26 23   CALCIUM 9.8 9.7   MG 2.00 1.80   PHOS  --  3.9   ALBUMIN 3.9 3.6   BILITOT 0.7 1.0   * 62*   ALKPHOS 98 84   ALT 89* 80*     Heme:  Recent Labs     04/08/24  1448 04/09/24  0512   HGB 19.7* 18.9*   HCT 56.7* 55.5*    212     ID:  Recent Labs     04/08/24  1448 04/09/24  0512   WBC 7.69 6.91     CBG:  Recent Labs     04/08/24  1448 04/09/24  0512   GLUCOSE 224* 134*      Cardiovascular:  No results for input(s): "TROPONINI", "CKTOTAL", "CKMB", "BNP" in the last 168 hours.  ABG:  No results for input(s): "PH", "PO2", "PCO2", "HCO3", "BE" in the last 168 hours.   I have reviewed all pertinent lab results within the past 24 hours.    Imaging:  US Mesenteric Ischemia Study (xpd)   Final Result      Mildly elevated velocity at the celiac artery, of uncertain clinical significance.  There was mild calcific atherosclerosis at the origin of the celiac on CT exam without significant stenosis.         Electronically signed " by: Yovana Landry   Date:    04/09/2024   Time:    15:44      NM Hepatobiliary Scan (HIDA)   Final Result      Negative for acute cholecystitis.         Electronically signed by: Pedro Garcia   Date:    04/09/2024   Time:    10:50      CT Abdomen Pelvis With IV Contrast NO Oral Contrast   Final Result      Edema and hyperemia of the distal stomach.  Gastritis is suspected.         Electronically signed by: Tai Valiente   Date:    04/08/2024   Time:    21:43      US Abdomen Limited   Final Result      Cholelithiasis with positive sonographic Jurado sign, but no gallbladder wall thickening or clear ascites.  Appearance is equivocal for early cholecystitis.  No biliary dilatation.         Electronically signed by: Ottoniel Michel   Date:    04/08/2024   Time:    16:23         I have reviewed all pertinent imaging results/findings within the past 24 hours.    Micro/Path/Other:  Microbiology Results (last 7 days)       ** No results found for the last 168 hours. **           Pathology Results  (Last 7 days)      None             Assessment & Plan:   68 y M w/ history of tobacco abuse, poorly controlled DM, HTN, Hepatosteatosis, and cholelithiasis presenting w chronic abdominal pain that has occurred for several months, more recently associated with watery diarrhea with each meal and significant unintentional weight loss. Lab workup reveals normal WBC, T bili, ALP, mild LFT elevation above baseline. US equivocal for early cholecystitis with no wall thickening or pericholecystic fluid.     - No acute surgical intervention indicated  - HIDA negative for cholecystitis  - Recommend continued GI workup given persistent abdominal pain  - GI planning for endoscopy, will follow up results  - Rest of care per primary team    Doreen Lantigua MD  [facility] PGY-1

## 2024-04-09 NOTE — NURSING
Nurses Note -- 4 Eyes      4/9/2024   9:39 AM      Skin assessed during: Admit      [x] No Altered Skin Integrity Present    []Prevention Measures Documented      [] Yes- Altered Skin Integrity Present or Discovered   [] LDA Added if Not in Epic (Describe Wound)   [] New Altered Skin Integrity was Present on Admit and Documented in LDA   [] Wound Image Taken    Wound Care Consulted? No    Attending Nurse:  Jo-Ann Flores RN/Staff Member:   TIM Lott

## 2024-04-09 NOTE — H&P
[facility] Hospital Medicine - H&P Note    Patient Name: Heath Holder  : 1955  MRN: [id number]  PCP: Marilu Amezquita FNP  Admitting Physician: Cayla Artis MD  Admission Class: IP- Inpatient   Length of Stay: 0  Face-to-Face encounter date: 2024  Code status: --Full    Chief Complaint   Abdominal pain    History of Present Illness   Mr. Holder is a 68 year old male with a pmh of HTN, DM2, and chronic back pain who presented to the ED with c/o RUQ abdominal pain x 1 month, getting progressively worse.  He also states that he has been having diarrhea, especially after eating.  Denies fever, chills, chest pain, vomiting.  He states that he was seen at another ER and told that he had gallstones and to follow up with surgery.    ED vitals on arrival:  Temp 98.3° F, pulse 92, resp 18, /98, SpO2 97% on RA.  Today's ED lab work revealed H&H 19.7/56.7, glucose 224, , ALT 89.  CT abdomen pelvis with IV contrast showed edema and hyperemia of the distal stomach.  Gastritis is suspected.  Abdominal ultrasound showed cholelithiasis with positive sonographic Jurado's sign, but no gallbladder wall thickening or clear ascites.  Appearance is equivocal for early cholecystitis.  No biliary dilatation.  Surgery was consulted in the ED.  He was admitted to Hospital Medicine for management.      ROS   Except as documented, all other systems reviewed and negative     Past Medical History     Hypertension  Diabetes mellitus Type II  Chronic back pain    Past Surgical History     Past Surgical History:   Procedure Laterality Date    HIP SURGERY Left     ORIF HIP FRACTURE Right     >10 years       Social History     Social History     Tobacco Use    Smoking status: Every Day     Current packs/day: 0.50     Average packs/day: 0.5 packs/day for 52.3 years (26.1 ttl pk-yrs)     Types: Cigarettes     Start date: 1972    Smokeless tobacco: Never   Substance Use Topics    Alcohol  "use: Not Currently        Family History   Reviewed and negative    Allergies   Patient has no known allergies.    Home Medications     Prior to Admission medications    Medication Sig Start Date End Date Taking? Authorizing Provider   indomethacin (INDOCIN) 25 MG capsule Take 1 capsule (25 mg total) by mouth 2 (two) times daily as needed (pain). 3/30/24  Yes Justin Rios Jr., ADANP   lisinopriL (PRINIVIL,ZESTRIL) 40 MG tablet Take 1 tablet (40 mg total) by mouth once daily. 2/15/24 2/14/25 Yes Marilu Amezquita FNP   metFORMIN (GLUCOPHAGE) 1000 MG tablet Take 1 tablet (1,000 mg total) by mouth 2 (two) times daily with meals. 3/18/24  Yes Marilu Amezquita FNP   pantoprazole (PROTONIX) 20 MG tablet Take 1 tablet (20 mg total) by mouth once daily. 3/18/24 3/18/25 Yes Marilu Amezquita FNP   traMADoL (ULTRAM) 50 mg tablet Take 1 tablet (50 mg total) by mouth every 12 (twelve) hours as needed for Pain. 3/30/24   Justin Rios Jr., ADANP        Physical Exam   Vital Signs  Temp:  [98.3 °F (36.8 °C)]   Pulse:  [78-92]   Resp:  [18]   BP: (155-181)/()   SpO2:  [97 %-99 %]    General: Well developed, well nourished. In no acute distress, non-toxic appearing  HEENT: NC/AT  Neck:  Supple. No JVD  Chest: Clear bilaterally, no wheezing, no accessory muscle use.  CVS: Regular rhythm. Normal S1/S2.  Abdomen: nondistended, normoactive BS, soft and non-tender.  MSK: No obvious deformity or joint swelling  Skin: Warm and dry  Neuro: AAOx3, no focal neurological deficit  Psych: Cooperative      Labs     Recent Labs     04/08/24  1448   WBC 7.69   RBC 6.45*   HGB 19.7*   HCT 56.7*   MCV 87.9   MCH 30.5   MCHC 34.7   RDW 11.6        No results for input(s): "PROTIME", "INR", "PTT", "D-DIMER", "FERRITIN", "IRON", "TRANS", "TIBC", "LABIRON", "[id number]", "FOLATE", "LDH", "HAPTOGLOBIN", "RETICCNTAUTO", "RETABS", "PERIPSMEAREV" in the last 72 hours.   Recent Labs     04/08/24  1448      K 4.3   CHLORIDE 104 " "  CO2 26   BUN 19.4   CREATININE 0.94   EGFRNORACEVR >60   GLUCOSE 224*   CALCIUM 9.8   MG 2.00   ALBUMIN 3.9   GLOBULIN 3.9*   ALKPHOS 98   ALT 89*   *   BILITOT 0.7   LIPASE 16     No results for input(s): "LACTIC" in the last 72 hours.  No results for input(s): "CPK", "TROPONINI" in the last 72 hours.     Microbiology Results (last 7 days)       ** No results found for the last 168 hours. **           Imaging     CT Abdomen Pelvis With IV Contrast NO Oral Contrast   Final Result      Edema and hyperemia of the distal stomach.  Gastritis is suspected.         Electronically signed by: Tai Valiente   Date:    04/08/2024   Time:    21:43      US Abdomen Limited   Final Result      Cholelithiasis with positive sonographic Jurado sign, but no gallbladder wall thickening or clear ascites.  Appearance is equivocal for early cholecystitis.  No biliary dilatation.         Electronically signed by: Ottoniel Michel   Date:    04/08/2024   Time:    16:23      NM Hepatobiliary Scan (HIDA)    (Results Pending)     Assessment & Plan   Acute cholecystitis  Accelerated blood pressure    History:  HTN, DM2, and chronic back pain    Plan:  -Surgery consulted in ED  -HIDA scan pending  -LR @ 100 ml/hr  -NPO  -Sliding scale insulin with accu-checks  -Antihypertensives as needed  -Resume home meds as appropriate  -Labs in AM       VTE Prophylaxis: Mary Jane Sanderson Leslie Dupuis, LILI have reviewed and discussed this case with Dr. Artis.  Please see addendum for further assessment and plan from attending MD.      "

## 2024-04-09 NOTE — ANESTHESIA POSTPROCEDURE EVALUATION
Anesthesia Post Evaluation    Patient: Heath Holder    Procedure(s) Performed: Procedure(s) (LRB):  EGD (N/A)  EGD, WITH CLOSED BIOPSY    Final Anesthesia Type: general      Patient location during evaluation: GI PACU  Patient participation: Yes- Able to Participate  Level of consciousness: awake and alert  Post-procedure vital signs: reviewed and stable  Pain management: adequate  Airway patency: patent    PONV status at discharge: No PONV  Anesthetic complications: no      Respiratory status: unassisted  Hydration status: euvolemic  Follow-up not needed.              Vitals Value Taken Time   /74 04/09/24 1300   Temp 36.3 °C (97.3 °F) 04/09/24 1245   Pulse 73 04/09/24 1300   Resp 29 04/09/24 1300   SpO2 100 % 04/09/24 1300         No case tracking events are documented in the log.      Pain/Marci Score: Marci Score: 9 (4/9/2024 12:50 PM)

## 2024-04-09 NOTE — NURSING
Pt transferred to xray for Hida Scan per pt's bed in stable condition. No distress noted. Wife at bedside.

## 2024-04-09 NOTE — PROGRESS NOTES
[facility]  Hospital Medicine Progress Note        Chief Complaint: Inpatient Follow-up     HPI:   68 year old male with a pmh of HTN, DM2, and chronic back pain who presented to the ED with c/o RUQ abdominal pain x 1 month, getting progressively worse.  He also states that he has been having diarrhea, especially after eating.  Denies fever, chills, chest pain, vomiting.  He states that he was seen at another ER and told that he had gallstones and to follow up with surgery.     ED vitals on arrival:  Temp 98.3° F, pulse 92, resp 18, /98, SpO2 97% on RA.  Today's ED lab work revealed H&H 19.7/56.7, glucose 224, , ALT 89.  CT abdomen pelvis with IV contrast showed edema and hyperemia of the distal stomach.  Gastritis is suspected.  Abdominal ultrasound showed cholelithiasis with positive sonographic Jurado's sign, but no gallbladder wall thickening or clear ascites.  Appearance is equivocal for early cholecystitis.  No biliary dilatation.  Surgery was consulted in the ED.  He was admitted to Hospital Medicine for management.       Interval Hx:  Assumed care from night service.  No new changes overnight.  Remains boarding in the ER.  General surgery evaluated.  Recommending medical management for now.    Objective/physical exam:  General: In no acute distress, afebrile  Chest: Clear to auscultation bilaterally  Heart: RRR, +S1, S2, no appreciable murmur  Abdomen: Soft, nontender, BS +  MSK: Warm, no lower extremity edema, no clubbing or cyanosis  Neurologic: Alert and oriented x4, Cranial nerve II-XII intact, Strength 5/5 in all 4 extremities    VITAL SIGNS: 24 HRS MIN & MAX LAST   Temp  Min: 98.3 °F (36.8 °C)  Max: 98.3 °F (36.8 °C) 98.3 °F (36.8 °C)   BP  Min: 155/99  Max: 181/98 (!) 177/98   Pulse  Min: 78  Max: 92  80   Resp  Min: 18  Max: 18 18   SpO2  Min: 97 %  Max: 99 % 99 %       Recent Labs   Lab 04/08/24  1448 04/09/24  0512   WBC 7.69 6.91   RBC 6.45* 6.21*   HGB  19.7* 18.9*   HCT 56.7* 55.5*   MCV 87.9 89.4   MCH 30.5 30.4   MCHC 34.7 34.1   RDW 11.6 11.5    212   MPV 10.5* 10.4       Recent Labs   Lab 04/08/24  1448 04/09/24  0512    138   K 4.3 4.5   CO2 26 23   BUN 19.4 16.8   CREATININE 0.94 0.84   CALCIUM 9.8 9.7   MG 2.00 1.80   ALBUMIN 3.9 3.6   ALKPHOS 98 84   ALT 89* 80*   * 62*   BILITOT 0.7 1.0          Microbiology Results (last 7 days)       ** No results found for the last 168 hours. **             Radiology:  CT Abdomen Pelvis With IV Contrast NO Oral Contrast  Narrative: EXAMINATION:  CT ABDOMEN PELVIS WITH IV CONTRAST    CLINICAL HISTORY:  Epigastric pain;Nausea/vomiting;    TECHNIQUE:  Multidetector IV contrast enhanced axial CT images of the abdomen and pelvis were obtained with coronal and sagittal reconstructions.    Automatic exposure control was utilized to reduce the patient's radiation dose.    DLP= 320    COMPARISON:  03/30/2024    FINDINGS:  01. HEPATOBILIARY: No focal hepatic lesion is identified, stones scattered throughout the gallbladder.    02. SPLEEN: Normal    03. PANCREAS: No focal masses or ductal dilatation.    04. ADRENALS: No adrenal nodules.    05. KIDNEYS: The right kidney demonstrates no stone, hydronephrosis, or hydroureter. No focal mass identified. The left kidney demonstrates no stone, hydronephrosis, or hydroureter. No focal mass identified.    06. LYMPHADENOPATHY/RETROPERITONEUM: There is no retroperitoneal lymphadenopathy. The abdominal aorta is normal in course and caliber. There are diffuse scattered mural atheromatous calcifications in the aortoiliac system.    07. BOWEL: Edema and hyperemia of the distal stomach.  No evidence of appendiceal inflammation.    08. PELVIC VISCERA: Normal. No pelvic mass.    09. PELVIC LYMPH NODES: No lymphadenopathy.    10. PERITONEUM/ABDOMINAL WALL: No ascites or implant.    11. SKELETAL: No aggressive appearing lytic/blastic lesion. No acute fractures, subluxations or  dislocations.    12. LUNG BASES: The visualized lungs are unremarkable.  Impression: Edema and hyperemia of the distal stomach.  Gastritis is suspected.    Electronically signed by: Tai Valiente  Date:    04/08/2024  Time:    21:43  US Abdomen Limited  Narrative: EXAMINATION:  US ABDOMEN LIMITED    CLINICAL HISTORY:  RUQ pain;    TECHNIQUE:  Gray-scale and color Doppler ultrasound images of the abdomen.    COMPARISON:  Ultrasound 03/27/2024    FINDINGS:  Normal pancreas.  Normal caliber of the superior IVC.    Liver has normal size and echogenicity.  Main portal vein patent with normal flow direction.  Normal CBD caliber.  Multiple shadowing gallstones.  No gallbladder wall thickening or significant ascites.  Positive sonographic Jurdao sign.    No hydronephrosis or defined calcification in the right kidney.    Measurements:    - Liver: 15.6 cm    - CBD diameter: 3 mm    - Right kidney: 10.6 cm in length  Impression: Cholelithiasis with positive sonographic Jurado sign, but no gallbladder wall thickening or clear ascites.  Appearance is equivocal for early cholecystitis.  No biliary dilatation.    Electronically signed by: Ottoniel Michel  Date:    04/08/2024  Time:    16:23      Scheduled Med:   enoxparin  40 mg Subcutaneous Q24H (prophylaxis, 1700)    lisinopriL  40 mg Oral Daily    nicotine  1 patch Transdermal Daily    pantoprazole  40 mg Intravenous Q12H    sucralfate  1 g Oral QID        Continuous Infusions:   lactated ringers 100 mL/hr at 04/08/24 2155        PRN Meds:  acetaminophen, aluminum-magnesium hydroxide-simethicone, dextrose 10%, dextrose 10%, glucagon (human recombinant), insulin aspart U-100, melatonin, ondansetron, polyethylene glycol, prochlorperazine, senna-docusate 8.6-50 mg, sodium chloride 0.9%       Assessment/Plan:   Acute cholecystitis  Accelerated blood pressure     History:  HTN, DM2, and chronic back pain    HIDA scan ordered and pending.  General surgery is following.  Recommending  continue NPO status for now.    Pain is well controlled.  Continue with IV fluids while NPO.  Okay to give home oral antihypertensives.    Sliding scale insulin available while NPO.  Hold oral antihyperglycemics  Prn iv antihypertensive availble    Critical care diagnosis: hypertensive urgency requiring iv antihypertensives  Critical care interventions: hands on evaluation, review of labs/radiographs/records and discussions with family  Critical care time spent: >32 minutes    12 minutes spent on counseling on tobacco cessation.  Counseled on harm and risks associated with smoking including stroke, heart disease, lung disease.  Discussed cessation resources and treatment including nicotine patch.      Zain Abernathy MD   04/09/2024     All diagnosis and differential diagnosis have been reviewed; assessment and plan has been documented; I have personally reviewed the labs and test results that are presently available; I have reviewed the patients medication list; I have reviewed the consulting providers response and recommendations. I have reviewed or attempted to review medical records based upon their availability    All of the patient's questions have been  addressed and answered. Patient's is agreeable to the above stated plan. I will continue to monitor closely and make adjustments to medical management as needed.  _____________________________________________________________________

## 2024-04-09 NOTE — TRANSFER OF CARE
"Anesthesia Transfer of Care Note    Patient: Heath Holder    Procedure(s) Performed: Procedure(s) (LRB):  EGD (N/A)  EGD, WITH CLOSED BIOPSY    Patient location: PACU    Anesthesia Type: general    Transport from OR: Transported from OR on room air with adequate spontaneous ventilation    Post pain: adequate analgesia    Post assessment: no apparent anesthetic complications    Post vital signs: stable    Level of consciousness: awake and sedated    Nausea/Vomiting: no nausea/vomiting    Complications: none    Transfer of care protocol was followed      Last vitals: Visit Vitals  BP (!) 141/86 (BP Location: Right arm, Patient Position: Lying)   Pulse 80   Temp 36.7 °C (98.1 °F) (Skin)   Resp (!) 26   Ht 5' 9" (1.753 m)   Wt 52.2 kg (115 lb)   SpO2 99%   BMI 16.98 kg/m²     "

## 2024-04-10 ENCOUNTER — ANESTHESIA EVENT (OUTPATIENT)
Dept: ENDOSCOPY | Facility: HOSPITAL | Age: 69
DRG: 444 | End: 2024-04-10
Payer: MEDICARE

## 2024-04-10 ENCOUNTER — ANESTHESIA (OUTPATIENT)
Dept: ENDOSCOPY | Facility: HOSPITAL | Age: 69
DRG: 444 | End: 2024-04-10
Payer: MEDICARE

## 2024-04-10 PROBLEM — E43 SEVERE MALNUTRITION: Status: ACTIVE | Noted: 2024-04-10

## 2024-04-10 LAB
ALBUMIN SERPL-MCNC: 3.5 G/DL (ref 3.4–4.8)
ALBUMIN/GLOB SERPL: 1.3 RATIO (ref 1.1–2)
ALP SERPL-CCNC: 77 UNIT/L (ref 40–150)
ALT SERPL-CCNC: 64 UNIT/L (ref 0–55)
AST SERPL-CCNC: 43 UNIT/L (ref 5–34)
BASOPHILS # BLD AUTO: 0.03 X10(3)/MCL
BASOPHILS NFR BLD AUTO: 0.5 %
BILIRUB SERPL-MCNC: 1 MG/DL
BUN SERPL-MCNC: 16.6 MG/DL (ref 8.4–25.7)
CALCIUM SERPL-MCNC: 8.9 MG/DL (ref 8.8–10)
CHLORIDE SERPL-SCNC: 103 MMOL/L (ref 98–107)
CO2 SERPL-SCNC: 26 MMOL/L (ref 23–31)
CREAT SERPL-MCNC: 0.88 MG/DL (ref 0.73–1.18)
EOSINOPHIL # BLD AUTO: 0.02 X10(3)/MCL (ref 0–0.9)
EOSINOPHIL NFR BLD AUTO: 0.3 %
ERYTHROCYTE [DISTWIDTH] IN BLOOD BY AUTOMATED COUNT: 11.5 % (ref 11.5–17)
GFR SERPLBLD CREATININE-BSD FMLA CKD-EPI: >60 MLS/MIN/1.73/M2
GLOBULIN SER-MCNC: 2.7 GM/DL (ref 2.4–3.5)
GLUCOSE SERPL-MCNC: 130 MG/DL (ref 70–110)
GLUCOSE SERPL-MCNC: 226 MG/DL (ref 82–115)
HCT VFR BLD AUTO: 51.9 % (ref 42–52)
HGB BLD-MCNC: 17.6 G/DL (ref 14–18)
IMM GRANULOCYTES # BLD AUTO: 0.01 X10(3)/MCL (ref 0–0.04)
IMM GRANULOCYTES NFR BLD AUTO: 0.2 %
LYMPHOCYTES # BLD AUTO: 2.51 X10(3)/MCL (ref 0.6–4.6)
LYMPHOCYTES NFR BLD AUTO: 42.1 %
MAYO GENERIC ORDERABLE RESULT: ABNORMAL
MCH RBC QN AUTO: 30.5 PG (ref 27–31)
MCHC RBC AUTO-ENTMCNC: 33.9 G/DL (ref 33–36)
MCV RBC AUTO: 89.9 FL (ref 80–94)
MONOCYTES # BLD AUTO: 0.63 X10(3)/MCL (ref 0.1–1.3)
MONOCYTES NFR BLD AUTO: 10.6 %
NEUTROPHILS # BLD AUTO: 2.76 X10(3)/MCL (ref 2.1–9.2)
NEUTROPHILS NFR BLD AUTO: 46.3 %
NRBC BLD AUTO-RTO: 0 %
PLATELET # BLD AUTO: 184 X10(3)/MCL (ref 130–400)
PMV BLD AUTO: 10.3 FL (ref 7.4–10.4)
POCT GLUCOSE: 123 MG/DL (ref 70–110)
POCT GLUCOSE: 149 MG/DL (ref 70–110)
POCT GLUCOSE: 154 MG/DL (ref 70–110)
POCT GLUCOSE: 221 MG/DL (ref 70–110)
POTASSIUM SERPL-SCNC: 4.4 MMOL/L (ref 3.5–5.1)
PROT SERPL-MCNC: 6.2 GM/DL (ref 5.8–7.6)
PSYCHE PATHOLOGY RESULT: NORMAL
RBC # BLD AUTO: 5.77 X10(6)/MCL (ref 4.7–6.1)
SODIUM SERPL-SCNC: 137 MMOL/L (ref 136–145)
WBC # SPEC AUTO: 5.96 X10(3)/MCL (ref 4.5–11.5)

## 2024-04-10 PROCEDURE — 63600175 PHARM REV CODE 636 W HCPCS: Performed by: HOSPITALIST

## 2024-04-10 PROCEDURE — 25000003 PHARM REV CODE 250: Performed by: PHYSICIAN ASSISTANT

## 2024-04-10 PROCEDURE — 25000003 PHARM REV CODE 250: Performed by: HOSPITALIST

## 2024-04-10 PROCEDURE — 25000003 PHARM REV CODE 250: Performed by: NURSE ANESTHETIST, CERTIFIED REGISTERED

## 2024-04-10 PROCEDURE — 37000009 HC ANESTHESIA EA ADD 15 MINS: Performed by: INTERNAL MEDICINE

## 2024-04-10 PROCEDURE — S4991 NICOTINE PATCH NONLEGEND: HCPCS | Performed by: NURSE PRACTITIONER

## 2024-04-10 PROCEDURE — 37000008 HC ANESTHESIA 1ST 15 MINUTES: Performed by: INTERNAL MEDICINE

## 2024-04-10 PROCEDURE — 85025 COMPLETE CBC W/AUTO DIFF WBC: CPT | Performed by: HOSPITALIST

## 2024-04-10 PROCEDURE — 63600175 PHARM REV CODE 636 W HCPCS: Performed by: INTERNAL MEDICINE

## 2024-04-10 PROCEDURE — 25000003 PHARM REV CODE 250: Performed by: NURSE PRACTITIONER

## 2024-04-10 PROCEDURE — D9220A PRA ANESTHESIA: Mod: ANES,,, | Performed by: ANESTHESIOLOGY

## 2024-04-10 PROCEDURE — 0DJD8ZZ INSPECTION OF LOWER INTESTINAL TRACT, VIA NATURAL OR ARTIFICIAL OPENING ENDOSCOPIC: ICD-10-PCS | Performed by: INTERNAL MEDICINE

## 2024-04-10 PROCEDURE — 80053 COMPREHEN METABOLIC PANEL: CPT | Performed by: HOSPITALIST

## 2024-04-10 PROCEDURE — 45378 DIAGNOSTIC COLONOSCOPY: CPT | Performed by: INTERNAL MEDICINE

## 2024-04-10 PROCEDURE — C9113 INJ PANTOPRAZOLE SODIUM, VIA: HCPCS | Performed by: INTERNAL MEDICINE

## 2024-04-10 PROCEDURE — 25000003 PHARM REV CODE 250: Performed by: INTERNAL MEDICINE

## 2024-04-10 PROCEDURE — 11000001 HC ACUTE MED/SURG PRIVATE ROOM

## 2024-04-10 PROCEDURE — D9220A PRA ANESTHESIA: Mod: CRNA,,, | Performed by: NURSE ANESTHETIST, CERTIFIED REGISTERED

## 2024-04-10 RX ORDER — AMLODIPINE BESYLATE 5 MG/1
10 TABLET ORAL DAILY
Status: DISCONTINUED | OUTPATIENT
Start: 2024-04-10 | End: 2024-04-12 | Stop reason: HOSPADM

## 2024-04-10 RX ORDER — PROPOFOL 10 MG/ML
VIAL (ML) INTRAVENOUS
Status: DISPENSED
Start: 2024-04-10 | End: 2024-04-10

## 2024-04-10 RX ORDER — PHENYLEPHRINE HCL IN 0.9% NACL 1 MG/10 ML
SYRINGE (ML) INTRAVENOUS
Status: DISPENSED
Start: 2024-04-10 | End: 2024-04-10

## 2024-04-10 RX ORDER — LIDOCAINE HYDROCHLORIDE 20 MG/ML
INJECTION INTRAVENOUS
Status: DISCONTINUED | OUTPATIENT
Start: 2024-04-10 | End: 2024-04-10

## 2024-04-10 RX ORDER — PHENYLEPHRINE HCL IN 0.9% NACL 1 MG/10 ML
SYRINGE (ML) INTRAVENOUS
Status: DISCONTINUED | OUTPATIENT
Start: 2024-04-10 | End: 2024-04-10

## 2024-04-10 RX ORDER — PROPOFOL 10 MG/ML
VIAL (ML) INTRAVENOUS CONTINUOUS PRN
Status: DISCONTINUED | OUTPATIENT
Start: 2024-04-10 | End: 2024-04-10

## 2024-04-10 RX ADMIN — PANTOPRAZOLE SODIUM 40 MG: 40 INJECTION, POWDER, FOR SOLUTION INTRAVENOUS at 07:04

## 2024-04-10 RX ADMIN — SUCRALFATE 1 G: 1 TABLET ORAL at 04:04

## 2024-04-10 RX ADMIN — Medication 70 MG: at 09:04

## 2024-04-10 RX ADMIN — HYDRALAZINE HYDROCHLORIDE 10 MG: 20 INJECTION INTRAMUSCULAR; INTRAVENOUS at 07:04

## 2024-04-10 RX ADMIN — AMLODIPINE BESYLATE 10 MG: 5 TABLET ORAL at 12:04

## 2024-04-10 RX ADMIN — SODIUM CHLORIDE, SODIUM GLUCONATE, SODIUM ACETATE, POTASSIUM CHLORIDE AND MAGNESIUM CHLORIDE: 526; 502; 368; 37; 30 INJECTION, SOLUTION INTRAVENOUS at 09:04

## 2024-04-10 RX ADMIN — LISINOPRIL 40 MG: 20 TABLET ORAL at 07:04

## 2024-04-10 RX ADMIN — Medication 100 MCG: at 09:04

## 2024-04-10 RX ADMIN — Medication 150 MCG/KG/MIN: at 09:04

## 2024-04-10 RX ADMIN — NICOTINE 1 PATCH: 21 PATCH, EXTENDED RELEASE TRANSDERMAL at 07:04

## 2024-04-10 RX ADMIN — PANTOPRAZOLE SODIUM 40 MG: 40 INJECTION, POWDER, FOR SOLUTION INTRAVENOUS at 08:04

## 2024-04-10 RX ADMIN — LIDOCAINE HYDROCHLORIDE 60 MG: 20 INJECTION INTRAVENOUS at 09:04

## 2024-04-10 RX ADMIN — SUCRALFATE 1 G: 1 TABLET ORAL at 08:04

## 2024-04-10 RX ADMIN — SUCRALFATE 1 G: 1 TABLET ORAL at 12:04

## 2024-04-10 RX ADMIN — SODIUM SULFATE, POTASSIUM SULFATE, MAGNESIUM SULFATE 177 ML: 17.5; 3.13; 1.6 SOLUTION, CONCENTRATE ORAL at 05:04

## 2024-04-10 NOTE — PROGRESS NOTES
Inpatient Nutrition Assessment    Admit Date: 4/8/2024   Total duration of encounter: 2 days   Patient Age: 68 y.o.    Nutrition Recommendation/Prescription     Continue Diabetic diet as tolerated.   Add Boost Max (provides 160 kcal, 30 g protein per serving) BID for additional nutrition.   Medical management of diarrhea as feasible.     Communication of Recommendations: reviewed with nurse, reviewed with patient, and reviewed with family    Nutrition Assessment     Malnutrition Assessment/Nutrition-Focused Physical Exam    Malnutrition Context: acute illness or injury (04/10/24 [facility]2)  Malnutrition Level: severe (04/10/24 1352)  Energy Intake (Malnutrition):  (does not meet criteria) (04/10/24 1352)  Weight Loss (Malnutrition): greater than 5% in 1 month (04/10/24 1352)  Subcutaneous Fat (Malnutrition): moderate depletion (04/10/24 1352)  Orbital Region (Subcutaneous Fat Loss): mild depletion  Upper Arm Region (Subcutaneous Fat Loss): moderate depletion     Muscle Mass (Malnutrition): moderate depletion (04/10/24 1352)  Ralph Region (Muscle Loss): mild depletion  Clavicle Bone Region (Muscle Loss): moderate depletion  Clavicle and Acromion Bone Region (Muscle Loss): mild depletion     Dorsal Hand (Muscle Loss): mild depletion           Fluid Accumulation (Malnutrition):  (does not meet criteria) (04/10/24 1352)        A minimum of two characteristics is recommended for diagnosis of either severe or non-severe malnutrition.    Chart Review    Reason Seen: malnutrition screening tool (MST)    Malnutrition Screening Tool Results   Have you recently lost weight without trying?: Yes: 14-23 lbs  Have you been eating poorly because of a decreased appetite?: Yes   MST Score: 3   Diagnosis:  Abdominal pain  Accelerated blood pressure    Relevant Medical History: HTN, DM2, and chronic back pain     Scheduled Medications:  amLODIPine, 10 mg, Daily  enoxparin, 40 mg, Q24H (prophylaxis, 1700)  lisinopriL, 40 mg,  "Daily  nicotine, 1 patch, Daily  pantoprazole, 40 mg, Q12H  phenylephrine HCl in 0.9% NaCl, ,   propofol, ,   sucralfate, 1 g, QID    Continuous Infusions:  lactated ringers, Last Rate: 100 mL/hr at 04/09/24 1334    PRN Medications: acetaminophen, aluminum-magnesium hydroxide-simethicone, dextrose 10%, dextrose 10%, glucagon (human recombinant), hydrALAZINE, insulin aspart U-100, labetalol, melatonin, ondansetron, phenylephrine HCl in 0.9% NaCl, polyethylene glycol, prochlorperazine, propofol, senna-docusate 8.6-50 mg, sodium chloride 0.9%    Calorie Containing IV Medications: no significant kcals from medications at this time    Recent Labs   Lab 04/08/24  1448 04/09/24  0512    138   K 4.3 4.5   CALCIUM 9.8 9.7   PHOS  --  3.9   MG 2.00 1.80   CHLORIDE 104 106   CO2 26 23   BUN 19.4 16.8   CREATININE 0.94 0.84   EGFRNORACEVR >60 >60   GLUCOSE 224* 134*   BILITOT 0.7 1.0   ALKPHOS 98 84   ALT 89* 80*   * 62*   ALBUMIN 3.9 3.6   LIPASE 16 27   WBC 7.69 6.91   HGB 19.7* 18.9*   HCT 56.7* 55.5*     Nutrition Orders:  Diet diabetic 2000 Calorie  Dietary nutrition supplements Boost Glucose Control Max 30G Protein Nutritional Drink - Vanilla; BID    Appetite/Oral Intake: good/50-75% of meals  Factors Affecting Nutritional Intake: altered gastrointestinal function and diarrhea  Food/Adventist/Cultural Preferences: none reported  Food Allergies: none reported  Last Bowel Movement: 04/10/24  Wound(s):      Comments    4/10/24 Fair-good po intake since admit. Reported unintentional wt loss over the last 1-2 months. Denies significant changes in appetite/po intake but "everything goes through me" having >7 liquid bowel movements daily. Muscle and fat loss noted on physical assessment. Denies any current GI complaints, states frequency of bowel movement have improved. Willing to trial vanilla oral supplement for additional nutrition.     Anthropometrics    Height: 5' 9" (175.3 cm), Height Method: Stated  Last " Weight: 52.2 kg (115 lb) (24 1206), Weight Method: Stated  BMI (Calculated): 17  BMI Classification: underweight (BMI less than 18.5)        Ideal Body Weight (IBW), Male: 160 lb     % Ideal Body Weight, Male (lb): 71.88 %                 Usual Body Weight (UBW), k.72 kg  % Usual Body Weight: 71.88     Usual Weight Provided By: patient    Wt Readings from Last 5 Encounters:   24 52.2 kg (115 lb)   24 61 kg (134 lb 7.7 oz)   24 63 kg (138 lb 14.2 oz)   02/15/24 63.5 kg (140 lb)   10/06/22 70.4 kg (155 lb 3.3 oz)     Weight Change(s) Since Admission:   Wt Readings from Last 1 Encounters:   24 1206 52.2 kg (115 lb)   24 0850 52.2 kg (115 lb 1.3 oz)   24 1332 52.2 kg (115 lb)   Admit Weight: 52.2 kg (115 lb) (24 1332), Weight Method: Bed Scale    Estimated Needs    Weight Used For Calorie Calculations: 52.2 kg (115 lb 1.3 oz)  Energy Calorie Requirements (kcal): 1827-2088kcals/d (35-40kcals/kg)  Energy Need Method: Kcal/kg  Weight Used For Protein Calculations: 52.2 kg (115 lb 1.3 oz)  Protein Requirements: 68-78g/d (1.3-1.5g/kg)  Fluid Requirements (mL): 1827-2088ml fl/d (1ml/kg)    Enteral Nutrition     Patient not receiving enteral nutrition at this time.    Parenteral Nutrition     Patient not receiving parenteral nutrition support at this time.    Evaluation of Received Nutrient Intake    Calories: meeting estimated needs  Protein: meeting estimated needs    Patient Education     Not applicable.    Nutrition Diagnosis     PES: Severe acute disease or injury related malnutrition related to altered GI function as evidenced by moderate fat depletion, moderate muscle depletion, and greater than 5% weight loss in 1 month. (new)    Nutrition Interventions     Intervention(s): modified composition of meals/snacks, commercial beverage, and collaboration with other providers    Goal: Meet greater than 80% of nutritional needs by follow-up. (new)  Goal: Maintain weight  throughout hospitalization. (new)    Nutrition Goals & Monitoring     Dietitian will monitor: energy intake, weight change, electrolyte/renal panel, and glucose/endocrine profile    Nutrition Risk/Follow-Up: moderate (follow-up in 3-5 days)   Please consult if re-assessment needed sooner.

## 2024-04-10 NOTE — TRANSFER OF CARE
"Anesthesia Transfer of Care Note    Patient: Heath Holder    Procedure(s) Performed: Procedure(s) (LRB):  COLON (N/A)    Patient location: GI    Anesthesia Type: general    Transport from OR: Transported from OR on room air with adequate spontaneous ventilation    Post pain: adequate analgesia    Post assessment: no apparent anesthetic complications and tolerated procedure well    Post vital signs: stable    Level of consciousness: alert, oriented and awake    Nausea/Vomiting: no nausea/vomiting    Complications: none    Transfer of care protocol was followed      Last vitals: Visit Vitals  BP (!) 147/85   Pulse 88   Temp 36.4 °C (97.5 °F)   Resp 12   Ht 5' 9" (1.753 m)   Wt 52.2 kg (115 lb)   SpO2 99%   BMI 16.98 kg/m²     "

## 2024-04-10 NOTE — PROGRESS NOTES
[facility] Hospital Medicine Progress Note        Chief Complaint: Inpatient Follow-up     HPI:   68 year old male with a pmh of HTN, DM2, and chronic back pain who presented to the ED with c/o RUQ abdominal pain x 1 month, getting progressively worse.  He also states that he has been having diarrhea, especially after eating.  Denies fever, chills, chest pain, vomiting.  He states that he was seen at another ER and told that he had gallstones and to follow up with surgery.     ED vitals on arrival:  Temp 98.3° F, pulse 92, resp 18, /98, SpO2 97% on RA.  Today's ED lab work revealed H&H 19.7/56.7, glucose 224, , ALT 89.  CT abdomen pelvis with IV contrast showed edema and hyperemia of the distal stomach.  Gastritis is suspected.  Abdominal ultrasound showed cholelithiasis with positive sonographic Jurado's sign, but no gallbladder wall thickening or clear ascites.  Appearance is equivocal for early cholecystitis.  No biliary dilatation.  Surgery was consulted in the ED.  He was admitted to Hospital Medicine for management.  GI took for upper endoscopy on 04/09/2024.  Revealed esophagitis and gastritis.  Biopsies were taken.  Due to continued pain he was scheduled for colonoscopy on 04/10.        Interval Hx:  Patient was doing okay this morning.  Still with some abdominal pain.  Blood pressure better controlled but still elevated.  He was tolerating his bowel prep for scheduled colonoscopy this morning.  NPO.  Wife is at the bedside.     Objective/physical exam:  General: In no acute distress, afebrile  Chest: Clear to auscultation bilaterally  Heart: RRR, +S1, S2, no appreciable murmur  Abdomen: Soft, nontender, BS +  MSK: Warm, no lower extremity edema, no clubbing or cyanosis  Neurologic: Alert and oriented x4, Cranial nerve II-XII intact, Strength 5/5 in all 4 extremities    VITAL SIGNS: 24 HRS MIN & MAX LAST   Temp  Min: 97.3 °F (36.3 °C)  Max: 99 °F (37.2 °C) 97.5 °F  (36.4 °C)   BP  Min: 117/74  Max: 174/102 (!) 147/85   Pulse  Min: 73  Max: 96  88   Resp  Min: 12  Max: 29 12   SpO2  Min: 96 %  Max: 100 % 99 %       Recent Labs   Lab 04/08/24  1448 04/09/24  0512   WBC 7.69 6.91   RBC 6.45* 6.21*   HGB 19.7* 18.9*   HCT 56.7* 55.5*   MCV 87.9 89.4   MCH 30.5 30.4   MCHC 34.7 34.1   RDW 11.6 11.5    212   MPV 10.5* 10.4       Recent Labs   Lab 04/08/24  1448 04/09/24  0512    138   K 4.3 4.5   CO2 26 23   BUN 19.4 16.8   CREATININE 0.94 0.84   CALCIUM 9.8 9.7   MG 2.00 1.80   ALBUMIN 3.9 3.6   ALKPHOS 98 84   ALT 89* 80*   * 62*   BILITOT 0.7 1.0          Microbiology Results (last 7 days)       ** No results found for the last 168 hours. **             Radiology:  US Mesenteric Ischemia Study (xpd)  Narrative: EXAMINATION:  US MESENTERIC ISCHEMIA STUDY (XPD)    CLINICAL HISTORY:  eval mesenteric vessels;    TECHNIQUE:  Abdominal ultrasound (attention mesenteric arterial vasculature) was performed.  Spectral Doppler evaluation performed.    COMPARISON:  CT abdomen pelvis 04/08/2024    FINDINGS:  Aortic atherosclerosis without aneurysm.  Peak systolic velocity 74 centimeters/second.    Celiac artery peak systolic velocity 234 centimeters/second at expiration and 311 centimeters/second with inspiration.    Superior mesenteric artery peak systolic velocity 748 centimeters/second proximally, 188 centimeters/second mid, 132 centimeters/second distally.    SHALINI peak systolic velocity 110 centimeters/second.  Impression: Mildly elevated velocity at the celiac artery, of uncertain clinical significance.  There was mild calcific atherosclerosis at the origin of the celiac on CT exam without significant stenosis.    Electronically signed by: Yovana Landry  Date:    04/09/2024  Time:    15:44  NM Hepatobiliary Scan (HIDA)  Narrative: EXAMINATION:  NM HEPATOBILIARY IMAGING INC GB (HIDA)    CLINICAL HISTORY:  Cholelithiasis (Ped 0-18y);    TECHNIQUE:  8.0 mCi of  intravenous Choletec was administered followed by focused gamma camera imaging of the abdomen.    COMPARISON:  Ultrasound abdomen April 8, 2024    FINDINGS:  There is prompt hepatocellular uptake. Central biliary activity identified with gallbladder filling by 45 minutes. There is progressive gallbladder filling over 90 minutes. Small bowel activity is identified by 45 minutes progressing through 90 minutes of imaging.  Impression: Negative for acute cholecystitis.    Electronically signed by: Pedro Garcia  Date:    04/09/2024  Time:    10:50      Scheduled Med:   enoxparin  40 mg Subcutaneous Q24H (prophylaxis, 1700)    lisinopriL  40 mg Oral Daily    nicotine  1 patch Transdermal Daily    pantoprazole  40 mg Intravenous Q12H    sucralfate  1 g Oral QID        Continuous Infusions:   lactated ringers 100 mL/hr at 04/09/24 1334        PRN Meds:  acetaminophen, aluminum-magnesium hydroxide-simethicone, dextrose 10%, dextrose 10%, glucagon (human recombinant), hydrALAZINE, insulin aspart U-100, labetalol, melatonin, ondansetron, polyethylene glycol, prochlorperazine, senna-docusate 8.6-50 mg, sodium chloride 0.9%       Assessment/Plan:   Abdominal pain  Accelerated blood pressure     History:  HTN, DM2, and chronic back pain    Status post EGD on 04/09.  Noted gastritis and duodenitis.  Biopsies were taken.  GI recommending continuing PPI.  Considering ultrasound of the abdomen with Doppler of the celiac artery and SMA.    Patient was going for colonoscopy this morning.  Will follow up report and recommendations.  Ultrasound of the mesenteric artery showed mildly elevated velocity of the celiac artery of unknown clinical significance.  No significant stenosis noted.    HIDA scan was negative for acute cholecystitis.  General surgery is following but no planned intervention at this point.  Biggest concern is for possible ischemic colitis.  Still with elevated transaminases on labs this morning .  Hepatitis panel has  been ordered.  Patient was still requiring IV antihypertensives.  He was back on his home lisinopril.  Will add some Norvasc this morning    Critical care diagnosis: hypertensive urgency requiring iv antihypertensives  Critical care interventions: hands on evaluation, review of labs/radiographs/records and discussions with family  Critical care time spent: >32 minutes     Zain Abernathy MD   04/10/2024     All diagnosis and differential diagnosis have been reviewed; assessment and plan has been documented; I have personally reviewed the labs and test results that are presently available; I have reviewed the patients medication list; I have reviewed the consulting providers response and recommendations. I have reviewed or attempted to review medical records based upon their availability    All of the patient's questions have been  addressed and answered. Patient's is agreeable to the above stated plan. I will continue to monitor closely and make adjustments to medical management as needed.  _____________________________________________________________________

## 2024-04-10 NOTE — ANESTHESIA PREPROCEDURE EVALUATION
04/10/2024  Heath Holder is a 68 y.o., male.    Pre-operative evaluation for Procedure(s) (LRB):  COLON (N/A)    Had EGD yesterday 4/9/2024.  Heath Holder is a 68 y.o. male     Patient Active Problem List   Diagnosis    HTN (hypertension)    Abnormal CBC    Hyperglycemia    Elevated liver enzymes    Well adult exam    Controlled type 2 diabetes mellitus without complication, without long-term current use of insulin    Chronic back pain    Prostate cancer screening    Uncontrolled type 2 diabetes mellitus with hyperglycemia    Acute calculous cholecystitis    Constipation with overflow       Review of patient's allergies indicates:  No Known Allergies    No current facility-administered medications on file prior to encounter.     Current Outpatient Medications on File Prior to Encounter   Medication Sig Dispense Refill    indomethacin (INDOCIN) 25 MG capsule Take 1 capsule (25 mg total) by mouth 2 (two) times daily as needed (pain). 14 capsule 0    lisinopriL (PRINIVIL,ZESTRIL) 40 MG tablet Take 1 tablet (40 mg total) by mouth once daily. 90 tablet 1    metFORMIN (GLUCOPHAGE) 1000 MG tablet Take 1 tablet (1,000 mg total) by mouth 2 (two) times daily with meals. 180 tablet 1    pantoprazole (PROTONIX) 20 MG tablet Take 1 tablet (20 mg total) by mouth once daily. 30 tablet 6    traMADoL (ULTRAM) 50 mg tablet Take 1 tablet (50 mg total) by mouth every 12 (twelve) hours as needed for Pain. 12 tablet 0       Past Surgical History:   Procedure Laterality Date    EGD, WITH CLOSED BIOPSY  4/9/2024    Procedure: EGD, WITH CLOSED BIOPSY;  Surgeon: Ravi Petit MD;  Location: [facility] ENDOSCOPY;  Service: Gastroenterology;;    ESOPHAGOGASTRODUODENOSCOPY N/A 4/9/2024    Procedure: EGD;  Surgeon: Ravi Petit MD;  Location: [facility] ENDOSCOPY;  Service: Gastroenterology;  Laterality: N/A;    HIP SURGERY Left   "   ORIF HIP FRACTURE Right     >10 years     CBC:   Recent Labs     04/08/24  1448 04/09/24  0512   WBC 7.69 6.91   RBC 6.45* 6.21*   HGB 19.7* 18.9*   HCT 56.7* 55.5*    212   MCV 87.9 89.4   MCH 30.5 30.4   MCHC 34.7 34.1       CMP:   Recent Labs     04/08/24  1448 04/09/24  0512    138   K 4.3 4.5   CO2 26 23   BUN 19.4 16.8   CREATININE 0.94 0.84   MG 2.00 1.80   PHOS  --  3.9   CALCIUM 9.8 9.7   ALBUMIN 3.9 3.6   ALKPHOS 98 84   ALT 89* 80*   * 62*   BILITOT 0.7 1.0       INR  No results for input(s): "PT", "INR", "PROTIME", "APTT" in the last 72 hours.      Last 3 sets of Vitals        3/30/2024     6:32 PM 4/8/2024     1:32 PM 4/9/2024     8:50 AM   Vitals - 1 value per visit   SYSTOLIC 190 181    DIASTOLIC 99 98    Pulse 75 92    Temp 36.4 °C (97.5 °F) 36.8 °C (98.3 °F)    Resp 20 18    SPO2 100 % 97 %    Weight (lb) 134.48 115    Weight (kg) 61 52.164    Height 5' 9" (1.753 m)  5' 9" (1.753 m)   BMI (Calculated) 19.9  17         BMP  Lab Results   Component Value Date     04/09/2024    K 4.5 04/09/2024    CO2 23 04/09/2024    BUN 16.8 04/09/2024    CREATININE 0.84 04/09/2024    CALCIUM 9.7 04/09/2024      Lab Results   Component Value Date    LIPASE 27 04/09/2024        Lab Results   Component Value Date    HGBA1C 9.3 (H) 03/18/2024       Diagnostic Studies:  CXR :  EKG 2022:  2D Echo :  No results found for this or any previous visit.  Pre-op Assessment    I have reviewed the Patient Summary Reports.     I have reviewed the Nursing Notes. I have reviewed the NPO Status.   I have reviewed the Medications.     Review of Systems  Anesthesia Hx:  No problems with previous Anesthesia   History of prior surgery of interest to airway management or planning:  Previous anesthesia: MAC, General ORIF hip Fx with general anesthesia.       EGD with MAC.  Procedure performed at an [facility].  Denies Family Hx of Anesthesia complications.    Denies Personal Hx of Anesthesia complications. "                    Social:  Smoker 1/2PPDx 53 yrs.      Cardiovascular:     Hypertension                                  Hypertension         Pulmonary:  Pulmonary Normal                       Hepatic/GI:      Liver Disease,            Musculoskeletal:         Spine Disorders: lumbar Chronic Pain           Neurological:    Neuromuscular Disease, (chronic low back pain with sciatica)       Chr LBP      Chronic Pain Syndrome                       Neuromuscular Disease   Endocrine:  Diabetes, type 2    Diabetes                          Physical Exam  General: Well nourished, Cooperative and Alert    Airway:  Mallampati: III / III  Mouth Opening: Normal  TM Distance: Normal    Dental:  Periodontal disease  Poor dentition, multiple missing  No loose teeth      Anesthesia Plan  Type of Anesthesia, risks & benefits discussed:    Anesthesia Type: Gen Natural Airway  Intra-op Monitoring Plan: Standard ASA Monitors  Post Op Pain Control Plan: IV/PO Opioids PRN  Induction:  IV  Informed Consent: Informed consent signed with the Patient and all parties understand the risks and agree with anesthesia plan.  All questions answered.   ASA Score: 3  Day of Surgery Review of History & Physical: H&P Update referred to the surgeon/provider.    Ready For Surgery From Anesthesia Perspective.     .

## 2024-04-10 NOTE — PLAN OF CARE
Problem: Adult Inpatient Plan of Care  Goal: Plan of Care Review  Outcome: Ongoing, Progressing  Goal: Patient-Specific Goal (Individualized)  Outcome: Ongoing, Progressing  Goal: Absence of Hospital-Acquired Illness or Injury  Outcome: Ongoing, Progressing  Goal: Optimal Comfort and Wellbeing  Outcome: Ongoing, Progressing     Problem: Diabetes Comorbidity  Goal: Blood Glucose Level Within Targeted Range  Outcome: Ongoing, Progressing      patient BIBA from home. he states that his wife is in the hospital and he has not been able to take care of himself. complains of generalized weakness. patient states that his super has been coming in to check on him. denies chest pain, fever, chills, n/v/d.

## 2024-04-10 NOTE — PROVATION PATIENT INSTRUCTIONS
Discharge Summary/Instructions after an Endoscopic Procedure  Patient Name: Heath Holder  Patient MRN: [id number]  Patient YOB: 1955  Wednesday, April 10, 2024  Ravi Petit MD  Dear patient,  As a result of recent federal legislation (The Federal Cures Act), you may   receive lab or pathology results from your procedure in your [facility]   account before your physician is able to contact you. Your physician or   their representative will relay the results to you with their   recommendations at their soonest availability.  Thank you,  RESTRICTIONS:  During your procedure today, you received medications for sedation.  These   medications may affect your judgment, balance and coordination.  Therefore,   for 24 hours, you have the following restrictions:   - DO NOT drive a car, operate machinery, make legal/financial decisions,   sign important papers or drink alcohol.    ACTIVITY:  Today: no heavy lifting, straining or running due to procedural   sedation/anesthesia.  The following day: return to full activity including work.  DIET:  Eat and drink normally unless instructed otherwise.     TREATMENT FOR COMMON SIDE EFFECTS:  - Mild abdominal pain, nausea, belching, bloating or excessive gas:  rest,   eat lightly and use a heating pad.  - Sore Throat: treat with throat lozenges and/or gargle with warm salt   water.  - Because air was used during the procedure, expelling large amounts of air   from your rectum or belching is normal.  - If a bowel prep was taken, you may not have a bowel movement for 1-3 days.    This is normal.  SYMPTOMS TO WATCH FOR AND REPORT TO YOUR PHYSICIAN:  1. Abdominal pain or bloating, other than gas cramps.  2. Chest pain.  3. Back pain.  4. Signs of infection such as: chills or fever occurring within 24 hours   after the procedure.  5. Rectal bleeding, which would show as bright red, maroon, or black stools.   (A tablespoon of blood from the rectum is not serious, especially  if   hemorrhoids are present.)  6. Vomiting.  7. Weakness or dizziness.  GO DIRECTLY TO THE NEAREST EMERGENCY ROOM IF YOU HAVE ANY OF THE FOLLOWING:      Difficulty breathing              Chills and/or fever over 101 F   Persistent vomiting and/or vomiting blood   Severe abdominal pain   Severe chest pain   Black, tarry stools   Bleeding- more than one tablespoon   Any other symptom or condition that you feel may need urgent attention  Your doctor recommends these additional instructions:  If any biopsies were taken, your doctors clinic will contact you in 1 to 2   weeks with any results.  - Return patient to hospital [facility] for ongoing care.   - Advance diet as tolerated today.   - Repeat colonoscopy in 10 years for screening purposes.   - The findings and recommendations were discussed with the designated   responsible adult.  For questions, problems or results please call your physician - Ravi Petit MD at Work:  [phone].  [facility] EMERGENCY ROOM PHONE NUMBER: [phone]  IF A COMPLICATION OR EMERGENCY SITUATION ARISES AND YOU ARE UNABLE TO REACH   YOUR PHYSICIAN - GO DIRECTLY TO THE EMERGENCY ROOM.  MD Ravi Rosales MD  4/10/2024 9:49:02 AM  This report has been verified and signed electronically.  Dear patient,  As a result of recent federal legislation (The Federal Cures Act), you may   receive lab or pathology results from your procedure in your [facility]   account before your physician is able to contact you. Your physician or   their representative will relay the results to you with their   recommendations at their soonest availability.  Thank you,  PROVATION

## 2024-04-10 NOTE — PROGRESS NOTES
"   Acute Care Surgery   Progress Note  Admit Date: 4/8/2024  HD#2  POD#Day of Surgery    Subjective:   Interval history:  ADRIANA, AF, VSS  EGD yesterday showed gastritis and duodenitis, biopsies taken  Going for colonoscopy today   Denies N/V  Pain controlled    Home Meds:  Current Outpatient Medications   Medication Instructions    indomethacin (INDOCIN) 25 mg, Oral, 2 times daily PRN    lisinopriL (PRINIVIL,ZESTRIL) 40 mg, Oral, Daily    metFORMIN (GLUCOPHAGE) 1,000 mg, Oral, 2 times daily with meals    pantoprazole (PROTONIX) 20 mg, Oral, Daily    traMADoL (ULTRAM) 50 mg, Oral, Every 12 hours PRN      Scheduled Meds:   amLODIPine  10 mg Oral Daily    enoxparin  40 mg Subcutaneous Q24H (prophylaxis, 1700)    lisinopriL  40 mg Oral Daily    nicotine  1 patch Transdermal Daily    pantoprazole  40 mg Intravenous Q12H    phenylephrine HCl in 0.9% NaCl        propofol        sucralfate  1 g Oral QID     Continuous Infusions:   lactated ringers 100 mL/hr at 04/09/24 1334     PRN Meds:acetaminophen, aluminum-magnesium hydroxide-simethicone, dextrose 10%, dextrose 10%, glucagon (human recombinant), hydrALAZINE, insulin aspart U-100, labetalol, melatonin, ondansetron, phenylephrine HCl in 0.9% NaCl, polyethylene glycol, prochlorperazine, propofol, senna-docusate 8.6-50 mg, sodium chloride 0.9%     Objective:     VITAL SIGNS: 24 HR MIN & MAX LAST   Temp  Min: 97.2 °F (36.2 °C)  Max: 99 °F (37.2 °C)  97.2 °F (36.2 °C)   BP  Min: 111/62  Max: 163/105  (!) 141/67    Pulse  Min: 73  Max: 96  87    Resp  Min: 11  Max: 29  14    SpO2  Min: 96 %  Max: 100 %  99 %      HT: 5' 9" (175.3 cm)  WT: 52.2 kg (115 lb)  BMI: 17     Intake/output:  Intake/Output - Last 3 Shifts         04/08 0700  04/09 0659 04/09 0700  04/10 0659 04/10 0700  04/11 0659    P.O.  120     I.V. (mL/kg)  400 (7.7)     Total Intake(mL/kg)  520 (10)     Net  +520            Urine Occurrence  1 x     Stool Occurrence  0 x             Intake/Output Summary (Last 24 " "hours) at 4/10/2024 1118  Last data filed at 4/9/2024 1700  Gross per 24 hour   Intake 520 ml   Output --   Net 520 ml           Lines/drains/airway:       Peripheral IV - Single Lumen 04/08/24 2129 22 G Left;Posterior Forearm (Active)   Site Assessment Clean;Dry;Intact 04/09/24 0850   Extremity Assessment Distal to IV No abnormal discoloration 04/09/24 0850   Line Status Infusing 04/09/24 0850   Dressing Status Clean;Dry;Intact 04/09/24 0850   Dressing Intervention Integrity maintained 04/09/24 0850   Number of days: 0       Physical Examination:  General: Thin male, no acute distress  CV: RR  Resp: NWOB  GI:  Abdomen soft, tenderness to lower quadrants and RUQ, maximal tenderness to RLQ  :  Deferred  MSK: moving all extremities spontaneously  Skin/wounds:  No rashes, ulcers, erythema    Labs:  Renal:  Recent Labs     04/08/24  1448 04/09/24  0512   BUN 19.4 16.8   CREATININE 0.94 0.84     No results for input(s): "LACTIC" in the last 72 hours.  FENGI:  Recent Labs     04/08/24  1448 04/09/24  0512    138   K 4.3 4.5   CO2 26 23   CALCIUM 9.8 9.7   MG 2.00 1.80   PHOS  --  3.9   ALBUMIN 3.9 3.6   BILITOT 0.7 1.0   * 62*   ALKPHOS 98 84   ALT 89* 80*     Heme:  Recent Labs     04/08/24  1448 04/09/24  0512   HGB 19.7* 18.9*   HCT 56.7* 55.5*    212     ID:  Recent Labs     04/08/24  1448 04/09/24  0512   WBC 7.69 6.91     CBG:  Recent Labs     04/08/24  1448 04/09/24  0512   GLUCOSE 224* 134*        Imaging:  US Mesenteric Ischemia Study (xpd)   Final Result      Mildly elevated velocity at the celiac artery, of uncertain clinical significance.  There was mild calcific atherosclerosis at the origin of the celiac on CT exam without significant stenosis.         Electronically signed by: Yovana Landry   Date:    04/09/2024   Time:    15:44      NM Hepatobiliary Scan (HIDA)   Final Result      Negative for acute cholecystitis.         Electronically signed by: Pedro Garcia   Date:    04/09/2024 "   Time:    10:50      CT Abdomen Pelvis With IV Contrast NO Oral Contrast   Final Result      Edema and hyperemia of the distal stomach.  Gastritis is suspected.         Electronically signed by: Tai Valiente   Date:    04/08/2024   Time:    21:43      US Abdomen Limited   Final Result      Cholelithiasis with positive sonographic Jurado sign, but no gallbladder wall thickening or clear ascites.  Appearance is equivocal for early cholecystitis.  No biliary dilatation.         Electronically signed by: Ottoniel Michel   Date:    04/08/2024   Time:    16:23        Assessment & Plan:   68 y M w/ history of tobacco abuse, poorly controlled DM, HTN, Hepatosteatosis, and cholelithiasis presenting w chronic abdominal pain that has occurred for several months, more recently associated with watery diarrhea with each meal and significant unintentional weight loss. Lab workup reveals normal WBC, T bili, ALP, mild LFT elevation above baseline. US equivocal for early cholecystitis with no wall thickening or pericholecystic fluid.     - No urgent surgical intervention indicated  - Patient has cholelithiasis with no evidence of cholecystitis at this time  - Will discuss with patient today about timing of cholecystectomy either this admission or on outpatient basis  - Rest of care per primary team    Doreen Lantigua MD  [employer] [facility] PGY-1

## 2024-04-10 NOTE — ANESTHESIA POSTPROCEDURE EVALUATION
Anesthesia Post Evaluation    Patient: Heath Holder    Procedure(s) Performed: Procedure(s) (LRB):  COLON (N/A)    Final Anesthesia Type: general (-TIVA Alma Delia AW)      Patient location during evaluation: GI PACU  Patient participation: Yes- Able to Participate  Level of consciousness: awake and alert, awake and oriented  Post-procedure vital signs: reviewed and stable  Pain management: adequate  Airway patency: patent    PONV status at discharge: No PONV  Anesthetic complications: no      Cardiovascular status: blood pressure returned to baseline, hemodynamically stable and stable  Respiratory status: unassisted, spontaneous ventilation and room air  Hydration status: euvolemic  Follow-up not needed.              Vitals Value Taken Time   /72 04/10/24 0958   Temp 36.2 °C (97.2 °F) 04/10/24 0948   Pulse 91 04/10/24 0958   Resp 12 04/10/24 0958   SpO2 99 % 04/10/24 0958         No case tracking events are documented in the log.      Pain/Marci Score: Marci Score: 10 (4/10/2024  9:58 AM)

## 2024-04-11 LAB
ALBUMIN SERPL-MCNC: 3.7 G/DL (ref 3.4–4.8)
ALBUMIN/GLOB SERPL: 1.2 RATIO (ref 1.1–2)
ALP SERPL-CCNC: 85 UNIT/L (ref 40–150)
ALT SERPL-CCNC: 63 UNIT/L (ref 0–55)
AST SERPL-CCNC: 40 UNIT/L (ref 5–34)
BASOPHILS # BLD AUTO: 0.05 X10(3)/MCL
BASOPHILS NFR BLD AUTO: 0.7 %
BILIRUB SERPL-MCNC: 1 MG/DL
BUN SERPL-MCNC: 14.4 MG/DL (ref 8.4–25.7)
CALCIUM SERPL-MCNC: 9.3 MG/DL (ref 8.8–10)
CHLORIDE SERPL-SCNC: 104 MMOL/L (ref 98–107)
CO2 SERPL-SCNC: 25 MMOL/L (ref 23–31)
CREAT SERPL-MCNC: 0.96 MG/DL (ref 0.73–1.18)
EOSINOPHIL # BLD AUTO: 0.05 X10(3)/MCL (ref 0–0.9)
EOSINOPHIL NFR BLD AUTO: 0.7 %
ERYTHROCYTE [DISTWIDTH] IN BLOOD BY AUTOMATED COUNT: 11.4 % (ref 11.5–17)
GFR SERPLBLD CREATININE-BSD FMLA CKD-EPI: >60 MLS/MIN/1.73/M2
GLOBULIN SER-MCNC: 3 GM/DL (ref 2.4–3.5)
GLUCOSE SERPL-MCNC: 142 MG/DL (ref 82–115)
HCT VFR BLD AUTO: 53.1 % (ref 42–52)
HCV GENTYP SERPL NAA+PROBE: ABNORMAL
HGB BLD-MCNC: 17.9 G/DL (ref 14–18)
IMM GRANULOCYTES # BLD AUTO: 0.01 X10(3)/MCL (ref 0–0.04)
IMM GRANULOCYTES NFR BLD AUTO: 0.1 %
LYMPHOCYTES # BLD AUTO: 3.06 X10(3)/MCL (ref 0.6–4.6)
LYMPHOCYTES NFR BLD AUTO: 43.8 %
MAGNESIUM SERPL-MCNC: 1.7 MG/DL (ref 1.6–2.6)
MCH RBC QN AUTO: 30 PG (ref 27–31)
MCHC RBC AUTO-ENTMCNC: 33.7 G/DL (ref 33–36)
MCV RBC AUTO: 88.9 FL (ref 80–94)
MONOCYTES # BLD AUTO: 0.58 X10(3)/MCL (ref 0.1–1.3)
MONOCYTES NFR BLD AUTO: 8.3 %
NEUTROPHILS # BLD AUTO: 3.23 X10(3)/MCL (ref 2.1–9.2)
NEUTROPHILS NFR BLD AUTO: 46.4 %
NRBC BLD AUTO-RTO: 0 %
PLATELET # BLD AUTO: 197 X10(3)/MCL (ref 130–400)
PMV BLD AUTO: 10.4 FL (ref 7.4–10.4)
POCT GLUCOSE: 165 MG/DL (ref 70–110)
POCT GLUCOSE: 204 MG/DL (ref 70–110)
POTASSIUM SERPL-SCNC: 3.8 MMOL/L (ref 3.5–5.1)
PROT SERPL-MCNC: 6.7 GM/DL (ref 5.8–7.6)
RBC # BLD AUTO: 5.97 X10(6)/MCL (ref 4.7–6.1)
SODIUM SERPL-SCNC: 138 MMOL/L (ref 136–145)
WBC # SPEC AUTO: 6.98 X10(3)/MCL (ref 4.5–11.5)

## 2024-04-11 PROCEDURE — 25000003 PHARM REV CODE 250: Performed by: INTERNAL MEDICINE

## 2024-04-11 PROCEDURE — 80053 COMPREHEN METABOLIC PANEL: CPT | Performed by: HOSPITALIST

## 2024-04-11 PROCEDURE — 83735 ASSAY OF MAGNESIUM: CPT | Performed by: HOSPITALIST

## 2024-04-11 PROCEDURE — 85025 COMPLETE CBC W/AUTO DIFF WBC: CPT | Performed by: HOSPITALIST

## 2024-04-11 PROCEDURE — C9113 INJ PANTOPRAZOLE SODIUM, VIA: HCPCS | Performed by: INTERNAL MEDICINE

## 2024-04-11 PROCEDURE — S4991 NICOTINE PATCH NONLEGEND: HCPCS | Performed by: NURSE PRACTITIONER

## 2024-04-11 PROCEDURE — 25000003 PHARM REV CODE 250: Performed by: HOSPITALIST

## 2024-04-11 PROCEDURE — 25000003 PHARM REV CODE 250: Performed by: NURSE PRACTITIONER

## 2024-04-11 PROCEDURE — 11000001 HC ACUTE MED/SURG PRIVATE ROOM

## 2024-04-11 PROCEDURE — 63600175 PHARM REV CODE 636 W HCPCS: Performed by: INTERNAL MEDICINE

## 2024-04-11 RX ADMIN — SUCRALFATE 1 G: 1 TABLET ORAL at 11:04

## 2024-04-11 RX ADMIN — NICOTINE 1 PATCH: 21 PATCH, EXTENDED RELEASE TRANSDERMAL at 09:04

## 2024-04-11 RX ADMIN — AMLODIPINE BESYLATE 10 MG: 5 TABLET ORAL at 09:04

## 2024-04-11 RX ADMIN — SUCRALFATE 1 G: 1 TABLET ORAL at 09:04

## 2024-04-11 RX ADMIN — SUCRALFATE 1 G: 1 TABLET ORAL at 08:04

## 2024-04-11 RX ADMIN — PANTOPRAZOLE SODIUM 40 MG: 40 INJECTION, POWDER, FOR SOLUTION INTRAVENOUS at 08:04

## 2024-04-11 RX ADMIN — SUCRALFATE 1 G: 1 TABLET ORAL at 04:04

## 2024-04-11 RX ADMIN — INSULIN ASPART 2 UNITS: 100 INJECTION, SOLUTION INTRAVENOUS; SUBCUTANEOUS at 10:04

## 2024-04-11 RX ADMIN — ACETAMINOPHEN 650 MG: 325 TABLET, FILM COATED ORAL at 07:04

## 2024-04-11 RX ADMIN — PANTOPRAZOLE SODIUM 40 MG: 40 INJECTION, POWDER, FOR SOLUTION INTRAVENOUS at 09:04

## 2024-04-11 RX ADMIN — LISINOPRIL 40 MG: 20 TABLET ORAL at 09:04

## 2024-04-11 NOTE — PROGRESS NOTES
"Gastroenterology Progress Note    Subjective/Interval History:  Feels better after colonoscopy prep in regard to abdominal pain but it does still persist.  No BMs since prep.  Eating less than normal due to pain.  No n/v.      Vital Signs:  BP (!) 141/78 (BP Location: Right arm, Patient Position: Sitting)   Pulse 84   Temp 98.3 °F (36.8 °C)   Resp 18   Ht 5' 9" (1.753 m)   Wt 52.2 kg (115 lb)   SpO2 98%   BMI 16.98 kg/m²   Body mass index is 16.98 kg/m².    Physical Exam:  Physical Exam  Constitutional:       General: He is not in acute distress.     Appearance: He is not ill-appearing or toxic-appearing.      Comments: thin   HENT:      Head: Normocephalic and atraumatic.      Mouth/Throat:      Mouth: Mucous membranes are moist.      Pharynx: Oropharynx is clear.   Eyes:      General: No scleral icterus.  Cardiovascular:      Rate and Rhythm: Normal rate and regular rhythm.      Pulses: Normal pulses.      Heart sounds: Normal heart sounds.   Pulmonary:      Effort: Pulmonary effort is normal. No respiratory distress.      Breath sounds: Normal breath sounds. No stridor. No wheezing or rales.   Abdominal:      General: Abdomen is flat. Bowel sounds are normal. There is no distension.      Palpations: Abdomen is soft.      Tenderness: There is abdominal tenderness (RUQ and right mid abd). There is no guarding.      Comments: Scaphoid abdomen with excess skin as testament to weight loss   Musculoskeletal:         General: Normal range of motion.      Right lower leg: No edema.      Left lower leg: No edema.   Skin:     General: Skin is warm and dry.   Neurological:      Mental Status: He is alert and oriented to person, place, and time.   Psychiatric:         Mood and Affect: Mood normal.         Behavior: Behavior normal.         Thought Content: Thought content normal.         Judgment: Judgment normal.      Labs:  Recent Results (from the past 48 hour(s))   POCT glucose    Collection Time: 04/09/24 11:17 " AM   Result Value Ref Range    POCT Glucose 138 (H) 70 - 110 mg/dL   [place] GENERIC ORDERABLE HCVQG (Hepatitis C Virus (HCV) RNA Quantification with Reflex to HCV Genotype)    Collection Time: 04/09/24 11:33 AM   Result Value Ref Range    [place] Generic Orderable SEE COMMENTS (A)    Specimen to Pathology    Collection Time: 04/09/24 12:37 PM   Result Value Ref Range    Pathology Result     POCT glucose    Collection Time: 04/09/24  5:33 PM   Result Value Ref Range    POCT Glucose 191 (H) 70 - 110 mg/dL   POCT Glucose, Hand-Held Device    Collection Time: 04/09/24  8:42 PM   Result Value Ref Range    POC Glucose 144 (A) 70 - 110 MG/DL   POCT Glucose, Hand-Held Device    Collection Time: 04/10/24  5:33 AM   Result Value Ref Range    POC Glucose 130 (A) 70 - 110 MG/DL   POCT glucose    Collection Time: 04/10/24 10:03 AM   Result Value Ref Range    POCT Glucose 123 (H) 70 - 110 mg/dL   Comprehensive Metabolic Panel    Collection Time: 04/10/24  1:48 PM   Result Value Ref Range    Sodium Level 137 136 - 145 mmol/L    Potassium Level 4.4 3.5 - 5.1 mmol/L    Chloride 103 98 - 107 mmol/L    Carbon Dioxide 26 23 - 31 mmol/L    Glucose Level 226 (H) 82 - 115 mg/dL    Blood Urea Nitrogen 16.6 8.4 - 25.7 mg/dL    Creatinine 0.88 0.73 - 1.18 mg/dL    Calcium Level Total 8.9 8.8 - 10.0 mg/dL    Protein Total 6.2 5.8 - 7.6 gm/dL    Albumin Level 3.5 3.4 - 4.8 g/dL    Globulin 2.7 2.4 - 3.5 gm/dL    Albumin/Globulin Ratio 1.3 1.1 - 2.0 ratio    Bilirubin Total 1.0 <=1.5 mg/dL    Alkaline Phosphatase 77 40 - 150 unit/L    Alanine Aminotransferase 64 (H) 0 - 55 unit/L    Aspartate Aminotransferase 43 (H) 5 - 34 unit/L    eGFR >60 mls/min/1.73/m2   CBC with Differential    Collection Time: 04/10/24  1:48 PM   Result Value Ref Range    WBC 5.96 4.50 - 11.50 x10(3)/mcL    RBC 5.77 4.70 - 6.10 x10(6)/mcL    Hgb 17.6 14.0 - 18.0 g/dL    Hct 51.9 42.0 - 52.0 %    MCV 89.9 80.0 - 94.0 fL    MCH 30.5 27.0 - 31.0 pg    MCHC 33.9 33.0 - 36.0 g/dL     RDW 11.5 11.5 - 17.0 %    Platelet 184 130 - 400 x10(3)/mcL    MPV 10.3 7.4 - 10.4 fL    Neut % 46.3 %    Lymph % 42.1 %    Mono % 10.6 %    Eos % 0.3 %    Basophil % 0.5 %    Lymph # 2.51 0.6 - 4.6 x10(3)/mcL    Neut # 2.76 2.1 - 9.2 x10(3)/mcL    Mono # 0.63 0.1 - 1.3 x10(3)/mcL    Eos # 0.02 0 - 0.9 x10(3)/mcL    Baso # 0.03 <=0.2 x10(3)/mcL    IG# 0.01 0 - 0.04 x10(3)/mcL    IG% 0.2 %    NRBC% 0.0 %   POCT glucose    Collection Time: 04/10/24  4:52 PM   Result Value Ref Range    POCT Glucose 154 (H) 70 - 110 mg/dL   POCT glucose    Collection Time: 04/10/24  8:34 PM   Result Value Ref Range    POCT Glucose 221 (H) 70 - 110 mg/dL   POCT glucose    Collection Time: 04/10/24 11:29 PM   Result Value Ref Range    POCT Glucose 149 (H) 70 - 110 mg/dL   Magnesium    Collection Time: 04/11/24  4:20 AM   Result Value Ref Range    Magnesium Level 1.70 1.60 - 2.60 mg/dL   Comprehensive Metabolic Panel    Collection Time: 04/11/24  4:20 AM   Result Value Ref Range    Sodium Level 138 136 - 145 mmol/L    Potassium Level 3.8 3.5 - 5.1 mmol/L    Chloride 104 98 - 107 mmol/L    Carbon Dioxide 25 23 - 31 mmol/L    Glucose Level 142 (H) 82 - 115 mg/dL    Blood Urea Nitrogen 14.4 8.4 - 25.7 mg/dL    Creatinine 0.96 0.73 - 1.18 mg/dL    Calcium Level Total 9.3 8.8 - 10.0 mg/dL    Protein Total 6.7 5.8 - 7.6 gm/dL    Albumin Level 3.7 3.4 - 4.8 g/dL    Globulin 3.0 2.4 - 3.5 gm/dL    Albumin/Globulin Ratio 1.2 1.1 - 2.0 ratio    Bilirubin Total 1.0 <=1.5 mg/dL    Alkaline Phosphatase 85 40 - 150 unit/L    Alanine Aminotransferase 63 (H) 0 - 55 unit/L    Aspartate Aminotransferase 40 (H) 5 - 34 unit/L    eGFR >60 mls/min/1.73/m2   CBC with Differential    Collection Time: 04/11/24  4:20 AM   Result Value Ref Range    WBC 6.98 4.50 - 11.50 x10(3)/mcL    RBC 5.97 4.70 - 6.10 x10(6)/mcL    Hgb 17.9 14.0 - 18.0 g/dL    Hct 53.1 (H) 42.0 - 52.0 %    MCV 88.9 80.0 - 94.0 fL    MCH 30.0 27.0 - 31.0 pg    MCHC 33.7 33.0 - 36.0 g/dL    RDW  11.4 (L) 11.5 - 17.0 %    Platelet 197 130 - 400 x10(3)/mcL    MPV 10.4 7.4 - 10.4 fL    Neut % 46.4 %    Lymph % 43.8 %    Mono % 8.3 %    Eos % 0.7 %    Basophil % 0.7 %    Lymph # 3.06 0.6 - 4.6 x10(3)/mcL    Neut # 3.23 2.1 - 9.2 x10(3)/mcL    Mono # 0.58 0.1 - 1.3 x10(3)/mcL    Eos # 0.05 0 - 0.9 x10(3)/mcL    Baso # 0.05 <=0.2 x10(3)/mcL    IG# 0.01 0 - 0.04 x10(3)/mcL    IG% 0.1 %    NRBC% 0.0 %       Imaging:  US Mesenteric Ischemia Study (xpd)    Result Date: 4/9/2024  EXAMINATION: US MESENTERIC ISCHEMIA STUDY (XPD) CLINICAL HISTORY: eval mesenteric vessels; TECHNIQUE: Abdominal ultrasound (attention mesenteric arterial vasculature) was performed.  Spectral Doppler evaluation performed. COMPARISON: CT abdomen pelvis 04/08/2024 FINDINGS: Aortic atherosclerosis without aneurysm.  Peak systolic velocity 74 centimeters/second. Celiac artery peak systolic velocity 234 centimeters/second at expiration and 311 centimeters/second with inspiration. Superior mesenteric artery peak systolic velocity 748 centimeters/second proximally, 188 centimeters/second mid, 132 centimeters/second distally. SHALINI peak systolic velocity 110 centimeters/second.     Mildly elevated velocity at the celiac artery, of uncertain clinical significance.  There was mild calcific atherosclerosis at the origin of the celiac on CT exam without significant stenosis. Electronically signed by: Yovana Landry Date:    04/09/2024 Time:    15:44    NM Hepatobiliary Scan (HIDA)    Result Date: 4/9/2024  EXAMINATION: NM HEPATOBILIARY IMAGING INC GB (HIDA) CLINICAL HISTORY: Cholelithiasis (Ped 0-18y); TECHNIQUE: 8.0 mCi of intravenous Choletec was administered followed by focused gamma camera imaging of the abdomen. COMPARISON: Ultrasound abdomen April 8, 2024 FINDINGS: There is prompt hepatocellular uptake. Central biliary activity identified with gallbladder filling by 45 minutes. There is progressive gallbladder filling over 90 minutes. Small bowel  activity is identified by 45 minutes progressing through 90 minutes of imaging.     Negative for acute cholecystitis. Electronically signed by: Pedro Garcia Date:    04/09/2024 Time:    10:50    CT Abdomen Pelvis With IV Contrast NO Oral Contrast    Result Date: 4/8/2024  EXAMINATION: CT ABDOMEN PELVIS WITH IV CONTRAST CLINICAL HISTORY: Epigastric pain;Nausea/vomiting; TECHNIQUE: Multidetector IV contrast enhanced axial CT images of the abdomen and pelvis were obtained with coronal and sagittal reconstructions. Automatic exposure control was utilized to reduce the patient's radiation dose. DLP= 320 COMPARISON: 03/30/2024 FINDINGS: 01. HEPATOBILIARY: No focal hepatic lesion is identified, stones scattered throughout the gallbladder. 02. SPLEEN: Normal 03. PANCREAS: No focal masses or ductal dilatation. 04. ADRENALS: No adrenal nodules. 05. KIDNEYS: The right kidney demonstrates no stone, hydronephrosis, or hydroureter. No focal mass identified. The left kidney demonstrates no stone, hydronephrosis, or hydroureter. No focal mass identified. 06. LYMPHADENOPATHY/RETROPERITONEUM: There is no retroperitoneal lymphadenopathy. The abdominal aorta is normal in course and caliber. There are diffuse scattered mural atheromatous calcifications in the aortoiliac system. 07. BOWEL: Edema and hyperemia of the distal stomach.  No evidence of appendiceal inflammation. 08. PELVIC VISCERA: Normal. No pelvic mass. 09. PELVIC LYMPH NODES: No lymphadenopathy. 10. PERITONEUM/ABDOMINAL WALL: No ascites or implant. 11. SKELETAL: No aggressive appearing lytic/blastic lesion. No acute fractures, subluxations or dislocations. 12. LUNG BASES: The visualized lungs are unremarkable.     Edema and hyperemia of the distal stomach.  Gastritis is suspected. Electronically signed by: Tai Valiente Date:    04/08/2024 Time:    21:43    US Abdomen Limited    Result Date: 4/8/2024  EXAMINATION: US ABDOMEN LIMITED CLINICAL HISTORY: RUQ pain; TECHNIQUE:  Gray-scale and color Doppler ultrasound images of the abdomen. COMPARISON: Ultrasound 03/27/2024 FINDINGS: Normal pancreas.  Normal caliber of the superior IVC. Liver has normal size and echogenicity.  Main portal vein patent with normal flow direction.  Normal CBD caliber.  Multiple shadowing gallstones.  No gallbladder wall thickening or significant ascites.  Positive sonographic Jurado sign. No hydronephrosis or defined calcification in the right kidney. Measurements: - Liver: 15.6 cm - CBD diameter: 3 mm - Right kidney: 10.6 cm in length     Cholelithiasis with positive sonographic Jurado sign, but no gallbladder wall thickening or clear ascites.  Appearance is equivocal for early cholecystitis.  No biliary dilatation. Electronically signed by: Ottoniel Michel Date:    04/08/2024 Time:    16:23    CT Abdomen Pelvis With IV Contrast NO Oral Contrast    Result Date: 3/30/2024  EXAMINATION: CT ABDOMEN PELVIS WITH IV CONTRAST CLINICAL HISTORY: Abdominal pain, acute, nonlocalized;Nausea/vomiting;Gallstones seen on US; TECHNIQUE: Multidetector axial images were obtained of the abdomen and pelvis following the administration of IV contrast. Oral contrast was not administered. Dose length product of 124 mGycm. Automated exposure control was utilized to minimize radiation dose. COMPARISON: Ultrasound abdomen March 27, 2024 FINDINGS: Included portion of the lungs are without suspicious nodularity, acute air space infiltrates or fluid within the pleural spaces. Liver is remarkable for steatosis without focal space occupying lesion.  Gallbladder lumen is remarkable for numerous calculi and there is slightly thickened gallbladder wall.  No apparent pericholecystic fluid.  There is also no significant dilatation of the intrahepatic or the extrahepatic ducts. Pancreatic unremarkable attenuation without acute peripancreatic phlegmons. Main pancreatic duct is not dilated. Spleen is of normal size without focal lesion. The adrenal  glands appear within normal limits. The kidneys are unremarkable in size and contour. No solid or cystic renal lesion identified. There is no hydronephrosis. No perinephric fluid strandings or collections identified.  There is calcified plaque and mural thrombus involving the abdominal aorta and the iliac arteries with maximum diameter of 2.0 cm. Stomach is mostly decompressed.  No abnormal dilatation of loops of small bowel and there is no focal or generalized mural thickening.  Appendix is nondilated and partially air-filled on image 36 series 6.  There is colonic fecal loading throughout consistent with constipation.  There is limited assessment of the colon.  There are no acute pericolonic strandings or evidence of bowel obstruction. Urinary bladder wall is not thickened.  There is no pelvic free fluid. Left hip appear ears ORIF.  No acute or otherwise osseous abnormality identified.     1. Colonic fecal loading throughout is consistent with constipation.  No bowel obstruction.  No pericolonic acute strandings.  Please correlate patient is up-to-date on colonoscopy. 2. Multiple gallstones and slightly thickened gallbladder wall without pericholecystic fluid or dilatation of ducts. 3. Hepatic steatosis. Electronically signed by: Pedro Garcia Date:    03/30/2024 Time:    20:57    US Abdomen Limited    Result Date: 3/27/2024  EXAMINATION: US ABDOMEN LIMITED CLINICAL HISTORY: , Unspecified viral hepatitis C without hepatic coma. TECHNIQUE: Transverse and longitudinal images of the right upper abdomen were obtained. COMPARISON: None FINDINGS: Liver: Size: 15.1 cm in the right midclavicular line, normal Appearance: There is some heterogenicity of the liver parenchyma with slight increased echogenicity increased echogenicity decreases sensitivity to detect focal lesions Mass: No focal masses Gallbladder: Stones/Sludge: Extensive shadowing is identified emanating from the region of the gallbladder which might represent  a wall enhancing sign and reflect a gallbladder full of stones Appearance: No wall thickening, pericholecystic fluid or hydrops Sonographic Jurado's Sign: Negative Bile Ducts: Intrahepatic Ducts: No dilatation Extrahepatic Ducts: Common bile duct measures 0.65 cm, no dilatation Pancreas: Visualized portions of the pancreas are normal. Right Kidney: Size: 11.5 cm in length Echogenicity: Normal Collecting System: No hydronephrosis Stone: None Cyst/Mass: None Vessels: Aorta: Visualized portions are normal. Inferior Vena Cava: Visualized portions are normal. Main Portal Vein: Patent with hepatopedal  flow. Free Fluid: No ascites or pleural effusions     Heterogenicity of the liver parenchyma with changes of hepatic steatosis. RHONDA sign suggestive of a gallbladder full of stones. No other significant abnormalities Electronically signed by: Travis Del Angel Date:    03/27/2024 Time:    09:38    X-Ray Lumbar Spine 2 Or 3 Views    Result Date: 3/18/2024  EXAMINATION: XR LUMBAR SPINE 2 OR 3 VIEWS CPT: 66470 CLINICAL HISTORY: Lumbago with sciatica, left side FINDINGS: There are 5 non rib-bearing vertebral bodies vertebral bodies are normal height, position and alignment some degenerative changes identified with marginal osteophytes at some levels as well as degenerative changes of the posterior elements at L4-L5 and L5-S1. No acute fractures or dislocations identified     Degenerative changes Electronically signed by: Travis Del Angel Date:    03/18/2024 Time:    07:12    X-Ray Hip 2 or 3 views Left (with Pelvis when performed)    Result Date: 3/18/2024  EXAMINATION: XR HIP WITH PELVIS WHEN PERFORMED, 2 OR 3 VIEWS LEFT CLINICAL HISTORY: Lumbago with sciatica, left side COMPARISON: None. FINDINGS: No acute displaced fractures or dislocations. Nail and plate and orthopedic screws identified some degenerative changes are identified of the inferior medial aspect of the hip joint articular spaces are otherwise preserved with  smooth articular surfaces with some degenerative changes of the contralateral hip and lumbosacral spine No blastic or lytic lesions. Soft tissues within normal limits.     Hardware in the left hip appears to be intact. Degenerative changes. Electronically signed by: Travis Del Angel Date:    03/18/2024 Time:    07:11         Assessment/Plan:  68 year old AA male who is unknown to our group with a pmhx of HTN, DM, and chronic low back pain. Presented to [facility] ER 4/8 with complaints of abdominal pain. GI consulted for eval of possible PUD as well as acute on chronic abdominal pain and ct showing Edema and hyperemia of distal stomach .     1.  Acute on chronic abdominal pain  2.  Diarrhea / Change in bowel habits - Overflow diarrhea also on ddx given CT suggestive of constipation.   3.  Unintentional Weight loss  4.  Hepatitis C - Ab+ and detected RNA  5.  Elevated LFTs - US with hepatic steatosis, drinks daily, and active HCV - all likely culprits.     US abdomen 4/8: Cholelithiasis with positive Jurado sign, no gallbladder wall thickening or clear ascites. Appearance is equivocal for early cholecystitis. CBD diameter 3 mm.   HIDA 4/9: negative for cholecystitis.   S/p EGD 4/9/24: Gastritis. Biopsies neg for h.pylori. Duodenitis. Biopsied with mild chronic inflammation, negative for celiac.  Doppler mesenteric vessels 4/9/24: Mildly elevated velocity at the celiac artery, of uncertain clinical significance. There was mild calcific atherosclerosis at the origin of the celiac on CT exam without significant stenosis.   S/p colonoscopy 4/10/24: normal colon and TI with good prep.     -Surgery planning for cholecystectomy tomorrow.   -Continue PPI BID x1 month.  -Needs HCV tx outpatient.     GI available if needed.      Yamileth Stockton MD

## 2024-04-11 NOTE — PROGRESS NOTES
[facility] Hospital Medicine Progress Note        Chief Complaint: Inpatient Follow-up     HPI:   68 year old male with a pmh of HTN, DM2, and chronic back pain who presented to the ED with c/o RUQ abdominal pain x 1 month, getting progressively worse.  He also states that he has been having diarrhea, especially after eating.  Denies fever, chills, chest pain, vomiting.  He states that he was seen at another ER and told that he had gallstones and to follow up with surgery.     ED vitals on arrival:  Temp 98.3° F, pulse 92, resp 18, /98, SpO2 97% on RA.  Today's ED lab work revealed H&H 19.7/56.7, glucose 224, , ALT 89.  CT abdomen pelvis with IV contrast showed edema and hyperemia of the distal stomach.  Gastritis is suspected.  Abdominal ultrasound showed cholelithiasis with positive sonographic Jurado's sign, but no gallbladder wall thickening or clear ascites.  Appearance is equivocal for early cholecystitis.  No biliary dilatation.  Surgery was consulted in the ED.  He was admitted to Hospital Medicine for management.  GI took for upper endoscopy on 04/09/2024.  Revealed esophagitis and gastritis.  Biopsies were taken.  Due to continued pain he was scheduled for colonoscopy on 04/10 which showed normal colon & terminal ileum.  Will need follow up for HCV treatment outpatient post discharge.        Interval Hx:  Today, Mr. Holder continued to endorse some abdominal pain.  Had no new complaints.  Surgery planning for cholecystectomy tomorrow; to remain NPO overnight.     Objective/physical exam:  General: In no acute distress, afebrile  Chest: Clear to auscultation bilaterally  Heart: RRR, +S1, S2, no appreciable murmur  Abdomen: Soft, nontender, BS +  MSK: Warm, no lower extremity edema, no clubbing or cyanosis  Neurologic: Alert and oriented x4, Cranial nerve II-XII intact, Strength 5/5 in all 4 extremities    VITAL SIGNS: 24 HRS MIN & MAX LAST   Temp  Min: 97.9 °F  (36.6 °C)  Max: 98.6 °F (37 °C) 98.5 °F (36.9 °C)   BP  Min: 130/74  Max: 166/78 134/83   Pulse  Min: 71  Max: 84  83   Resp  Min: 18  Max: 18 18   SpO2  Min: 96 %  Max: 99 % 99 %       Recent Labs   Lab 04/09/24  0512 04/10/24  1348 04/11/24  0420   WBC 6.91 5.96 6.98   RBC 6.21* 5.77 5.97   HGB 18.9* 17.6 17.9   HCT 55.5* 51.9 53.1*   MCV 89.4 89.9 88.9   MCH 30.4 30.5 30.0   MCHC 34.1 33.9 33.7   RDW 11.5 11.5 11.4*    184 197   MPV 10.4 10.3 10.4         Recent Labs   Lab 04/08/24  1448 04/09/24  0512 04/10/24  1348 04/11/24  0420    138 137 138   K 4.3 4.5 4.4 3.8   CO2 26 23 26 25   BUN 19.4 16.8 16.6 14.4   CREATININE 0.94 0.84 0.88 0.96   CALCIUM 9.8 9.7 8.9 9.3   MG 2.00 1.80  --  1.70   ALBUMIN 3.9 3.6 3.5 3.7   ALKPHOS 98 84 77 85   ALT 89* 80* 64* 63*   * 62* 43* 40*   BILITOT 0.7 1.0 1.0 1.0       Assessment/Plan:   Abdominal pain 2/2 Acute Cholecystitis vs possible Hepatitis  Uncontrolled HTN  DM type 2   Chronic back pain     Continues to be admitted   Reports abdominal pain in RUQ and epigastric region; no new complaints   Planned for cholecystectomy tomorrow with surgery; to remain NPO overnight   Colonoscopy showed normal colon and terminal ileum  Status post EGD on 04/09.  Noted gastritis and duodenitis.  Biopsies were taken.  GI recommending continuing PPI.    Ultrasound of the mesenteric artery showed mildly elevated velocity of the celiac artery of unknown clinical significance.  No significant stenosis noted.    HIDA scan was negative for acute cholecystitis\  General surgery on board; follow recommendations  Will need outpatient follow up for hepatitis-C  He is to have elevated AST/ALT  Continues to be on IV  mL/hour  Continues to be on amlodipine 10 mg daily, lisinopril 40 mg daily, Protonix 40 mg b.i.d., Carafate 1 g q.i.d.    PPX: Mary Jane Waldron MD   04/11/2024     All diagnosis and differential diagnosis have been reviewed; assessment and plan has been  documented; I have personally reviewed the labs and test results that are presently available; I have reviewed the patients medication list; I have reviewed the consulting providers response and recommendations. I have reviewed or attempted to review medical records based upon their availability    All of the patient's questions have been  addressed and answered. Patient's is agreeable to the above stated plan. I will continue to monitor closely and make adjustments to medical management as needed.  _____________________________________________________________________

## 2024-04-11 NOTE — PROGRESS NOTES
"   Acute Care Surgery   Progress Note  Admit Date: 4/8/2024  HD#3  POD#1 Day Post-Op    Subjective:   Interval history:  NAEON, AF, VSS  EGD yesterday showed gastritis and duodenitis, biopsies taken  Normal colonoscopy  Denies N/V  Pain controlled    Home Meds:  Current Outpatient Medications   Medication Instructions    indomethacin (INDOCIN) 25 mg, Oral, 2 times daily PRN    lisinopriL (PRINIVIL,ZESTRIL) 40 mg, Oral, Daily    metFORMIN (GLUCOPHAGE) 1,000 mg, Oral, 2 times daily with meals    pantoprazole (PROTONIX) 20 mg, Oral, Daily    traMADoL (ULTRAM) 50 mg, Oral, Every 12 hours PRN      Scheduled Meds:   amLODIPine  10 mg Oral Daily    enoxparin  40 mg Subcutaneous Q24H (prophylaxis, 1700)    lisinopriL  40 mg Oral Daily    nicotine  1 patch Transdermal Daily    pantoprazole  40 mg Intravenous Q12H    sucralfate  1 g Oral QID     Continuous Infusions:   lactated ringers 100 mL/hr at 04/09/24 1334     PRN Meds:acetaminophen, aluminum-magnesium hydroxide-simethicone, dextrose 10%, dextrose 10%, glucagon (human recombinant), hydrALAZINE, insulin aspart U-100, labetalol, melatonin, ondansetron, polyethylene glycol, prochlorperazine, senna-docusate 8.6-50 mg, sodium chloride 0.9%     Objective:     VITAL SIGNS: 24 HR MIN & MAX LAST   Temp  Min: 97.2 °F (36.2 °C)  Max: 98.6 °F (37 °C)  98.3 °F (36.8 °C)   BP  Min: 111/62  Max: 166/78  (!) 166/78    Pulse  Min: 71  Max: 96  71    Resp  Min: 11  Max: 18  18    SpO2  Min: 96 %  Max: 99 %  99 %      HT: 5' 9" (175.3 cm)  WT: 52.2 kg (115 lb)  BMI: 17     Intake/output:  Intake/Output - Last 3 Shifts         04/09 0700  04/10 0659 04/10 0700  04/11 0659    P.O. 120 340    I.V. (mL/kg) 400 (7.7)     Total Intake(mL/kg) 520 (10) 340 (6.5)    Net +520 +340          Urine Occurrence 1 x 6 x    Stool Occurrence 0 x 0 x            Intake/Output Summary (Last 24 hours) at 4/11/2024 0646  Last data filed at 4/10/2024 2153  Gross per 24 hour   Intake 340 ml   Output --   Net 340 " "ml           Lines/drains/airway:       Peripheral IV - Single Lumen 04/08/24 2129 22 G Left;Posterior Forearm (Active)   Site Assessment Clean;Dry;Intact 04/09/24 0850   Extremity Assessment Distal to IV No abnormal discoloration 04/09/24 0850   Line Status Infusing 04/09/24 0850   Dressing Status Clean;Dry;Intact 04/09/24 0850   Dressing Intervention Integrity maintained 04/09/24 0850   Number of days: 0       Physical Examination:  General: Thin male, no acute distress  CV: RR  Resp: NWOB  GI:  Abdomen soft, tenderness to lower quadrants and RUQ, maximal tenderness to RLQ  :  Deferred  MSK: moving all extremities spontaneously  Skin/wounds:  No rashes, ulcers, erythema    Labs:  Renal:  Recent Labs     04/08/24  1448 04/09/24  0512 04/10/24  1348 04/11/24  0420   BUN 19.4 16.8 16.6 14.4   CREATININE 0.94 0.84 0.88 0.96     No results for input(s): "LACTIC" in the last 72 hours.  FENGI:  Recent Labs     04/08/24  1448 04/09/24  0512 04/10/24  1348 04/11/24  0420    138 137 138   K 4.3 4.5 4.4 3.8   CO2 26 23 26 25   CALCIUM 9.8 9.7 8.9 9.3   MG 2.00 1.80  --  1.70   PHOS  --  3.9  --   --    ALBUMIN 3.9 3.6 3.5 3.7   BILITOT 0.7 1.0 1.0 1.0   * 62* 43* 40*   ALKPHOS 98 84 77 85   ALT 89* 80* 64* 63*     Heme:  Recent Labs     04/08/24 1448 04/09/24  0512 04/10/24  1348 04/11/24  0420   HGB 19.7* 18.9* 17.6 17.9   HCT 56.7* 55.5* 51.9 53.1*    212 184 197     ID:  Recent Labs     04/08/24 1448 04/09/24  0512 04/10/24  1348 04/11/24  0420   WBC 7.69 6.91 5.96 6.98     CBG:  Recent Labs     04/08/24 1448 04/09/24  0512 04/10/24  1348 04/11/24  0420   GLUCOSE 224* 134* 226* 142*        Imaging:  US Mesenteric Ischemia Study (xpd)   Final Result      Mildly elevated velocity at the celiac artery, of uncertain clinical significance.  There was mild calcific atherosclerosis at the origin of the celiac on CT exam without significant stenosis.         Electronically signed by: Yovana Landry "   Date:    04/09/2024   Time:    15:44      NM Hepatobiliary Scan (HIDA)   Final Result      Negative for acute cholecystitis.         Electronically signed by: Pedro Garcia   Date:    04/09/2024   Time:    10:50      CT Abdomen Pelvis With IV Contrast NO Oral Contrast   Final Result      Edema and hyperemia of the distal stomach.  Gastritis is suspected.         Electronically signed by: Tai Valiente   Date:    04/08/2024   Time:    21:43      US Abdomen Limited   Final Result      Cholelithiasis with positive sonographic Jurado sign, but no gallbladder wall thickening or clear ascites.  Appearance is equivocal for early cholecystitis.  No biliary dilatation.         Electronically signed by: Ottoniel Michel   Date:    04/08/2024   Time:    16:23        Assessment & Plan:   68 y M w/ history of tobacco abuse, poorly controlled DM, HTN, Hepatosteatosis, and cholelithiasis presenting w chronic abdominal pain that has occurred for several months, more recently associated with watery diarrhea with each meal and significant unintentional weight loss. Lab workup reveals normal WBC, T bili, ALP, mild LFT elevation above baseline. US equivocal for early cholecystitis with no wall thickening or pericholecystic fluid.     - Discussed timing of cholecystectomy either this admission or on outpatient basis  - Patient would like to proceed with surgery while inpatient  - Plan for OR tomorrow for robotic cholecystectomy  - Consents to be obtained  - NPO at midnight  - Rest of care per primary team    Doreen Lantigua MD  \[facility] [place] [facility]\"" General [facility] PGY-1

## 2024-04-12 VITALS
SYSTOLIC BLOOD PRESSURE: 156 MMHG | TEMPERATURE: 99 F | DIASTOLIC BLOOD PRESSURE: 88 MMHG | HEART RATE: 71 BPM | HEIGHT: 69 IN | RESPIRATION RATE: 18 BRPM | WEIGHT: 115 LBS | OXYGEN SATURATION: 98 % | BODY MASS INDEX: 17.03 KG/M2

## 2024-04-12 LAB
POCT GLUCOSE: 121 MG/DL (ref 70–110)
POCT GLUCOSE: 123 MG/DL (ref 70–110)
POCT GLUCOSE: 129 MG/DL (ref 70–110)

## 2024-04-12 PROCEDURE — C9113 INJ PANTOPRAZOLE SODIUM, VIA: HCPCS | Performed by: INTERNAL MEDICINE

## 2024-04-12 PROCEDURE — 25000003 PHARM REV CODE 250: Performed by: HOSPITALIST

## 2024-04-12 PROCEDURE — 25000003 PHARM REV CODE 250: Performed by: INTERNAL MEDICINE

## 2024-04-12 PROCEDURE — 63600175 PHARM REV CODE 636 W HCPCS: Performed by: INTERNAL MEDICINE

## 2024-04-12 PROCEDURE — 25000003 PHARM REV CODE 250: Performed by: NURSE PRACTITIONER

## 2024-04-12 RX ORDER — HYDROCODONE BITARTRATE AND ACETAMINOPHEN 5; 325 MG/1; MG/1
1 TABLET ORAL EVERY 12 HOURS PRN
Qty: 8 TABLET | Refills: 0 | Status: SHIPPED | OUTPATIENT
Start: 2024-04-12 | End: 2024-04-16

## 2024-04-12 RX ORDER — AMLODIPINE BESYLATE 10 MG/1
10 TABLET ORAL DAILY
Qty: 90 TABLET | Refills: 3 | Status: SHIPPED | OUTPATIENT
Start: 2024-04-13 | End: 2025-04-13

## 2024-04-12 RX ORDER — PANTOPRAZOLE SODIUM 40 MG/1
40 TABLET, DELAYED RELEASE ORAL 2 TIMES DAILY
Qty: 60 TABLET | Refills: 0 | Status: SHIPPED | OUTPATIENT
Start: 2024-04-12 | End: 2024-06-17

## 2024-04-12 RX ORDER — SUCRALFATE 1 G/1
1 TABLET ORAL 4 TIMES DAILY
Qty: 120 TABLET | Refills: 3 | Status: SHIPPED | OUTPATIENT
Start: 2024-04-12

## 2024-04-12 RX ADMIN — AMLODIPINE BESYLATE 10 MG: 5 TABLET ORAL at 08:04

## 2024-04-12 RX ADMIN — LISINOPRIL 40 MG: 20 TABLET ORAL at 08:04

## 2024-04-12 RX ADMIN — SUCRALFATE 1 G: 1 TABLET ORAL at 08:04

## 2024-04-12 RX ADMIN — ACETAMINOPHEN 650 MG: 325 TABLET, FILM COATED ORAL at 12:04

## 2024-04-12 RX ADMIN — PANTOPRAZOLE SODIUM 40 MG: 40 INJECTION, POWDER, FOR SOLUTION INTRAVENOUS at 08:04

## 2024-04-12 NOTE — NURSING
Discharge instructions given to the patient and spouse. RX for norco given to the patient. Patient verbalized understanding.

## 2024-04-12 NOTE — PLAN OF CARE
Plan of Care    Surgery cancelled today due to OR availability. Discussed this with patient and family at bedside who are amenable to scheduling surgery in outpatient setting. Patient is okay for diet and stable for discharge from surgery perspective. No indication for antibiotics from surgery perspective. We will arrange for him to be scheduled for outpatient surgery. Please call with any questions or concerns.    Doreen Lantigua MD  [employer] General Surgery PGY-1

## 2024-04-12 NOTE — PLAN OF CARE
04/12/24 1132   Final Note   Assessment Type Final Discharge Note   Anticipated Discharge Disposition Home   Post-Acute Status   Discharge Delays None known at this time

## 2024-04-12 NOTE — PROGRESS NOTES
"   Acute Care Surgery   Progress Note  Admit Date: 4/8/2024  HD#4  POD#2 Days Post-Op    Subjective:   Interval history:  NAEON, AF, VSS  NPO since midnight  Denies N/V  Pain controlled    Home Meds:  Current Outpatient Medications   Medication Instructions    indomethacin (INDOCIN) 25 mg, Oral, 2 times daily PRN    lisinopriL (PRINIVIL,ZESTRIL) 40 mg, Oral, Daily    metFORMIN (GLUCOPHAGE) 1,000 mg, Oral, 2 times daily with meals    pantoprazole (PROTONIX) 20 mg, Oral, Daily    traMADoL (ULTRAM) 50 mg, Oral, Every 12 hours PRN      Scheduled Meds:   amLODIPine  10 mg Oral Daily    enoxparin  40 mg Subcutaneous Q24H (prophylaxis, 1700)    lisinopriL  40 mg Oral Daily    nicotine  1 patch Transdermal Daily    pantoprazole  40 mg Intravenous Q12H    sucralfate  1 g Oral QID     Continuous Infusions:   lactated ringers 100 mL/hr at 04/09/24 1334     PRN Meds:acetaminophen, aluminum-magnesium hydroxide-simethicone, dextrose 10%, dextrose 10%, glucagon (human recombinant), hydrALAZINE, insulin aspart U-100, labetalol, melatonin, ondansetron, polyethylene glycol, prochlorperazine, senna-docusate 8.6-50 mg, sodium chloride 0.9%     Objective:     VITAL SIGNS: 24 HR MIN & MAX LAST   Temp  Min: 97.5 °F (36.4 °C)  Max: 98.7 °F (37.1 °C)  97.5 °F (36.4 °C)   BP  Min: 129/72  Max: 152/82  (!) 143/84    Pulse  Min: 72  Max: 83  72    Resp  Min: 18  Max: 18  18    SpO2  Min: 97 %  Max: 99 %  98 %      HT: 5' 9" (175.3 cm)  WT: 52.2 kg (115 lb)  BMI: 17     Intake/output:  Intake/Output - Last 3 Shifts         04/10 0700  04/11 0659 04/11 0700 04/12 0659 04/12 0700 04/13 0659    P.O. 340 320     I.V. (mL/kg)       Total Intake(mL/kg) 340 (6.5) 320 (6.1)     Net +340 +320            Urine Occurrence 6 x 2 x     Stool Occurrence 0 x 0 x             Intake/Output Summary (Last 24 hours) at 4/12/2024 0850  Last data filed at 4/11/2024 1300  Gross per 24 hour   Intake 320 ml   Output --   Net 320 ml           Lines/drains/airway:      "  Peripheral IV - Single Lumen 04/08/24 2129 22 G Left;Posterior Forearm (Active)   Site Assessment Clean;Dry;Intact 04/09/24 0850   Extremity Assessment Distal to IV No abnormal discoloration 04/09/24 0850   Line Status Infusing 04/09/24 0850   Dressing Status Clean;Dry;Intact 04/09/24 0850   Dressing Intervention Integrity maintained 04/09/24 0850   Number of days: 0       Physical Examination:  General: Thin male, no acute distress  CV: RR  Resp: NWOB  GI:  Abdomen soft, mild TTP in lower abdomen, non-distended  :  Deferred  MSK: moving all extremities spontaneously  Skin/wounds:  No rashes, ulcers, erythema    Labs:  Renal:  Recent Labs     04/10/24  1348 04/11/24  0420   BUN 16.6 14.4   CREATININE 0.88 0.96       FENGI:  Recent Labs     04/10/24  1348 04/11/24  0420    138   K 4.4 3.8   CO2 26 25   CALCIUM 8.9 9.3   MG  --  1.70   ALBUMIN 3.5 3.7   BILITOT 1.0 1.0   AST 43* 40*   ALKPHOS 77 85   ALT 64* 63*     Heme:  Recent Labs     04/10/24  1348 04/11/24  0420   HGB 17.6 17.9   HCT 51.9 53.1*    197     ID:  Recent Labs     04/10/24  1348 04/11/24  0420   WBC 5.96 6.98     CBG:  Recent Labs     04/10/24  1348 04/11/24  0420   GLUCOSE 226* 142*        Imaging:  US Mesenteric Ischemia Study (xpd)   Final Result      Mildly elevated velocity at the celiac artery, of uncertain clinical significance.  There was mild calcific atherosclerosis at the origin of the celiac on CT exam without significant stenosis.         Electronically signed by: Yovana Landry   Date:    04/09/2024   Time:    15:44      NM Hepatobiliary Scan (HIDA)   Final Result      Negative for acute cholecystitis.         Electronically signed by: Pedro Garcia   Date:    04/09/2024   Time:    10:50      CT Abdomen Pelvis With IV Contrast NO Oral Contrast   Final Result      Edema and hyperemia of the distal stomach.  Gastritis is suspected.         Electronically signed by: Tai Valiente   Date:    04/08/2024   Time:    21:43       US Abdomen Limited   Final Result      Cholelithiasis with positive sonographic Jurado sign, but no gallbladder wall thickening or clear ascites.  Appearance is equivocal for early cholecystitis.  No biliary dilatation.         Electronically signed by: Ottoniel Michel   Date:    04/08/2024   Time:    16:23        Assessment & Plan:   68 y M w/ history of tobacco abuse, poorly controlled DM, HTN, Hepatosteatosis, and cholelithiasis presenting w chronic abdominal pain that has occurred for several months, more recently associated with watery diarrhea with each meal and significant unintentional weight loss. Lab workup reveals normal WBC, T bili, ALP, mild LFT elevation above baseline. US equivocal for early cholecystitis with no wall thickening or pericholecystic fluid.     - Plan for OR today for robotic cholecystectomy  - Consent obtained  - NPO, mIVF  - Rest of care per primary team    Doreen Lantigua MD  [facility] [place] PGY-1

## 2024-04-16 ENCOUNTER — ANESTHESIA EVENT (OUTPATIENT)
Dept: SURGERY | Facility: HOSPITAL | Age: 69
End: 2024-04-16
Payer: MEDICARE

## 2024-04-16 ENCOUNTER — OFFICE VISIT (OUTPATIENT)
Dept: SURGERY | Facility: CLINIC | Age: 69
End: 2024-04-16
Payer: MEDICARE

## 2024-04-16 ENCOUNTER — PATIENT OUTREACH (OUTPATIENT)
Dept: ADMINISTRATIVE | Facility: CLINIC | Age: 69
End: 2024-04-16
Payer: MEDICARE

## 2024-04-16 VITALS
DIASTOLIC BLOOD PRESSURE: 87 MMHG | OXYGEN SATURATION: 98 % | SYSTOLIC BLOOD PRESSURE: 139 MMHG | WEIGHT: 129 LBS | BODY MASS INDEX: 19.11 KG/M2 | RESPIRATION RATE: 18 BRPM | HEART RATE: 80 BPM | HEIGHT: 69 IN

## 2024-04-16 DIAGNOSIS — K80.20 CALCULUS OF GALLBLADDER WITHOUT CHOLECYSTITIS WITHOUT OBSTRUCTION: ICD-10-CM

## 2024-04-16 DIAGNOSIS — B19.20 HEPATITIS C VIRUS INFECTION WITHOUT HEPATIC COMA, UNSPECIFIED CHRONICITY: Primary | ICD-10-CM

## 2024-04-16 PROCEDURE — 99215 OFFICE O/P EST HI 40 MIN: CPT | Mod: PBBFAC

## 2024-04-16 RX ORDER — TRAMADOL HYDROCHLORIDE 50 MG/1
50 TABLET ORAL EVERY 6 HOURS PRN
Qty: 15 TABLET | Refills: 0 | Status: SHIPPED | OUTPATIENT
Start: 2024-04-16

## 2024-04-16 RX ORDER — METRONIDAZOLE 500 MG/100ML
500 INJECTION, SOLUTION INTRAVENOUS
Status: CANCELLED | OUTPATIENT
Start: 2024-04-16

## 2024-04-16 RX ORDER — SODIUM CHLORIDE 9 MG/ML
INJECTION, SOLUTION INTRAVENOUS CONTINUOUS
Status: CANCELLED | OUTPATIENT
Start: 2024-04-16

## 2024-04-16 NOTE — PROGRESS NOTES
Pt seen by Dr. Harmon. Pt will be scheduled for surgery on 04/22/2024. Consents signed at visit today. Patient will return to clinic 2 weeks post op.

## 2024-04-16 NOTE — PROGRESS NOTES
General Surgery Clinic Note      CC: Cholelithiasis     Subjective  Heath Holder is a 68 y.o. male with a PMH of hypertension and T2DM on metformin who presents to clinic with chronic RUQ abdominal pain starting six months ago. Patient notes the pain is intermittent. Currently endorses no pain. Patient also notes having watery diarrhea 3-4 times a day during this time. He also complains of excessive belching and flatus. Denies any nausea or vomiting.     Patient says he was recently seen at [facility] for similar presentation. While there, he had an ultrasound showing cholelithiasis and early signs of cholecystitis with no wall thickening or pericholecystic fluid. He had a HIDA scan which was negative for acute cholecystitis. He was meant to have a cholecystectomy during that hospital stay but his surgery was cancelled due to lack of OR availability. Patient agreed to follow up outpatient and reschedule surgery at a later time, which is what brings him to clinic today.      ROS  Pertinent symptoms noted in the HPI, otherwise negative.      PMHx:   Past Medical History:   Diagnosis Date    Chronic low back pain with left-sided sciatica     Diabetes mellitus     HTN (hypertension)      PSHx:   Past Surgical History:   Procedure Laterality Date    COLONOSCOPY N/A 4/10/2024    Procedure: COLON;  Surgeon: Ravi Petit MD;  Location: [facility] ENDOSCOPY;  Service: Gastroenterology;  Laterality: N/A;    EGD, WITH CLOSED BIOPSY  4/9/2024    Procedure: EGD, WITH CLOSED BIOPSY;  Surgeon: Ravi Petit MD;  Location: [facility] ENDOSCOPY;  Service: Gastroenterology;;    ESOPHAGOGASTRODUODENOSCOPY N/A 4/9/2024    Procedure: EGD;  Surgeon: Ravi Petit MD;  Location: [facility] ENDOSCOPY;  Service: Gastroenterology;  Laterality: N/A;    HIP SURGERY Left     ORIF HIP FRACTURE Right     >10 years     FHx:   Family History   Problem Relation Name Age of Onset    No Known Problems Mother      Diabetes Father       Meds:    Current Outpatient Medications on File Prior to Visit   Medication Sig Dispense Refill    amLODIPine (NORVASC) 10 MG tablet Take 1 tablet (10 mg total) by mouth once daily. 90 tablet 3    lisinopriL (PRINIVIL,ZESTRIL) 40 MG tablet Take 1 tablet (40 mg total) by mouth once daily. 90 tablet 1    metFORMIN (GLUCOPHAGE) 1000 MG tablet Take 1 tablet (1,000 mg total) by mouth 2 (two) times daily with meals. 180 tablet 1    pantoprazole (PROTONIX) 40 MG tablet Take 1 tablet (40 mg total) by mouth 2 (two) times daily. 60 tablet 0    sucralfate (CARAFATE) 1 gram tablet Take 1 tablet (1 g total) by mouth 4 (four) times daily. 120 tablet 3    HYDROcodone-acetaminophen (NORCO) 5-325 mg per tablet Take 1 tablet by mouth every 12 (twelve) hours as needed for Pain. (Patient not taking: Reported on 4/16/2024) 8 tablet 0     No current facility-administered medications on file prior to visit.     Social: Patient says he smokes 1/2 pack of cigarettes a day. Drinks 1-2 beers a day. Denies any recreational drug use.     Allergies: Review of patient's allergies indicates:  No Known Allergies     Objective  Vitals:    04/16/24 0939   BP: 139/87   Pulse: 80   Resp: 18        Gen: Pt in NAD, resting comfortably  CV: RRR  Resp: NWOB  Abd: Soft, non distended, epigastric and RUQ tenderness to palpation, no rebound or guarding     Labs  No new labs.      Imaging  US Abdominal Limited (4/8)    Impression:  Cholelithiasis with positive sonographic Jurado sign, but no gallbladder wall thickening or clear ascites.  Appearance is equivocal for early cholecystitis.  No biliary dilatation.      NM Hepatobiliary Scan (HIDA) (4/9)    Impression:  Negative for acute cholecystitis     Assessment/Plan  Heath Holder is a 68 y.o. male with a PMH of hypertension and T2DM on metformin who presents to clinic with symptomatic cholelithiasis. Ultrasound showed cholelithiasis with positive sonographic Jurado sign, but no gallbladder wall thickening or clear  ascites. HIDA scan negative for acute cholecystitis.     - Scheduled for laparoscopic vs open cholecystectomy on 4/22  - Consents signed      Rachelle Vogel MS3  [employer] [facility] [employer]      PGY3 Addendum:  I examined the patient w/ the MS3 and agree w/ his documentation above. My brief assessment follows:    Heath Holder is a 67 yo M w/ several months of RUQ/epigastric abdominal pain associated w/ anorexia, unintentional weight loss, and diarrhea. He recently presented to [facility] for these symptoms and was admitted there. He had an extensive workup, as outlined below:    WBC: WNL  Bili: WNL  Lipase: WNL  RUQ US: Gallstones w/o gallbladder wall thickening  HIDA: Negative for cholecystitis  CT: Gastric wall thickening  EGD: Gastritis; biopsies negative for dysplasia, malignancy, and H. pylori  Colonoscopy: Negative  Mesenteric Arterial US: Mildly elevated velocity at celiac artery of uncertain clinical significance; correlated w/ mils atherosclerosis on CT    After the above workup, the [facility] surgery team planned for inpatient cholecystectomy, but was unable to complete the procedure 2/2 issues w/ OR availability. The patient was discharged and referred here for eval for outpatient cholecystectomy.     Patient w/ PMH HTN, NIDDM (A1c 9). Denies h/o CAD/MI or CVA. Does not take any blood thinners. No prior abdominal surgeries.     Of note, the patient was dx'd w/ HCV during his hospitalization at [facility] (confirmed w/ PCR). He has a moderate viral load, but does not have clinical or laboratory evidence of fulminant hepatitis or clinical or imaging evidence of cirrhosis or hepatic decompensation. He is a Child-Melissa A, which confer a 10% 30d perioperative mortality risk and does not preclude him from undergoing elective surgery. At this point, given the patient's severity of symptoms, especially ongoing weight loss, I do not think the benefits of completing HCV tx prior to surgery outweigh the risks of delaying surgery. We will  refer to him to GI for HCV tx.     Will plan for surgery next Monday. R/B/A discussed and written informed consent obtained. Appropriate precautions will be taken intraoperatively in the setting of HCV infection. RTC 2 weeks for routine post-op f/u.     Alexis Scheuermann, MD   [facility] General Surgery, PGY5  04/16/2024 10:22 AM

## 2024-04-16 NOTE — PROGRESS NOTES
I have reviewed the notes, assessments, and/or procedures performed by the resident, I concur with her/his documentation of Heath Holder.     Demetrice Gardner MD

## 2024-04-16 NOTE — H&P (VIEW-ONLY)
General Surgery Clinic Note      CC: Cholelithiasis     Subjective  Heath Holder is a 68 y.o. male with a PMH of hypertension and T2DM on metformin who presents to clinic with chronic RUQ abdominal pain starting six months ago. Patient notes the pain is intermittent. Currently endorses no pain. Patient also notes having watery diarrhea 3-4 times a day during this time. He also complains of excessive belching and flatus. Denies any nausea or vomiting.     Patient says he was recently seen at [facility] for similar presentation. While there, he had an ultrasound showing cholelithiasis and early signs of cholecystitis with no wall thickening or pericholecystic fluid. He had a HIDA scan which was negative for acute cholecystitis. He was meant to have a cholecystectomy during that hospital stay but his surgery was cancelled due to lack of OR availability. Patient agreed to follow up outpatient and reschedule surgery at a later time, which is what brings him to clinic today.      ROS  Pertinent symptoms noted in the HPI, otherwise negative.      PMHx:   Past Medical History:   Diagnosis Date    Chronic low back pain with left-sided sciatica     Diabetes mellitus     HTN (hypertension)      PSHx:   Past Surgical History:   Procedure Laterality Date    COLONOSCOPY N/A 4/10/2024    Procedure: COLON;  Surgeon: Ravi Petit MD;  Location: [facility] ENDOSCOPY;  Service: Gastroenterology;  Laterality: N/A;    EGD, WITH CLOSED BIOPSY  4/9/2024    Procedure: EGD, WITH CLOSED BIOPSY;  Surgeon: Ravi Petit MD;  Location: [facility] ENDOSCOPY;  Service: Gastroenterology;;    ESOPHAGOGASTRODUODENOSCOPY N/A 4/9/2024    Procedure: EGD;  Surgeon: Ravi Petit MD;  Location: [facility] ENDOSCOPY;  Service: Gastroenterology;  Laterality: N/A;    HIP SURGERY Left     ORIF HIP FRACTURE Right     >10 years     FHx:   Family History   Problem Relation Name Age of Onset    No Known Problems Mother      Diabetes Father       Meds:    Current Outpatient Medications on File Prior to Visit   Medication Sig Dispense Refill    amLODIPine (NORVASC) 10 MG tablet Take 1 tablet (10 mg total) by mouth once daily. 90 tablet 3    lisinopriL (PRINIVIL,ZESTRIL) 40 MG tablet Take 1 tablet (40 mg total) by mouth once daily. 90 tablet 1    metFORMIN (GLUCOPHAGE) 1000 MG tablet Take 1 tablet (1,000 mg total) by mouth 2 (two) times daily with meals. 180 tablet 1    pantoprazole (PROTONIX) 40 MG tablet Take 1 tablet (40 mg total) by mouth 2 (two) times daily. 60 tablet 0    sucralfate (CARAFATE) 1 gram tablet Take 1 tablet (1 g total) by mouth 4 (four) times daily. 120 tablet 3    HYDROcodone-acetaminophen (NORCO) 5-325 mg per tablet Take 1 tablet by mouth every 12 (twelve) hours as needed for Pain. (Patient not taking: Reported on 4/16/2024) 8 tablet 0     No current facility-administered medications on file prior to visit.     Social: Patient says he smokes 1/2 pack of cigarettes a day. Drinks 1-2 beers a day. Denies any recreational drug use.     Allergies: Review of patient's allergies indicates:  No Known Allergies     Objective  Vitals:    04/16/24 0939   BP: 139/87   Pulse: 80   Resp: 18        Gen: Pt in NAD, resting comfortably  CV: RRR  Resp: NWOB  Abd: Soft, non distended, epigastric and RUQ tenderness to palpation, no rebound or guarding     Labs  No new labs.      Imaging  US Abdominal Limited (4/8)    Impression:  Cholelithiasis with positive sonographic Jurado sign, but no gallbladder wall thickening or clear ascites.  Appearance is equivocal for early cholecystitis.  No biliary dilatation.      NM Hepatobiliary Scan (HIDA) (4/9)    Impression:  Negative for acute cholecystitis     Assessment/Plan  Heath Holder is a 68 y.o. male with a PMH of hypertension and T2DM on metformin who presents to clinic with symptomatic cholelithiasis. Ultrasound showed cholelithiasis with positive sonographic Jurado sign, but no gallbladder wall thickening or clear  ascites. HIDA scan negative for acute cholecystitis.     - Scheduled for laparoscopic vs open cholecystectomy on 4/22  - Consents signed      Rachelle Vogel MS3  [employer]      PGY3 Addendum:  I examined the patient w/ the MS3 and agree w/ his documentation above. My brief assessment follows:    Heath Holder is a 67 yo M w/ several months of RUQ/epigastric abdominal pain associated w/ anorexia, unintentional weight loss, and diarrhea. He recently presented to [facility] for these symptoms and was admitted there. He had an extensive workup, as outlined below:    WBC: WNL  Bili: WNL  Lipase: WNL  RUQ US: Gallstones w/o gallbladder wall thickening  HIDA: Negative for cholecystitis  CT: Gastric wall thickening  EGD: Gastritis; biopsies negative for dysplasia, malignancy, and H. pylori  Colonoscopy: Negative  Mesenteric Arterial US: Mildly elevated velocity at celiac artery of uncertain clinical significance; correlated w/ mils atherosclerosis on CT    After the above workup, the [facility] surgery team planned for inpatient cholecystectomy, but was unable to complete the procedure 2/2 issues w/ OR availability. The patient was discharged and referred here for eval for outpatient cholecystectomy.     Patient w/ PMH HTN, NIDDM (A1c 9). Denies h/o CAD/MI or CVA. Does not take any blood thinners. No prior abdominal surgeries.     Of note, the patient was dx'd w/ HCV during his hospitalization at [facility] (confirmed w/ PCR). He has a moderate viral load, but does not have clinical or laboratory evidence of fulminant hepatitis or clinical or imaging evidence of cirrhosis or hepatic decompensation. He is a Child-Melissa A, which confer a 10% 30d perioperative mortality risk and does not preclude him from undergoing elective surgery. At this point, given the patient's severity of symptoms, especially ongoing weight loss, I do not think the benefits of completing HCV tx prior to surgery outweigh the risks of delaying surgery. We will  refer to him to GI for HCV tx.     Will plan for surgery next Monday. R/B/A discussed and written informed consent obtained. Appropriate precautions will be taken intraoperatively in the setting of HCV infection. RTC 2 weeks for routine post-op f/u.     Alexis Scheuermann, MD   [facility] General Surgery, PGY5  04/16/2024 10:22 AM

## 2024-04-16 NOTE — PROGRESS NOTES
General Surgery Clinic Note      CC: Cholelithiasis     Subjective  Heath Holder is a 68 y.o. male with a PMH of hypertension and T2DM on metformin who presents to clinic with chronic RUQ abdominal pain starting six months ago. Patient notes the pain is intermittent. Currently endorses no pain. Patient also notes having watery diarrhea 3-4 times a day during this time. He also complains of excessive belching and flatus. Denies any nausea or vomiting.     Patient says he was recently seen at [facility] for similar presentation. While there, he had an ultrasound showing cholelithiasis and early signs of cholecystitis with no wall thickening or pericholecystic fluid. He had a HIDA scan which was negative for acute cholecystitis. He was meant to have a cholecystectomy during that hospital stay but his surgery was cancelled due to lack of OR availability. Patient agreed to follow up outpatient and reschedule surgery at a later time, which is what brings him to clinic today.      ROS  Pertinent symptoms noted in the HPI, otherwise negative.      PMHx:   Past Medical History:   Diagnosis Date    Chronic low back pain with left-sided sciatica     Diabetes mellitus     HTN (hypertension)      PSHx:   Past Surgical History:   Procedure Laterality Date    COLONOSCOPY N/A 4/10/2024    Procedure: COLON;  Surgeon: Ravi Petit MD;  Location: [facility] ENDOSCOPY;  Service: Gastroenterology;  Laterality: N/A;    EGD, WITH CLOSED BIOPSY  4/9/2024    Procedure: EGD, WITH CLOSED BIOPSY;  Surgeon: Ravi Petit MD;  Location: [facility] ENDOSCOPY;  Service: Gastroenterology;;    ESOPHAGOGASTRODUODENOSCOPY N/A 4/9/2024    Procedure: EGD;  Surgeon: Ravi Petit MD;  Location: [facility] ENDOSCOPY;  Service: Gastroenterology;  Laterality: N/A;    HIP SURGERY Left     ORIF HIP FRACTURE Right     >10 years     FHx:   Family History   Problem Relation Name Age of Onset    No Known Problems Mother      Diabetes Father       Meds:    Current Outpatient Medications on File Prior to Visit   Medication Sig Dispense Refill    amLODIPine (NORVASC) 10 MG tablet Take 1 tablet (10 mg total) by mouth once daily. 90 tablet 3    lisinopriL (PRINIVIL,ZESTRIL) 40 MG tablet Take 1 tablet (40 mg total) by mouth once daily. 90 tablet 1    metFORMIN (GLUCOPHAGE) 1000 MG tablet Take 1 tablet (1,000 mg total) by mouth 2 (two) times daily with meals. 180 tablet 1    pantoprazole (PROTONIX) 40 MG tablet Take 1 tablet (40 mg total) by mouth 2 (two) times daily. 60 tablet 0    sucralfate (CARAFATE) 1 gram tablet Take 1 tablet (1 g total) by mouth 4 (four) times daily. 120 tablet 3    HYDROcodone-acetaminophen (NORCO) 5-325 mg per tablet Take 1 tablet by mouth every 12 (twelve) hours as needed for Pain. (Patient not taking: Reported on 4/16/2024) 8 tablet 0     No current facility-administered medications on file prior to visit.     Social: Patient says he smokes 1/2 pack of cigarettes a day. Drinks 1-2 beers a day. Denies any recreational drug use.     Allergies: Review of patient's allergies indicates:  No Known Allergies     Objective  Vitals:    04/16/24 0939   BP: 139/87   Pulse: 80   Resp: 18        Gen: Pt in NAD, resting comfortably  CV: RRR  Resp: NWOB  Abd: Soft, non distended, epigastric and RUQ tenderness to palpation, no rebound or guarding     Labs  No new labs.      Imaging  US Abdominal Limited (4/8)    Impression:  Cholelithiasis with positive sonographic Jurado sign, but no gallbladder wall thickening or clear ascites.  Appearance is equivocal for early cholecystitis.  No biliary dilatation.      NM Hepatobiliary Scan (HIDA) (4/9)    Impression:  Negative for acute cholecystitis     Assessment/Plan  Heath Holder is a 68 y.o. male with a PMH of hypertension and T2DM on metformin who presents to clinic with symptomatic cholelithiasis. Ultrasound showed cholelithiasis with positive sonographic Jurado sign, but no gallbladder wall thickening or clear  ascites. HIDA scan negative for acute cholecystitis.     - Scheduled for laparoscopic vs open cholecystectomy on 4/22  - Consents signed      Rachelle Vogel MS3  [facility] [employer]      PGY3 Addendum:  I examined the patient w/ the MS3 and agree w/ his documentation above. My brief assessment follows:    Heath Holder is a 69 yo M w/ several months of RUQ/epigastric abdominal pain associated w/ anorexia, unintentional weight loss, and diarrhea. He recently presented to [facility] for these symptoms and was admitted there. He had an extensive workup, as outlined below:    WBC: WNL  Bili: WNL  Lipase: WNL  RUQ US: Gallstones w/o gallbladder wall thickening  HIDA: Negative for cholecystitis  CT: Gastric wall thickening  EGD: Gastritis; biopsies negative for dysplasia, malignancy, and H. pylori  Colonoscopy: Negative  Mesenteric Arterial US: Mildly elevated velocity at celiac artery of uncertain clinical significance; correlated w/ mils atherosclerosis on CT    After the above workup, the [facility] surgery team planned for inpatient cholecystectomy, but was unable to complete the procedure 2/2 issues w/ OR availability. The patient was discharged and referred here for eval for outpatient cholecystectomy.     Patient w/ PMH HTN, NIDDM (A1c 9). Denies h/o CAD/MI or CVA. Does not take any blood thinners. No prior abdominal surgeries.     Of note, the patient was dx'd w/ HCV during his hospitalization at [facility] (confirmed w/ PCR). He has a moderate viral load, but does not have clinical or laboratory evidence of fulminant hepatitis or clinical or imaging evidence of cirrhosis or hepatic decompensation. He is a Child-Melissa A, which confer a 10% 30d perioperative mortality risk and does not preclude him from undergoing elective surgery. At this point, given the patient's severity of symptoms, especially ongoing weight loss, I do not think the benefits of completing HCV tx prior to surgery outweigh the risks of delaying surgery. We will  refer to him to GI for HCV tx.     Will plan for surgery next Monday. R/B/A discussed and written informed consent obtained. Appropriate precautions will be taken intraoperatively in the setting of HCV infection. RTC 2 weeks for routine post-op f/u.     Alexis Scheuermann, MD   [facility] General Surgery, PGY5  04/16/2024 10:22 AM

## 2024-04-16 NOTE — ANESTHESIA PREPROCEDURE EVALUATION
Heath Holder is a 68 y.o. male PRESENTING FOR CHOLECYSTECTOMY, LAPAROSCOPIC (Abdomen) with a history of   -GALLSTONES  -FATTY LIVER  -HEPATITIS C, ACTIVE  -HTN  -DM (A1C 9.3); AM CBG__  -CONSTIPATION  -CHRONIC LBP  -SMOKER X 26 Y    BETA-BLOCKER: NONE    New Orders for Anesthesia: DM PROTOCOL    Patient Active Problem List   Diagnosis    HTN (hypertension)    Abnormal CBC    Hyperglycemia    Elevated liver enzymes    Well adult exam    Controlled type 2 diabetes mellitus without complication, without long-term current use of insulin    Chronic back pain    Prostate cancer screening    Uncontrolled type 2 diabetes mellitus with hyperglycemia    Acute calculous cholecystitis    Constipation with overflow    Severe malnutrition       Pre-op Assessment    I have reviewed the NPO Status.      Review of Systems  Anesthesia Hx:  No problems with previous Anesthesia                Social:  Smoker       Cardiovascular:     Hypertension, poorly controlled                                        Pulmonary:  Pulmonary Normal                       Renal/:  Renal/ Normal                 Hepatic/GI:      Liver Disease, Hepatitis, C           Neurological:  Neurology Normal                                      Endocrine:  Diabetes, poorly controlled, type 2             Vitals:    04/22/24 0802 04/22/24 0807 04/22/24 0811 04/22/24 0824   BP: (!) 160/93 (!) 164/91 (!) 160/89 (!) 160/86   Pulse:    88   Resp:    20   Temp:    36.1 °C (97 °F)   TempSrc:    Temporal   SpO2:    100%   Weight:             Physical Exam  General: Alert, Cooperative and Well nourished    Airway:  Mallampati: II   Mouth Opening: Normal  TM Distance: Normal  Tongue: Normal  Neck ROM: Normal ROM    Dental:  Intact    Chest/Lungs:  Normal Respiratory Rate, Clear to auscultation    Heart:  Rate: Normal  Rhythm: Regular Rhythm  Sounds: Normal       Latest Reference Range & Units 04/22/24 08:36   POCT Glucose 70 - 110 mg/dL 151 (H)   (H): Data is abnormally  high  Lab Results   Component Value Date    WBC 6.98 04/11/2024    HGB 17.9 04/11/2024    HCT 53.1 (H) 04/11/2024    MCV 88.9 04/11/2024     04/11/2024       CMP  Sodium Level   Date Value Ref Range Status   04/11/2024 138 136 - 145 mmol/L Final     Potassium Level   Date Value Ref Range Status   04/11/2024 3.8 3.5 - 5.1 mmol/L Final     Carbon Dioxide   Date Value Ref Range Status   04/11/2024 25 23 - 31 mmol/L Final     Blood Urea Nitrogen   Date Value Ref Range Status   04/11/2024 14.4 8.4 - 25.7 mg/dL Final     Creatinine   Date Value Ref Range Status   04/11/2024 0.96 0.73 - 1.18 mg/dL Final     Calcium Level Total   Date Value Ref Range Status   04/11/2024 9.3 8.8 - 10.0 mg/dL Final     Albumin Level   Date Value Ref Range Status   04/11/2024 3.7 3.4 - 4.8 g/dL Final     Bilirubin Total   Date Value Ref Range Status   04/11/2024 1.0 <=1.5 mg/dL Final     Alkaline Phosphatase   Date Value Ref Range Status   04/11/2024 85 40 - 150 unit/L Final     Aspartate Aminotransferase   Date Value Ref Range Status   04/11/2024 40 (H) 5 - 34 unit/L Final     Alanine Aminotransferase   Date Value Ref Range Status   04/11/2024 63 (H) 0 - 55 unit/L Final     eGFR   Date Value Ref Range Status   04/11/2024 >60 mls/min/1.73/m2 Final     Lab Results   Component Value Date    INR 0.9 10/06/2022    PROTIME 12.1 10/06/2022     Lab Results   Component Value Date    HGBA1C 9.3 (H) 03/18/2024     EKG 10/6/22    Past anesthesia records 4/10/24:         Anesthesia Plan  Type of Anesthesia, risks & benefits discussed:    Anesthesia Type: Gen ETT  Intra-op Monitoring Plan: Standard ASA Monitors  Post Op Pain Control Plan: IV/PO Opioids PRN  Induction:  IV  Airway Plan: Direct  Informed Consent: Informed consent signed with the Patient and all parties understand the risks and agree with anesthesia plan.  All questions answered.   ASA Score: 3  Day of Surgery Review of History & Physical: H&P Update referred to the  surgeon/provider.    Ready For Surgery From Anesthesia Perspective.     .

## 2024-04-16 NOTE — PROGRESS NOTES
C3 nurse spoke with patient's wife and son Alberto for a TCC post hospital discharge follow up call. The patient has a scheduled [facility] appointment with Marilu Amezquita FNP  on 04/18/2024 @ 220 pm. Patient had an appointment today, 04/16/2024 @ [facility] and scheduled for Lap Cholecystectomy on Monday 04/22/2024.

## 2024-04-18 DIAGNOSIS — E11.9 CONTROLLED TYPE 2 DIABETES MELLITUS WITHOUT COMPLICATION, WITHOUT LONG-TERM CURRENT USE OF INSULIN: Primary | ICD-10-CM

## 2024-04-19 ENCOUNTER — TELEPHONE (OUTPATIENT)
Dept: INFECTIOUS DISEASES | Facility: CLINIC | Age: 69
End: 2024-04-19
Payer: MEDICARE

## 2024-04-19 ENCOUNTER — PATIENT MESSAGE (OUTPATIENT)
Dept: SURGERY | Facility: HOSPITAL | Age: 69
End: 2024-04-19
Payer: MEDICARE

## 2024-04-19 DIAGNOSIS — B18.2 CHRONIC HEPATITIS C WITHOUT HEPATIC COMA: Primary | ICD-10-CM

## 2024-04-19 NOTE — TELEPHONE ENCOUNTER
Spoke with patient informed of the need for additional labs and that the orders have been placed and he can have the labs drawn in [place].

## 2024-04-19 NOTE — TELEPHONE ENCOUNTER
----- Message from Citlali Spencer LPN sent at 4/18/2024  2:07 PM CDT -----  Regarding: FW: New Hep C Labs    ----- Message -----  From: Sonya Sánchez  Sent: 4/18/2024  12:22 PM CDT  To: #  Subject: New Hep C Labs                                   DAKOTA PETTY HEP C PT - RENAE       Pt is scheduled for a New Hep C appt on 5/7/24 with Dakota.

## 2024-04-22 ENCOUNTER — HOSPITAL ENCOUNTER (OUTPATIENT)
Facility: HOSPITAL | Age: 69
Discharge: HOME | End: 2024-04-22
Attending: SURGERY | Admitting: SURGERY
Payer: MEDICARE

## 2024-04-22 ENCOUNTER — ANESTHESIA (OUTPATIENT)
Dept: SURGERY | Facility: HOSPITAL | Age: 69
End: 2024-04-22
Payer: MEDICARE

## 2024-04-22 DIAGNOSIS — K80.20 CALCULUS OF GALLBLADDER WITHOUT CHOLECYSTITIS WITHOUT OBSTRUCTION: ICD-10-CM

## 2024-04-22 LAB
POCT GLUCOSE: 131 MG/DL (ref 70–110)
POCT GLUCOSE: 151 MG/DL (ref 70–110)
POCT GLUCOSE: 185 MG/DL (ref 70–110)

## 2024-04-22 PROCEDURE — 25000003 PHARM REV CODE 250: Performed by: ANESTHESIOLOGY

## 2024-04-22 PROCEDURE — 82962 GLUCOSE BLOOD TEST: CPT | Mod: 91 | Performed by: SURGERY

## 2024-04-22 PROCEDURE — 71000015 HC POSTOP RECOV 1ST HR: Performed by: SURGERY

## 2024-04-22 PROCEDURE — 63600175 PHARM REV CODE 636 W HCPCS: Performed by: ANESTHESIOLOGY

## 2024-04-22 PROCEDURE — 71000016 HC POSTOP RECOV ADDL HR: Performed by: SURGERY

## 2024-04-22 PROCEDURE — 37000008 HC ANESTHESIA 1ST 15 MINUTES: Performed by: SURGERY

## 2024-04-22 PROCEDURE — 63600175 PHARM REV CODE 636 W HCPCS: Performed by: NURSE ANESTHETIST, CERTIFIED REGISTERED

## 2024-04-22 PROCEDURE — D9220A PRA ANESTHESIA: Mod: CRNA,,, | Performed by: NURSE ANESTHETIST, CERTIFIED REGISTERED

## 2024-04-22 PROCEDURE — 63600175 PHARM REV CODE 636 W HCPCS

## 2024-04-22 PROCEDURE — 36000708 HC OR TIME LEV III 1ST 15 MIN: Performed by: SURGERY

## 2024-04-22 PROCEDURE — 25000003 PHARM REV CODE 250: Performed by: NURSE ANESTHETIST, CERTIFIED REGISTERED

## 2024-04-22 PROCEDURE — D9220A PRA ANESTHESIA: Mod: ANES,,, | Performed by: ANESTHESIOLOGY

## 2024-04-22 PROCEDURE — 27201423 OPTIME MED/SURG SUP & DEVICES STERILE SUPPLY: Performed by: SURGERY

## 2024-04-22 PROCEDURE — 36000709 HC OR TIME LEV III EA ADD 15 MIN: Performed by: SURGERY

## 2024-04-22 PROCEDURE — 88304 TISSUE EXAM BY PATHOLOGIST: CPT | Mod: TC | Performed by: SURGERY

## 2024-04-22 PROCEDURE — 37000009 HC ANESTHESIA EA ADD 15 MINS: Performed by: SURGERY

## 2024-04-22 PROCEDURE — 82962 GLUCOSE BLOOD TEST: CPT | Performed by: SURGERY

## 2024-04-22 PROCEDURE — 25000003 PHARM REV CODE 250: Performed by: SURGERY

## 2024-04-22 PROCEDURE — 63600175 PHARM REV CODE 636 W HCPCS: Performed by: SURGERY

## 2024-04-22 PROCEDURE — 71000033 HC RECOVERY, INTIAL HOUR: Performed by: SURGERY

## 2024-04-22 RX ORDER — KETOROLAC TROMETHAMINE 30 MG/ML
INJECTION, SOLUTION INTRAMUSCULAR; INTRAVENOUS
Status: DISCONTINUED | OUTPATIENT
Start: 2024-04-22 | End: 2024-04-22

## 2024-04-22 RX ORDER — HEPARIN SODIUM 5000 [USP'U]/ML
5000 INJECTION, SOLUTION INTRAVENOUS; SUBCUTANEOUS ONCE
Status: COMPLETED | OUTPATIENT
Start: 2024-04-22 | End: 2024-04-22

## 2024-04-22 RX ORDER — LIDOCAINE HYDROCHLORIDE 20 MG/ML
INJECTION, SOLUTION EPIDURAL; INFILTRATION; INTRACAUDAL; PERINEURAL
Status: DISCONTINUED | OUTPATIENT
Start: 2024-04-22 | End: 2024-04-22

## 2024-04-22 RX ORDER — PROPOFOL 10 MG/ML
VIAL (ML) INTRAVENOUS
Status: DISCONTINUED | OUTPATIENT
Start: 2024-04-22 | End: 2024-04-22

## 2024-04-22 RX ORDER — ONDANSETRON HYDROCHLORIDE 2 MG/ML
4 INJECTION, SOLUTION INTRAVENOUS DAILY PRN
Status: DISCONTINUED | OUTPATIENT
Start: 2024-04-22 | End: 2024-04-22 | Stop reason: HOSPADM

## 2024-04-22 RX ORDER — ROCURONIUM BROMIDE 10 MG/ML
INJECTION, SOLUTION INTRAVENOUS
Status: DISCONTINUED | OUTPATIENT
Start: 2024-04-22 | End: 2024-04-22

## 2024-04-22 RX ORDER — GLUCAGON 1 MG
1 KIT INJECTION
Status: ACTIVE | OUTPATIENT
Start: 2024-04-22

## 2024-04-22 RX ORDER — DEXAMETHASONE SODIUM PHOSPHATE 4 MG/ML
INJECTION, SOLUTION INTRA-ARTICULAR; INTRALESIONAL; INTRAMUSCULAR; INTRAVENOUS; SOFT TISSUE
Status: DISCONTINUED | OUTPATIENT
Start: 2024-04-22 | End: 2024-04-22

## 2024-04-22 RX ORDER — OXYCODONE HYDROCHLORIDE 5 MG/1
5 TABLET ORAL
Status: DISCONTINUED | OUTPATIENT
Start: 2024-04-22 | End: 2024-04-22 | Stop reason: HOSPADM

## 2024-04-22 RX ORDER — LIDOCAINE HYDROCHLORIDE 10 MG/ML
INJECTION, SOLUTION EPIDURAL; INFILTRATION; INTRACAUDAL; PERINEURAL
Status: DISCONTINUED | OUTPATIENT
Start: 2024-04-22 | End: 2024-04-22 | Stop reason: HOSPADM

## 2024-04-22 RX ORDER — SODIUM CHLORIDE, SODIUM LACTATE, POTASSIUM CHLORIDE, CALCIUM CHLORIDE 600; 310; 30; 20 MG/100ML; MG/100ML; MG/100ML; MG/100ML
INJECTION, SOLUTION INTRAVENOUS CONTINUOUS
Status: DISCONTINUED | OUTPATIENT
Start: 2024-04-22 | End: 2024-04-22 | Stop reason: HOSPADM

## 2024-04-22 RX ORDER — METRONIDAZOLE 500 MG/100ML
500 INJECTION, SOLUTION INTRAVENOUS
Status: DISCONTINUED | OUTPATIENT
Start: 2024-04-22 | End: 2024-04-22 | Stop reason: HOSPADM

## 2024-04-22 RX ORDER — FENTANYL CITRATE 50 UG/ML
INJECTION, SOLUTION INTRAMUSCULAR; INTRAVENOUS
Status: DISCONTINUED | OUTPATIENT
Start: 2024-04-22 | End: 2024-04-22

## 2024-04-22 RX ORDER — MEPERIDINE HYDROCHLORIDE 25 MG/ML
12.5 INJECTION INTRAMUSCULAR; INTRAVENOUS; SUBCUTANEOUS EVERY 10 MIN PRN
Status: DISCONTINUED | OUTPATIENT
Start: 2024-04-22 | End: 2024-04-22 | Stop reason: HOSPADM

## 2024-04-22 RX ORDER — PHENYLEPHRINE HYDROCHLORIDE 10 MG/ML
INJECTION INTRAVENOUS
Status: DISCONTINUED | OUTPATIENT
Start: 2024-04-22 | End: 2024-04-22

## 2024-04-22 RX ORDER — INSULIN ASPART 100 [IU]/ML
0-5 INJECTION, SOLUTION INTRAVENOUS; SUBCUTANEOUS ONCE AS NEEDED
Status: DISCONTINUED | OUTPATIENT
Start: 2024-04-22 | End: 2024-04-26

## 2024-04-22 RX ORDER — SODIUM CHLORIDE 9 MG/ML
INJECTION, SOLUTION INTRAVENOUS CONTINUOUS
Status: DISCONTINUED | OUTPATIENT
Start: 2024-04-22 | End: 2024-04-22 | Stop reason: HOSPADM

## 2024-04-22 RX ORDER — CEFAZOLIN 2 G/1
INJECTION, POWDER, FOR SOLUTION INTRAMUSCULAR; INTRAVENOUS
Status: DISCONTINUED | OUTPATIENT
Start: 2024-04-22 | End: 2024-04-22

## 2024-04-22 RX ORDER — SUCCINYLCHOLINE CHLORIDE 20 MG/ML
INJECTION INTRAMUSCULAR; INTRAVENOUS
Status: DISCONTINUED | OUTPATIENT
Start: 2024-04-22 | End: 2024-04-22

## 2024-04-22 RX ORDER — HYDROCODONE BITARTRATE AND ACETAMINOPHEN 5; 325 MG/1; MG/1
1 TABLET ORAL EVERY 6 HOURS PRN
Qty: 15 TABLET | Refills: 0 | Status: SHIPPED | OUTPATIENT
Start: 2024-04-22

## 2024-04-22 RX ORDER — MIDAZOLAM HYDROCHLORIDE 2 MG/2ML
2 INJECTION, SOLUTION INTRAMUSCULAR; INTRAVENOUS ONCE AS NEEDED
Status: COMPLETED | OUTPATIENT
Start: 2024-04-22 | End: 2024-04-22

## 2024-04-22 RX ORDER — ONDANSETRON HYDROCHLORIDE 2 MG/ML
INJECTION, SOLUTION INTRAMUSCULAR; INTRAVENOUS
Status: DISCONTINUED | OUTPATIENT
Start: 2024-04-22 | End: 2024-04-22

## 2024-04-22 RX ORDER — HYDRALAZINE HYDROCHLORIDE 20 MG/ML
10 INJECTION INTRAMUSCULAR; INTRAVENOUS ONCE
Status: COMPLETED | OUTPATIENT
Start: 2024-04-22 | End: 2024-04-22

## 2024-04-22 RX ORDER — SODIUM CHLORIDE 0.9 % (FLUSH) 0.9 %
10 SYRINGE (ML) INJECTION
Status: DISCONTINUED | OUTPATIENT
Start: 2024-04-22 | End: 2024-04-22 | Stop reason: HOSPADM

## 2024-04-22 RX ORDER — MORPHINE SULFATE 2 MG/ML
2 INJECTION, SOLUTION INTRAMUSCULAR; INTRAVENOUS EVERY 5 MIN PRN
Status: DISCONTINUED | OUTPATIENT
Start: 2024-04-22 | End: 2024-04-22 | Stop reason: HOSPADM

## 2024-04-22 RX ADMIN — KETOROLAC TROMETHAMINE 30 MG: 30 INJECTION, SOLUTION INTRAMUSCULAR at 09:04

## 2024-04-22 RX ADMIN — LIDOCAINE HYDROCHLORIDE 50 MG: 20 INJECTION, SOLUTION EPIDURAL; INFILTRATION; INTRACAUDAL; PERINEURAL at 09:04

## 2024-04-22 RX ADMIN — HEPARIN SODIUM 5000 UNITS: 5000 INJECTION, SOLUTION INTRAVENOUS; SUBCUTANEOUS at 06:04

## 2024-04-22 RX ADMIN — SUGAMMADEX 200 MG: 100 INJECTION, SOLUTION INTRAVENOUS at 10:04

## 2024-04-22 RX ADMIN — OXYCODONE HYDROCHLORIDE 5 MG: 5 TABLET ORAL at 11:04

## 2024-04-22 RX ADMIN — SUCCINYLCHOLINE CHLORIDE 100 MG: 20 INJECTION, SOLUTION INTRAMUSCULAR; INTRAVENOUS; PARENTERAL at 09:04

## 2024-04-22 RX ADMIN — DEXAMETHASONE SODIUM PHOSPHATE 8 MG: 4 INJECTION, SOLUTION INTRA-ARTICULAR; INTRALESIONAL; INTRAMUSCULAR; INTRAVENOUS; SOFT TISSUE at 09:04

## 2024-04-22 RX ADMIN — SODIUM CHLORIDE, POTASSIUM CHLORIDE, SODIUM LACTATE AND CALCIUM CHLORIDE: 600; 310; 30; 20 INJECTION, SOLUTION INTRAVENOUS at 08:04

## 2024-04-22 RX ADMIN — CEFAZOLIN 2 EACH: 2 INJECTION, POWDER, FOR SOLUTION INTRAMUSCULAR; INTRAVENOUS at 09:04

## 2024-04-22 RX ADMIN — ONDANSETRON 4 MG: 2 INJECTION INTRAMUSCULAR; INTRAVENOUS at 09:04

## 2024-04-22 RX ADMIN — FENTANYL CITRATE 100 MCG: 50 INJECTION, SOLUTION INTRAMUSCULAR; INTRAVENOUS at 09:04

## 2024-04-22 RX ADMIN — HYDRALAZINE HYDROCHLORIDE 10 MG: 20 INJECTION INTRAMUSCULAR; INTRAVENOUS at 07:04

## 2024-04-22 RX ADMIN — PROPOFOL 200 MG: 10 INJECTION, EMULSION INTRAVENOUS at 09:04

## 2024-04-22 RX ADMIN — ROCURONIUM BROMIDE 30 MG: 10 INJECTION INTRAVENOUS at 09:04

## 2024-04-22 RX ADMIN — MIDAZOLAM HYDROCHLORIDE 2 MG: 1 INJECTION, SOLUTION INTRAMUSCULAR; INTRAVENOUS at 08:04

## 2024-04-22 RX ADMIN — PHENYLEPHRINE HYDROCHLORIDE 200 MCG: 10 INJECTION INTRAVENOUS at 10:04

## 2024-04-22 NOTE — TRANSFER OF CARE
Anesthesia Transfer of Care Note    Patient: Heath Holder    Procedure(s) Performed: Procedure(s) (LRB):  CHOLECYSTECTOMY, LAPAROSCOPIC (N/A)    Patient location: PACU    Anesthesia Type: general    Post pain: adequate analgesia    Post assessment: no apparent anesthetic complications    Post vital signs: stable    Level of consciousness: awake    Nausea/Vomiting: no nausea/vomiting    Complications: none    Transfer of care protocol was followed      Last vitals: Visit Vitals  BP (!) 160/86   Pulse 88   Temp 36.1 °C (97 °F) (Temporal)   Resp 20   Wt 57.6 kg (127 lb)   SpO2 100%   BMI 18.75 kg/m²

## 2024-04-22 NOTE — DISCHARGE INSTRUCTIONS
PLEASE KEEP THESE POST OP INSTRUCTIONS WITH YOU UNTIL YOUR FOLLOW-UP APPOINTMENT    FOLLOW UP:  [facility]- see appointment listed on first page.    PAIN:    Apply ice pack to abdomen as needed for pain.    Alternate Tylenol and Ibuprofen (regular over the counter- follow instructions on the bottle) OR NORCO and Ibuprofen.    Do not take Tylenol (acetaminophen) with your NORCO since NORCO contains Tylenol as well.       CONTACT THE SURGEONS OFFICE IF YOUR PAIN MEDICATIONS ARE NOT PROVIDING ADEQUATE PAIN RELIEF.    EXAMPLE:   Take Tylenol.  Three hours later take Ibuprofen.  Three hours after that take Tylenol again, and repeat until no longer needed.  If Pain is still bad once you start Tylenol and Ibuprofen, SUBSTITUTE NORCO for Tylenol.  Dont drive or drink alcohol with pain medication.  Dont take pain medication more often than it is prescribed to be taken.     INFECTION:    Call your doctor at [phone] or report to the emergency room:     if redness around your incision begins to spread, or you have swelling.       If your incision has opened up     If you have green, yellow, or cottage cheese-like drainage coming from your incision     If you begin to run fever over 100.4 F     if you are feeling weaker     INCISION (WOUND) CARE:  Leave adhesive glue on your skin until the glue peels away on its own.  You may take a shower tomorrow.  Wash gently over incision site - do not scrub or peel skin glue.  Do not soak your wounds in water for 2 weeks.  Do not take baths, swim, or use a hot tub until your doctor says it is okay.       NAUSEA:  You can eat and drink whatever you like as tolerated, it is suggested to avoid fatty foods initially.  You may experience post anesthesia nausea.  Apply cold cloths to your face and neck and call your doctor for a prescription for nausea medication.  If you have any uncontrolled vomiting that is not resolving within a few hours, report to your emergency room.   Resume  all medications tomorrow.       ACTIVITY:  Do not lift anything that is heavier than 10-15  lbs. for 2-3 weeks.  Do not drink alcohol or drive today, or as long as you are on pain medication.       Notify MD of any moderate to severe pain unrelieved by pain medicine or for any signs of infection including fever above 100.4, excessive redness or swelling, yellow/green foul- smelling drainage, nausea or vomiting.  Clinics number is [phone]. If it is after business hours or emergency call [phone] and state Im having post op complications and need to speak to the surgeon on call.

## 2024-04-22 NOTE — ANESTHESIA POSTPROCEDURE EVALUATION
Anesthesia Post Evaluation    Patient: Heath Holder    Procedure(s) Performed: Procedure(s) (LRB):  CHOLECYSTECTOMY, LAPAROSCOPIC (N/A)    Final Anesthesia Type: general      Patient location during evaluation: DOSC  Post-procedure vital signs: reviewed and stable  Airway patency: patent      Anesthetic complications: no      Cardiovascular status: hemodynamically stable  Respiratory status: spontaneous ventilation  Follow-up not needed.              Vitals Value Taken Time   /92 04/22/24 1216   Temp 37.1 °C (98.7 °F) 04/22/24 1121   Pulse 79 04/22/24 1218   Resp 16 04/22/24 1149   SpO2 94 % 04/22/24 1218   Vitals shown include unfiled device data.      Event Time   Out of Recovery 11:40:00         Pain/Marci Score: Pain Rating Prior to Med Admin: 7 (4/22/2024 11:49 AM)  Marci Score: 9 (4/22/2024 11:27 AM)

## 2024-04-22 NOTE — INTERVAL H&P NOTE
H&P Update    Patient seen and examined this morning. There are no changes to the documented H&P.    Patient has held home blood thinners for appropriate amount of time: N/A    Planned procedure: CHOLECYSTECTOMY, LAPAROSCOPIC    Positioning: Supine    Pre-operative Heparin Ordered: Yes    Pre-operative Antibiotics Ordered: Yes: rocephin & flagyl    Laterality Marked: N/A    Bebe Snider MD  6:32 AM  04/22/2024

## 2024-04-22 NOTE — OP NOTE
[facility]  General Surgery  Operative Note    SUMMARY     Date of Procedure: 2024     Procedure: Laparoscopic cholecystectomy    Surgeon(s) and Role:  Staff: Demetrice Gardner MD  Resident(s): Alexis Scheuermann, MD; Bridget Padgett MD    Pre-Operative Diagnosis: Cholelithiasis    Post-Operative Diagnosis:   Cholelithiasis  Chronic cholecystitis    Anesthesia: GETA    Operative Findings:   Gallbladder containing innumerable gallstones w/ multiple gallstones >1 cm  Chronic appearing inflammation of the gallbladder w/ loss of distinction between the gallbladder wall and liver w/ multiple gallstones embedded into the liver parenchyma      Description of Technical Procedures:   The patient was identified in the pre-op holding area by name, , and MRN. After verifying that written informed consent had been obtained, the patient was taken to the OR and placed on the table in the supine position. GETA was administered by anesthesia w/o complications. The abdomen was prepped and draped in the usual sterile fashion. A standard time-out was performed, again identifying the correct patient and procedure to be performed.     A Veress needle was inserted in the LUQ and used to insufflate the abdomen w/ CO2. Once adequate pneumoperitoneum was established, a 5 mm trochar was placed in the supraumbilical region. The abdominal cavity was inspected w/ the laparoscope, and no entry injury from the Veress was identified. Two additional 5 mm trocars were placed in the RUQ, and a 12 mm trocar was placed in the epigastrium, all under direct visualization.     Attention was turned to the gallbladder in the RUQ. Hook electrocautery was used to remove adherent omentum. Of note, the gallbladder was full of gallstones, and it was very difficult to grasp and retract the gallbladder. Hook electrocautery was then used to begin dissection of the peritoneum around the base of the gallbladder. A combination of  electrocautery and blunt dissection was used to clear the hepatocystic triangle of all adipose and fibrous tissue until the cystic duct and cystic artery were both clearly visualized entering the gallbladder. Hemostatic clips were placed on both the duct and artery, and scissors were used to transect each structure between the proximal and distal clips. The gallbladder was then dissected up away from the liver w/ hook electrocautery. Again, the inability to effectively grasp and retract the gallbladder, as well as chronic inflammation of the gallbladder and a loss of distinction between the gallbladder wall and liver parenchyma, made this difficult. Ultimately, the most superior portion of the back wall of the gallbladder was unable to be dissected off of the liver; there were multiple gallstones that appeared to be embedded within the liver parenchyma itself in this area. Due to the fact that the liver appeared cirrhotic, it was felt that dissecting the remaining gallbladder wall off of the liver would cause more bleeding than any benefit. Hook electrocautery was used to cauterize the remaining mucosal surface of the gallbladder wall.  The gallbladder and all spilled gallstones were placed in an Endobag and removed from the abdomen through the epigastric trocar site. All dissection areas were inspected and were found to be hemostatic.     The fascia of the epigastric incision was closed w/ 0 Vicryl, and all skin incisions were closed w/ 4-0 Monocryl. Sterile dressings were applied to all incisions.     All suture, needle, and instrument counts were correct x2 at the conclusion of the case.     Dr. Gardner was present for all critical portions of the case.     Estimated Blood Loss (EBL): Minimal           Implants: None    Drains: None    Specimens: Gallbladder    Complications: None            Condition: Stable    Disposition: PACU    Alexis Scheuermann, MD   [facility] General Surgery, PGY5  04/22/2024 8:17 AM

## 2024-04-23 VITALS
RESPIRATION RATE: 20 BRPM | HEART RATE: 92 BPM | SYSTOLIC BLOOD PRESSURE: 163 MMHG | BODY MASS INDEX: 18.75 KG/M2 | OXYGEN SATURATION: 96 % | TEMPERATURE: 98 F | WEIGHT: 127 LBS | DIASTOLIC BLOOD PRESSURE: 90 MMHG

## 2024-04-23 LAB
ESTROGEN SERPL-MCNC: NORMAL PG/ML
INSULIN SERPL-ACNC: NORMAL U[IU]/ML
LAB AP CLINICAL INFORMATION: NORMAL
LAB AP GROSS DESCRIPTION: NORMAL
LAB AP REPORT FOOTNOTES: NORMAL
T3RU NFR SERPL: NORMAL %

## 2024-04-23 NOTE — DISCHARGE SUMMARY
[facility]  Discharge Note  Short Stay    Procedure(s) (LRB):  CHOLECYSTECTOMY, LAPAROSCOPIC (N/A)      OUTCOME: Patient tolerated treatment/procedure well without complication and is now ready for discharge.    DISPOSITION: Home or Self Care    FINAL DIAGNOSIS:  <principal problem not specified>    FOLLOWUP: In clinic    DISCHARGE INSTRUCTIONS:    Discharge Procedure Orders   Diet Adult Regular     Lifting restrictions   Order Comments: No lifting more than 10-15lbs for 2-3 weeks     Notify your health care provider if you experience any of the following:  temperature >100.4     Notify your health care provider if you experience any of the following:  persistent nausea and vomiting or diarrhea     Notify your health care provider if you experience any of the following:  severe uncontrolled pain     Notify your health care provider if you experience any of the following:  redness, tenderness, or signs of infection (pain, swelling, redness, odor or green/yellow discharge around incision site)     No dressing needed     Leave dressing on - Keep it clean, dry, and intact until clinic visit     Activity as tolerated         Clinical Reference Documents Added to Patient Instructions         Document    CHOLECYSTECTOMY DISCHARGE INSTRUCTIONS (ENGLISH)    LACERATION REPAIR WITH GLUE DISCHARGE INSTRUCTIONS (ENGLISH)            TIME SPENT ON DISCHARGE: 15 minutes

## 2024-04-25 ENCOUNTER — HOSPITAL ENCOUNTER (OUTPATIENT)
Facility: HOSPITAL | Age: 69
Discharge: OTHER INSTITUTION | End: 2024-04-26
Attending: STUDENT IN AN ORGANIZED HEALTH CARE EDUCATION/TRAINING PROGRAM | Admitting: STUDENT IN AN ORGANIZED HEALTH CARE EDUCATION/TRAINING PROGRAM
Payer: MEDICARE

## 2024-04-25 DIAGNOSIS — K56.7 ILEUS: ICD-10-CM

## 2024-04-25 DIAGNOSIS — N17.9 AKI (ACUTE KIDNEY INJURY): Primary | ICD-10-CM

## 2024-04-25 DIAGNOSIS — E87.20 LACTIC ACIDOSIS: ICD-10-CM

## 2024-04-25 DIAGNOSIS — R53.83 FATIGUE: ICD-10-CM

## 2024-04-25 DIAGNOSIS — R07.9 CHEST PAIN: ICD-10-CM

## 2024-04-25 LAB
ALBUMIN SERPL-MCNC: 3.4 G/DL (ref 3.4–4.8)
ALBUMIN/GLOB SERPL: 0.8 RATIO (ref 1.1–2)
ALP SERPL-CCNC: 103 UNIT/L (ref 40–150)
ALT SERPL-CCNC: 90 UNIT/L (ref 0–55)
ANION GAP SERPL CALC-SCNC: 15 MEQ/L
AST SERPL-CCNC: 99 UNIT/L (ref 5–34)
BASOPHILS # BLD AUTO: 0.01 X10(3)/MCL
BASOPHILS NFR BLD AUTO: 0.1 %
BILIRUB SERPL-MCNC: 1.2 MG/DL
BUN SERPL-MCNC: 29.6 MG/DL (ref 8.4–25.7)
BUN SERPL-MCNC: 30.8 MG/DL (ref 8.4–25.7)
CALCIUM SERPL-MCNC: 10.4 MG/DL (ref 8.8–10)
CALCIUM SERPL-MCNC: 10.8 MG/DL (ref 8.8–10)
CHLORIDE SERPL-SCNC: 94 MMOL/L (ref 98–107)
CHLORIDE SERPL-SCNC: 94 MMOL/L (ref 98–107)
CO2 SERPL-SCNC: 28 MMOL/L (ref 23–31)
CO2 SERPL-SCNC: 30 MMOL/L (ref 23–31)
CREAT SERPL-MCNC: 1.76 MG/DL (ref 0.73–1.18)
CREAT SERPL-MCNC: 1.8 MG/DL (ref 0.73–1.18)
CREAT/UREA NIT SERPL: 17
EOSINOPHIL # BLD AUTO: 0 X10(3)/MCL (ref 0–0.9)
EOSINOPHIL NFR BLD AUTO: 0 %
ERYTHROCYTE [DISTWIDTH] IN BLOOD BY AUTOMATED COUNT: 11.5 % (ref 11.5–17)
GFR SERPLBLD CREATININE-BSD FMLA CKD-EPI: 41 MLS/MIN/1.73/M2
GFR SERPLBLD CREATININE-BSD FMLA CKD-EPI: 42 MLS/MIN/1.73/M2
GLOBULIN SER-MCNC: 4.5 GM/DL (ref 2.4–3.5)
GLUCOSE SERPL-MCNC: 202 MG/DL (ref 70–110)
GLUCOSE SERPL-MCNC: 212 MG/DL (ref 82–115)
GLUCOSE SERPL-MCNC: 244 MG/DL (ref 82–115)
HCT VFR BLD AUTO: 58.3 % (ref 42–52)
HGB BLD-MCNC: 19.1 G/DL (ref 14–18)
IMM GRANULOCYTES # BLD AUTO: 0.01 X10(3)/MCL (ref 0–0.04)
IMM GRANULOCYTES NFR BLD AUTO: 0.1 %
LACTATE SERPL-SCNC: 2.1 MMOL/L (ref 0.5–2.2)
LACTATE SERPL-SCNC: 2.7 MMOL/L (ref 0.5–2.2)
LACTATE SERPL-SCNC: 3.2 MMOL/L (ref 0.5–2.2)
LYMPHOCYTES # BLD AUTO: 1.07 X10(3)/MCL (ref 0.6–4.6)
LYMPHOCYTES NFR BLD AUTO: 13.7 %
MCH RBC QN AUTO: 29.6 PG (ref 27–31)
MCHC RBC AUTO-ENTMCNC: 32.8 G/DL (ref 33–36)
MCV RBC AUTO: 90.4 FL (ref 80–94)
MONOCYTES # BLD AUTO: 0.73 X10(3)/MCL (ref 0.1–1.3)
MONOCYTES NFR BLD AUTO: 9.4 %
NEUTROPHILS # BLD AUTO: 5.98 X10(3)/MCL (ref 2.1–9.2)
NEUTROPHILS NFR BLD AUTO: 76.7 %
PLATELET # BLD AUTO: 247 X10(3)/MCL (ref 130–400)
PMV BLD AUTO: 10.4 FL (ref 7.4–10.4)
POC CARDIAC TROPONIN I: 0 NG/ML (ref 0–0.08)
POTASSIUM SERPL-SCNC: 4.1 MMOL/L (ref 3.5–5.1)
POTASSIUM SERPL-SCNC: 4.6 MMOL/L (ref 3.5–5.1)
PROT SERPL-MCNC: 7.9 GM/DL (ref 5.8–7.6)
RBC # BLD AUTO: 6.45 X10(6)/MCL (ref 4.7–6.1)
SAMPLE: NORMAL
SODIUM SERPL-SCNC: 137 MMOL/L (ref 136–145)
SODIUM SERPL-SCNC: 138 MMOL/L (ref 136–145)
WBC # SPEC AUTO: 7.8 X10(3)/MCL (ref 4.5–11.5)

## 2024-04-25 PROCEDURE — 93010 ELECTROCARDIOGRAM REPORT: CPT | Mod: ,,, | Performed by: INTERNAL MEDICINE

## 2024-04-25 PROCEDURE — G0378 HOSPITAL OBSERVATION PER HR: HCPCS

## 2024-04-25 PROCEDURE — 96361 HYDRATE IV INFUSION ADD-ON: CPT

## 2024-04-25 PROCEDURE — 99285 EMERGENCY DEPT VISIT HI MDM: CPT | Mod: 25

## 2024-04-25 PROCEDURE — 93005 ELECTROCARDIOGRAM TRACING: CPT

## 2024-04-25 PROCEDURE — 63600175 PHARM REV CODE 636 W HCPCS: Performed by: STUDENT IN AN ORGANIZED HEALTH CARE EDUCATION/TRAINING PROGRAM

## 2024-04-25 PROCEDURE — 80053 COMPREHEN METABOLIC PANEL: CPT | Performed by: STUDENT IN AN ORGANIZED HEALTH CARE EDUCATION/TRAINING PROGRAM

## 2024-04-25 PROCEDURE — 83605 ASSAY OF LACTIC ACID: CPT | Performed by: STUDENT IN AN ORGANIZED HEALTH CARE EDUCATION/TRAINING PROGRAM

## 2024-04-25 PROCEDURE — 36415 COLL VENOUS BLD VENIPUNCTURE: CPT | Performed by: STUDENT IN AN ORGANIZED HEALTH CARE EDUCATION/TRAINING PROGRAM

## 2024-04-25 PROCEDURE — 85025 COMPLETE CBC W/AUTO DIFF WBC: CPT | Performed by: STUDENT IN AN ORGANIZED HEALTH CARE EDUCATION/TRAINING PROGRAM

## 2024-04-25 RX ORDER — TALC
9 POWDER (GRAM) TOPICAL NIGHTLY PRN
Status: DISCONTINUED | OUTPATIENT
Start: 2024-04-25 | End: 2024-04-26 | Stop reason: HOSPADM

## 2024-04-25 RX ORDER — GLUCAGON 1 MG
1 KIT INJECTION
Status: DISCONTINUED | OUTPATIENT
Start: 2024-04-25 | End: 2024-04-26 | Stop reason: HOSPADM

## 2024-04-25 RX ORDER — SODIUM CHLORIDE, SODIUM LACTATE, POTASSIUM CHLORIDE, CALCIUM CHLORIDE 600; 310; 30; 20 MG/100ML; MG/100ML; MG/100ML; MG/100ML
INJECTION, SOLUTION INTRAVENOUS CONTINUOUS
Status: DISCONTINUED | OUTPATIENT
Start: 2024-04-25 | End: 2024-04-26

## 2024-04-25 RX ORDER — NALOXONE HCL 0.4 MG/ML
0.02 VIAL (ML) INJECTION
Status: DISCONTINUED | OUTPATIENT
Start: 2024-04-25 | End: 2024-04-26 | Stop reason: HOSPADM

## 2024-04-25 RX ORDER — ONDANSETRON 4 MG/1
8 TABLET, ORALLY DISINTEGRATING ORAL EVERY 8 HOURS PRN
Status: DISCONTINUED | OUTPATIENT
Start: 2024-04-25 | End: 2024-04-26 | Stop reason: HOSPADM

## 2024-04-25 RX ORDER — SODIUM CHLORIDE 0.9 % (FLUSH) 0.9 %
10 SYRINGE (ML) INJECTION
Status: DISCONTINUED | OUTPATIENT
Start: 2024-04-25 | End: 2024-04-26 | Stop reason: HOSPADM

## 2024-04-25 RX ORDER — INSULIN ASPART 100 [IU]/ML
0-5 INJECTION, SOLUTION INTRAVENOUS; SUBCUTANEOUS EVERY 6 HOURS PRN
Status: DISCONTINUED | OUTPATIENT
Start: 2024-04-25 | End: 2024-04-26

## 2024-04-25 RX ORDER — ENOXAPARIN SODIUM 100 MG/ML
30 INJECTION SUBCUTANEOUS EVERY 24 HOURS
Status: DISCONTINUED | OUTPATIENT
Start: 2024-04-26 | End: 2024-04-26 | Stop reason: HOSPADM

## 2024-04-25 RX ORDER — POLYETHYLENE GLYCOL 3350 17 G/17G
17 POWDER, FOR SOLUTION ORAL 3 TIMES DAILY PRN
Status: DISCONTINUED | OUTPATIENT
Start: 2024-04-25 | End: 2024-04-26 | Stop reason: HOSPADM

## 2024-04-25 RX ORDER — HYDROCODONE BITARTRATE AND ACETAMINOPHEN 5; 325 MG/1; MG/1
1 TABLET ORAL EVERY 6 HOURS PRN
Status: DISCONTINUED | OUTPATIENT
Start: 2024-04-25 | End: 2024-04-26

## 2024-04-25 RX ORDER — IPRATROPIUM BROMIDE AND ALBUTEROL SULFATE 2.5; .5 MG/3ML; MG/3ML
3 SOLUTION RESPIRATORY (INHALATION) EVERY 6 HOURS PRN
Status: DISCONTINUED | OUTPATIENT
Start: 2024-04-25 | End: 2024-04-26 | Stop reason: HOSPADM

## 2024-04-25 RX ORDER — GLUCAGON 1 MG
1 KIT INJECTION
Status: DISCONTINUED | OUTPATIENT
Start: 2024-04-25 | End: 2024-04-26

## 2024-04-25 RX ORDER — BISACODYL 10 MG/1
10 SUPPOSITORY RECTAL DAILY PRN
Status: DISCONTINUED | OUTPATIENT
Start: 2024-04-25 | End: 2024-04-26 | Stop reason: HOSPADM

## 2024-04-25 RX ORDER — ALUMINUM HYDROXIDE, MAGNESIUM HYDROXIDE, AND SIMETHICONE 1200; 120; 1200 MG/30ML; MG/30ML; MG/30ML
30 SUSPENSION ORAL 4 TIMES DAILY PRN
Status: DISCONTINUED | OUTPATIENT
Start: 2024-04-25 | End: 2024-04-26 | Stop reason: HOSPADM

## 2024-04-25 RX ORDER — SIMETHICONE 80 MG
1 TABLET,CHEWABLE ORAL 4 TIMES DAILY PRN
Status: DISCONTINUED | OUTPATIENT
Start: 2024-04-25 | End: 2024-04-26 | Stop reason: HOSPADM

## 2024-04-25 RX ORDER — ACETAMINOPHEN 325 MG/1
650 TABLET ORAL EVERY 4 HOURS PRN
Status: DISCONTINUED | OUTPATIENT
Start: 2024-04-25 | End: 2024-04-26 | Stop reason: HOSPADM

## 2024-04-25 RX ORDER — HYDROCODONE BITARTRATE AND ACETAMINOPHEN 5; 325 MG/1; MG/1
1 TABLET ORAL EVERY 6 HOURS PRN
Status: DISCONTINUED | OUTPATIENT
Start: 2024-04-25 | End: 2024-04-26 | Stop reason: HOSPADM

## 2024-04-25 RX ORDER — MORPHINE SULFATE 4 MG/ML
2 INJECTION, SOLUTION INTRAMUSCULAR; INTRAVENOUS EVERY 6 HOURS PRN
Status: DISCONTINUED | OUTPATIENT
Start: 2024-04-25 | End: 2024-04-26 | Stop reason: HOSPADM

## 2024-04-25 RX ORDER — SODIUM CHLORIDE 0.9 % (FLUSH) 0.9 %
10 SYRINGE (ML) INJECTION
Status: DISCONTINUED | OUTPATIENT
Start: 2024-04-25 | End: 2024-04-26

## 2024-04-25 RX ORDER — IBUPROFEN 200 MG
16 TABLET ORAL
Status: DISCONTINUED | OUTPATIENT
Start: 2024-04-25 | End: 2024-04-26 | Stop reason: HOSPADM

## 2024-04-25 RX ORDER — ONDANSETRON HYDROCHLORIDE 2 MG/ML
4 INJECTION, SOLUTION INTRAVENOUS EVERY 8 HOURS PRN
Status: DISCONTINUED | OUTPATIENT
Start: 2024-04-25 | End: 2024-04-26 | Stop reason: HOSPADM

## 2024-04-25 RX ORDER — IBUPROFEN 200 MG
24 TABLET ORAL
Status: DISCONTINUED | OUTPATIENT
Start: 2024-04-25 | End: 2024-04-26 | Stop reason: HOSPADM

## 2024-04-25 RX ADMIN — SODIUM CHLORIDE, POTASSIUM CHLORIDE, SODIUM LACTATE AND CALCIUM CHLORIDE 1000 ML: 600; 310; 30; 20 INJECTION, SOLUTION INTRAVENOUS at 07:04

## 2024-04-25 RX ADMIN — SODIUM CHLORIDE, POTASSIUM CHLORIDE, SODIUM LACTATE AND CALCIUM CHLORIDE 1000 ML: 600; 310; 30; 20 INJECTION, SOLUTION INTRAVENOUS at 09:04

## 2024-04-25 RX ADMIN — SODIUM CHLORIDE, POTASSIUM CHLORIDE, SODIUM LACTATE AND CALCIUM CHLORIDE 1000 ML: 600; 310; 30; 20 INJECTION, SOLUTION INTRAVENOUS at 06:04

## 2024-04-25 RX ADMIN — SODIUM CHLORIDE, POTASSIUM CHLORIDE, SODIUM LACTATE AND CALCIUM CHLORIDE: 600; 310; 30; 20 INJECTION, SOLUTION INTRAVENOUS at 11:04

## 2024-04-25 NOTE — ED PROVIDER NOTES
"Encounter Date: 4/25/2024       History     Chief Complaint   Patient presents with    Fatigue     Pt presents after near syncopal episode this afternoon; pt had gallbladder removed on Monday; went home with Norco for pain, states took a pain pill this AM, laid back in bed; around 3pm he was getting out of bed, became very weak, diaphoretic, vomited x1, and near syncopal; initial bp 70's; received NS 500mL, Zofran 4mg      Patient is a 68-year-old  gentleman with a history of diabetes and hypertension who presented to the ER via EMS due to a presyncopal episode.  Patient states it occurred after he josep from a seated position.  He states he did not have full syncope and did not fall.  He states that he felt diaphoretic at that time in lightheaded but symptoms resolved after he sat down.  He denied any chest pain, shortness of breath or palpitations during that time.  He states he did have 1 episode of nonbloody nonbilious emesis.  Important to note he also underwent a elective cholecystectomy for cholelithiasis 3 days prior to arrival.  He states his p.o. intake has significantly diminished as he was "worried about my surgery. "He denies any dysuria or hematuria.  He has been taking opioids post operatively which is not common for this patient.      Review of patient's allergies indicates:  No Known Allergies  Past Medical History:   Diagnosis Date    Chronic low back pain with left-sided sciatica     Diabetes mellitus     HTN (hypertension)      Past Surgical History:   Procedure Laterality Date    COLONOSCOPY N/A 4/10/2024    Procedure: COLON;  Surgeon: Ravi Petit MD;  Location: [facility] ENDOSCOPY;  Service: Gastroenterology;  Laterality: N/A;    EGD, WITH CLOSED BIOPSY  4/9/2024    Procedure: EGD, WITH CLOSED BIOPSY;  Surgeon: Ravi Petit MD;  Location: [facility] ENDOSCOPY;  Service: Gastroenterology;;    ESOPHAGOGASTRODUODENOSCOPY N/A 4/9/2024    Procedure: EGD;  Surgeon: Marisabel, " Ravi SCOTT MD;  Location: [facility] ENDOSCOPY;  Service: Gastroenterology;  Laterality: N/A;    HIP SURGERY Left     LAPAROSCOPIC CHOLECYSTECTOMY N/A 4/22/2024    Procedure: CHOLECYSTECTOMY, LAPAROSCOPIC;  Surgeon: Demetrice Gardner MD;  Location: [facility];  Service: General;  Laterality: N/A;    ORIF HIP FRACTURE Right     >10 years     Family History   Problem Relation Name Age of Onset    No Known Problems Mother      Diabetes Father       Social History     Tobacco Use    Smoking status: Every Day     Current packs/day: 0.50     Average packs/day: 0.5 packs/day for 52.3 years (26.2 ttl pk-yrs)     Types: Cigarettes     Start date: 1/1/1972    Smokeless tobacco: Never   Substance Use Topics    Alcohol use: Not Currently     Comment: occ    Drug use: Never     Review of Systems   Constitutional:  Positive for diaphoresis. Negative for chills, fatigue and fever.   HENT:  Negative for congestion, sore throat and trouble swallowing.    Eyes:  Negative for pain and visual disturbance.   Respiratory:  Negative for cough, shortness of breath and wheezing.    Cardiovascular:  Negative for chest pain and palpitations.   Gastrointestinal:  Positive for nausea and vomiting. Negative for abdominal pain, blood in stool, constipation and diarrhea.   Genitourinary:  Negative for dysuria and hematuria.   Musculoskeletal:  Negative for back pain and myalgias.   Skin:  Negative for rash and wound.   Neurological:  Positive for light-headedness. Negative for dizziness, tremors, seizures, syncope, facial asymmetry, speech difficulty, weakness, numbness and headaches.   Psychiatric/Behavioral:  Negative for confusion. The patient is not nervous/anxious.        Physical Exam     Initial Vitals [04/25/24 1744]   BP Pulse Resp Temp SpO2   119/78 100 16 98.2 °F (36.8 °C) 100 %      MAP       --         Physical Exam    Nursing note and vitals reviewed.  Constitutional: He appears well-developed and well-nourished. No distress.   HENT:    Head: Normocephalic and atraumatic.   Eyes: Conjunctivae and EOM are normal. Right eye exhibits no discharge. Left eye exhibits no discharge. No scleral icterus.   Neck: No tracheal deviation present.   Normal range of motion.  Cardiovascular:  Normal rate, regular rhythm and normal heart sounds.     Exam reveals no gallop and no friction rub.       No murmur heard.  Pulmonary/Chest: Breath sounds normal. No respiratory distress. He has no wheezes. He has no rhonchi. He has no rales.   Abdominal: Abdomen is soft. He exhibits no distension. There is no abdominal tenderness.   Laparoscopic incision marks noted on the abdomen with Dermabond overlying with no surrounding cellulitic change.  Abdomen is mildly tender diffusely but no signs of acute peritonitis or acute surgical abdomen.  Findings most consistent with a expected tenderness after surgical intervention. There is no rebound and no guarding.   Musculoskeletal:         General: No edema. Normal range of motion.      Cervical back: Normal range of motion.     Neurological: He is alert.   Skin: Skin is warm and dry. No rash and no abscess noted. No erythema. No pallor.   Psychiatric: His behavior is normal. Judgment normal.         ED Course   Critical Care    Date/Time: 4/25/2024 10:46 PM    Performed by: Ravi Alarcon MD  Authorized by: Ravi Alarcon MD  Direct patient critical care time: 10 minutes  Additional history critical care time: 10 minutes  Ordering / reviewing critical care time: 10 minutes  Documentation critical care time: 10 minutes  Consulting other physicians critical care time: 10 minutes  Total critical care time (exclusive of procedural time) : 50 minutes  Critical care was necessary to treat or prevent imminent or life-threatening deterioration of the following conditions: dehydration and renal failure.  Critical care was time spent personally by me on the following activities: blood draw for specimens, development of treatment plan with  patient or surrogate, discussions with consultants, evaluation of patient's response to treatment, examination of patient, obtaining history from patient or surrogate, ordering and performing treatments and interventions, ordering and review of laboratory studies, ordering and review of radiographic studies, re-evaluation of patient's condition, pulse oximetry and review of old charts.        Labs Reviewed   COMPREHENSIVE METABOLIC PANEL - Abnormal; Notable for the following components:       Result Value    Chloride 94 (*)     Glucose Level 244 (*)     Blood Urea Nitrogen 30.8 (*)     Creatinine 1.80 (*)     Calcium Level Total 10.8 (*)     Protein Total 7.9 (*)     Globulin 4.5 (*)     Albumin/Globulin Ratio 0.8 (*)     Alanine Aminotransferase 90 (*)     Aspartate Aminotransferase 99 (*)     All other components within normal limits   LACTIC ACID, PLASMA - Abnormal; Notable for the following components:    Lactic Acid Level 2.7 (*)     All other components within normal limits   CBC WITH DIFFERENTIAL - Abnormal; Notable for the following components:    RBC 6.45 (*)     Hgb 19.1 (*)     Hct 58.3 (*)     MCHC 32.8 (*)     All other components within normal limits   LACTIC ACID, PLASMA - Abnormal; Notable for the following components:    Lactic Acid Level 3.2 (*)     All other components within normal limits   BASIC METABOLIC PANEL - Abnormal; Notable for the following components:    Chloride 94 (*)     Glucose Level 212 (*)     Blood Urea Nitrogen 29.6 (*)     Creatinine 1.76 (*)     Calcium Level Total 10.4 (*)     All other components within normal limits   LACTIC ACID, PLASMA - Normal   CBC W/ AUTO DIFFERENTIAL    Narrative:     The following orders were created for panel order CBC Auto Differential.  Procedure                               Abnormality         Status                     ---------                               -----------         ------                     CBC with Differential[[id number]]        Abnormal            Final result                 Please view results for these tests on the individual orders.   TROPONIN ISTAT   URINALYSIS, REFLEX TO URINE CULTURE   DRUG SCREEN, URINE (BEAKER)   POCT TROPONIN     EKG Readings: (Independently Interpreted)   Initial Reading: No STEMI. Rhythm: Normal Sinus Rhythm. Heart Rate: 94. Ectopy: No Ectopy. Conduction: Normal. ST Segments: Normal ST Segments. T Waves: Normal. Axis: Normal.     ECG Results              EKG 12-lead (In process)        Collection Time Result Time QRS Duration [facility] QTC Calculation    04/25/24 18:32:12 04/25/24 20:00:48 98 445                     In process by Interface, Lab In [facility] (04/25/24 20:00:57)                   Narrative:    Test Reason : R53.83,    Vent. Rate : 094 BPM     Atrial Rate : 094 BPM     P-R Int : 116 ms          QRS Dur : 098 ms      QT Int : 356 ms       P-R-T Axes : 083 076 083 degrees     QTc Int : 445 ms    Normal sinus rhythm  Right atrial enlargement  Moderate voltage criteria for LVH, may be normal variant  Borderline Abnormal ECG  When compared with ECG of 06-OCT-2022 10:53,  Vent. rate has increased BY  35 BPM  QT has lengthened    Referred By: [email or web address]   SELF           Confirmed By:                                   Imaging Results              CT Abdomen Pelvis  Without Contrast (Final result)  Result time 04/25/24 20:15:49      Final result by Khanh James MD (04/25/24 20:15:49)                   Impression:      1. Postsurgical changes of prior cholecystectomy with low-density fluid as well as locules of air in the gallbladder fossa.  This focal in etiology, however, developing abscess is not entirely excluded.  2. Diffuse pneumoperitoneum likely secondary to recent surgical intervention.  3. Findings suggestive of ileus.      Electronically signed by: Khanh James MD  Date:    04/25/2024  Time:    20:15               Narrative:    EXAMINATION:  CT ABDOMEN PELVIS WITHOUT CONTRAST    CLINICAL  HISTORY:  hypotension after cholecystectomy;    TECHNIQUE:  Multiple cross-sectional images were obtained from the liver symphysis without the use of contrast.  Coronal and sagittal reformatted images were obtained.  An automated dose exposure technique was utilized.  This limits radiation does the patient    COMPARISON:  04/08/2024    FINDINGS:  Dependent atelectatic changes lungs with concern for developing consolidation within lung base.  This is new when compared to the prior examination.  Heart size is borderline enlarged with coronary calcifications.  Ectasia of the ascending thoracic aorta.    Evaluation of hollow and solid viscera is limited secondary to technique.    The liver appears dense.  The spleen, adrenals, kidneys, and pancreas are normal.  Postsurgical changes are from cholecystectomy with surgical clips in the fossa.  Low-density fluid with multiple locules of air is identified within the gallbladder fossa.    The gastric body is distended as is the small bowel.  Moderate to large stool burden is identified throughout colon which is also gaseous filled.  No evidence for perforation or pneumatosis.  Questionable normal appendix.    Bladder and prostate are normal.  The course and caliber of the abdominal is normal with diffuse calcified atheromatous disease.  No free fluid in the abdomen or pelvis.  Reactive lymph nodes.  Diffuse pneumoperitoneum is noted likely from recent surgical intervention.    No suspicious osseous lesions.  Postsurgical changes are identified of the left hip.  Degenerative changes are noted.  Partial fusion of bilateral SI joints.  Postsurgical changes the soft tissues are noted.                                       X-Ray Chest 1 View (Final result)  Result time 04/25/24 18:46:11      Final result by Yovana Landry MD (04/25/24 18:46:11)                   Impression:      No acute cardiopulmonary abnormality.      Electronically signed by: Yovana  Frantz  Date:    04/25/2024  Time:    18:46               Narrative:    EXAMINATION:  XR CHEST 1 VIEW    CLINICAL HISTORY:  weakness;    TECHNIQUE:  Single frontal view of the chest was performed.    COMPARISON:  07/27/2016    FINDINGS:  LINES AND TUBES: None    MEDIASTINUM AND JANEL: The cardiac silhouette is normal.    LUNGS: No lobar consolidation. No edema.    PLEURA:No pleural effusion. No pneumothorax.    OTHER: There are gas distended bowel loops under the left hemidiaphragm.                                       Medications   sodium chloride 0.9% flush 10 mL (has no administration in time range)   albuterol-ipratropium 2.5 mg-0.5 mg/3 mL nebulizer solution 3 mL (has no administration in time range)   melatonin tablet 9 mg (has no administration in time range)   ondansetron disintegrating tablet 8 mg (has no administration in time range)   ondansetron injection 4 mg (has no administration in time range)   polyethylene glycol packet 17 g (has no administration in time range)   bisacodyL suppository 10 mg (has no administration in time range)   simethicone chewable tablet 80 mg (has no administration in time range)   aluminum-magnesium hydroxide-simethicone 200-200-20 mg/5 mL suspension 30 mL (has no administration in time range)   acetaminophen tablet 650 mg (has no administration in time range)   HYDROcodone-acetaminophen 5-325 mg per tablet 1 tablet (has no administration in time range)   morphine injection 2 mg (has no administration in time range)   naloxone 0.4 mg/mL injection 0.02 mg (has no administration in time range)   glucose chewable tablet 16 g (has no administration in time range)   glucose chewable tablet 24 g (has no administration in time range)   glucagon (human recombinant) injection 1 mg (has no administration in time range)   enoxaparin injection 30 mg (has no administration in time range)   lactated ringers bolus 1,000 mL (0 mLs Intravenous Stopped 4/25/24 1945)   lactated ringers bolus  1,000 mL (0 mLs Intravenous Stopped 4/25/24 2044)   lactated ringers bolus 1,000 mL (0 mLs Intravenous Stopped 4/25/24 2200)     Medical Decision Making  Differentials:  Dehydration, intra-abdominal infection, orthostatic hypotension  Historian is the patient and EMS  68-year-old cachectic male presents 3 days postop for decreased p.o. intake and presyncope.  EKG shows no ischemic changes.  Basic labs notable for hemoconcentration.  GILBERTO noted as well as lactic acidosis.  Fluids started.  Patient was noted to be orthostatic positive.  CT abdomen pelvis without contrast due to GILBERTO was done which showed fluid accumulation and could not rule out abscess formation.  Given that patient has been afebrile and only minimally tender to palpation in the absence of leukocytosis have a low suspicion clinically.  He does however have an ileus in his not had a bowel movement since surgery.  NPO order placed.  Fluids started.  Creatinine improved and so did the lactic acid.  I discussed the case with Dr. Reyes who accepted for further management.    Amount and/or Complexity of Data Reviewed  Labs: ordered. Decision-making details documented in ED Course.  Radiology: ordered. Decision-making details documented in ED Course.  ECG/medicine tests: ordered and independent interpretation performed. Decision-making details documented in ED Course.    Risk  OTC drugs.  Prescription drug management.  Decision regarding hospitalization.                                      Clinical Impression:  Final diagnoses:  [R53.83] Fatigue  [N17.9] GILBERTO (acute kidney injury) (Primary)  [K56.7] Ileus  [E87.20] Lactic acidosis  [R07.9] Chest pain          ED Disposition Condition    Observation Stable                Ravi Alarcon MD  04/25/24 0733

## 2024-04-26 ENCOUNTER — HOSPITAL ENCOUNTER (INPATIENT)
Facility: HOSPITAL | Age: 69
LOS: 4 days | Discharge: HOME | DRG: 393 | End: 2024-04-30
Attending: COLON & RECTAL SURGERY | Admitting: COLON & RECTAL SURGERY
Payer: MEDICARE

## 2024-04-26 VITALS
HEIGHT: 67 IN | DIASTOLIC BLOOD PRESSURE: 89 MMHG | SYSTOLIC BLOOD PRESSURE: 178 MMHG | HEART RATE: 75 BPM | BODY MASS INDEX: 20.52 KG/M2 | OXYGEN SATURATION: 98 % | WEIGHT: 130.75 LBS | TEMPERATURE: 99 F | RESPIRATION RATE: 18 BRPM

## 2024-04-26 DIAGNOSIS — K56.7 ILEUS: Primary | ICD-10-CM

## 2024-04-26 DIAGNOSIS — R07.9 CHEST PAIN: ICD-10-CM

## 2024-04-26 DIAGNOSIS — K65.1 INTRA-ABDOMINAL ABSCESS: ICD-10-CM

## 2024-04-26 LAB
ALBUMIN SERPL-MCNC: 3 G/DL (ref 3.4–4.8)
ALP SERPL-CCNC: 81 UNIT/L (ref 40–150)
ALT SERPL-CCNC: 63 UNIT/L (ref 0–55)
AMPHET UR QL SCN: NEGATIVE
ANION GAP SERPL CALC-SCNC: 13 MEQ/L
APPEARANCE UR: CLEAR
AST SERPL-CCNC: 42 UNIT/L (ref 5–34)
BACTERIA #/AREA URNS AUTO: ABNORMAL /HPF
BARBITURATE SCN PRESENT UR: NEGATIVE
BASOPHILS # BLD AUTO: 0.01 X10(3)/MCL
BASOPHILS NFR BLD AUTO: 0.2 %
BENZODIAZ UR QL SCN: NEGATIVE
BILIRUB SERPL-MCNC: 0.8 MG/DL
BILIRUB UR QL STRIP.AUTO: ABNORMAL
BILIRUBIN DIRECT+TOT PNL SERPL-MCNC: 0.4 MG/DL (ref 0–0.8)
BILIRUBIN DIRECT+TOT PNL SERPL-MCNC: 0.4 MG/DL (ref 0–?)
BUN SERPL-MCNC: 33.8 MG/DL (ref 8.4–25.7)
CALCIUM SERPL-MCNC: 9.3 MG/DL (ref 8.8–10)
CANNABINOIDS UR QL SCN: NEGATIVE
CHLORIDE SERPL-SCNC: 99 MMOL/L (ref 98–107)
CO2 SERPL-SCNC: 32 MMOL/L (ref 23–31)
COCAINE UR QL SCN: NEGATIVE
COLOR UR AUTO: ABNORMAL
CREAT SERPL-MCNC: 0.96 MG/DL (ref 0.73–1.18)
CREAT/UREA NIT SERPL: 35
EOSINOPHIL # BLD AUTO: 0 X10(3)/MCL (ref 0–0.9)
EOSINOPHIL NFR BLD AUTO: 0 %
ERYTHROCYTE [DISTWIDTH] IN BLOOD BY AUTOMATED COUNT: 11.4 % (ref 11.5–17)
GFR SERPLBLD CREATININE-BSD FMLA CKD-EPI: >60 MLS/MIN/1.73/M2
GLUCOSE SERPL-MCNC: 209 MG/DL (ref 82–115)
GLUCOSE UR QL STRIP.AUTO: 250
GRAN CASTS URNS QL MICRO: ABNORMAL /LPF
HCT VFR BLD AUTO: 52.8 % (ref 42–52)
HGB BLD-MCNC: 17.8 G/DL (ref 14–18)
HYALINE CASTS URNS QL MICRO: ABNORMAL /LPF
IMM GRANULOCYTES # BLD AUTO: 0.01 X10(3)/MCL (ref 0–0.04)
IMM GRANULOCYTES NFR BLD AUTO: 0.2 %
KETONES UR QL STRIP.AUTO: ABNORMAL
LEUKOCYTE ESTERASE UR QL STRIP.AUTO: NEGATIVE
LYMPHOCYTES # BLD AUTO: 0.81 X10(3)/MCL (ref 0.6–4.6)
LYMPHOCYTES NFR BLD AUTO: 14.1 %
MCH RBC QN AUTO: 29.8 PG (ref 27–31)
MCHC RBC AUTO-ENTMCNC: 33.7 G/DL (ref 33–36)
MCV RBC AUTO: 88.3 FL (ref 80–94)
MONOCYTES # BLD AUTO: 0.53 X10(3)/MCL (ref 0.1–1.3)
MONOCYTES NFR BLD AUTO: 9.2 %
NEUTROPHILS # BLD AUTO: 4.4 X10(3)/MCL (ref 2.1–9.2)
NEUTROPHILS NFR BLD AUTO: 76.3 %
NITRITE UR QL STRIP.AUTO: NEGATIVE
OPIATES UR QL SCN: POSITIVE
PCP UR QL: NEGATIVE
PH UR STRIP.AUTO: 6 [PH]
PH UR: 6 [PH] (ref 3–11)
PLATELET # BLD AUTO: 239 X10(3)/MCL (ref 130–400)
PMV BLD AUTO: 10.9 FL (ref 7.4–10.4)
POCT GLUCOSE: 150 MG/DL (ref 70–110)
POCT GLUCOSE: 156 MG/DL (ref 70–110)
POCT GLUCOSE: 202 MG/DL (ref 70–110)
POTASSIUM SERPL-SCNC: 3.7 MMOL/L (ref 3.5–5.1)
PROT SERPL-MCNC: 6.1 GM/DL (ref 5.8–7.6)
PROT UR QL STRIP.AUTO: 100
RBC # BLD AUTO: 5.98 X10(6)/MCL (ref 4.7–6.1)
RBC #/AREA URNS AUTO: ABNORMAL /HPF
RBC UR QL AUTO: NEGATIVE
SODIUM SERPL-SCNC: 144 MMOL/L (ref 136–145)
SP GR UR STRIP.AUTO: >=1.03 (ref 1–1.03)
SPECIFIC GRAVITY, URINE AUTO (.000) (OHS): >1.03 (ref 1–1.03)
SQUAMOUS #/AREA URNS AUTO: ABNORMAL /HPF
UROBILINOGEN UR STRIP-ACNC: 1
WBC # SPEC AUTO: 5.76 X10(3)/MCL (ref 4.5–11.5)
WBC #/AREA URNS AUTO: ABNORMAL /HPF
WBC CASTS URNS QL MICRO: ABNORMAL /LPF

## 2024-04-26 PROCEDURE — 80048 BASIC METABOLIC PNL TOTAL CA: CPT | Performed by: STUDENT IN AN ORGANIZED HEALTH CARE EDUCATION/TRAINING PROGRAM

## 2024-04-26 PROCEDURE — 97530 THERAPEUTIC ACTIVITIES: CPT | Mod: CQ

## 2024-04-26 PROCEDURE — 63600175 PHARM REV CODE 636 W HCPCS: Performed by: PHYSICIAN ASSISTANT

## 2024-04-26 PROCEDURE — 80307 DRUG TEST PRSMV CHEM ANLYZR: CPT | Performed by: STUDENT IN AN ORGANIZED HEALTH CARE EDUCATION/TRAINING PROGRAM

## 2024-04-26 PROCEDURE — 80076 HEPATIC FUNCTION PANEL: CPT | Performed by: PHYSICIAN ASSISTANT

## 2024-04-26 PROCEDURE — 25000003 PHARM REV CODE 250: Performed by: STUDENT IN AN ORGANIZED HEALTH CARE EDUCATION/TRAINING PROGRAM

## 2024-04-26 PROCEDURE — 25000003 PHARM REV CODE 250: Performed by: PHYSICIAN ASSISTANT

## 2024-04-26 PROCEDURE — 97165 OT EVAL LOW COMPLEX 30 MIN: CPT

## 2024-04-26 PROCEDURE — 96361 HYDRATE IV INFUSION ADD-ON: CPT

## 2024-04-26 PROCEDURE — 25000003 PHARM REV CODE 250: Performed by: COLON & RECTAL SURGERY

## 2024-04-26 PROCEDURE — 36415 COLL VENOUS BLD VENIPUNCTURE: CPT | Performed by: STUDENT IN AN ORGANIZED HEALTH CARE EDUCATION/TRAINING PROGRAM

## 2024-04-26 PROCEDURE — 85025 COMPLETE CBC W/AUTO DIFF WBC: CPT | Performed by: STUDENT IN AN ORGANIZED HEALTH CARE EDUCATION/TRAINING PROGRAM

## 2024-04-26 PROCEDURE — G0378 HOSPITAL OBSERVATION PER HR: HCPCS

## 2024-04-26 PROCEDURE — 96374 THER/PROPH/DIAG INJ IV PUSH: CPT

## 2024-04-26 PROCEDURE — 81003 URINALYSIS AUTO W/O SCOPE: CPT | Performed by: STUDENT IN AN ORGANIZED HEALTH CARE EDUCATION/TRAINING PROGRAM

## 2024-04-26 PROCEDURE — 11000001 HC ACUTE MED/SURG PRIVATE ROOM

## 2024-04-26 PROCEDURE — 97161 PT EVAL LOW COMPLEX 20 MIN: CPT

## 2024-04-26 PROCEDURE — 63600175 PHARM REV CODE 636 W HCPCS: Performed by: STUDENT IN AN ORGANIZED HEALTH CARE EDUCATION/TRAINING PROGRAM

## 2024-04-26 PROCEDURE — 96375 TX/PRO/DX INJ NEW DRUG ADDON: CPT

## 2024-04-26 RX ORDER — TALC
9 POWDER (GRAM) TOPICAL NIGHTLY PRN
Status: CANCELLED | OUTPATIENT
Start: 2024-04-26

## 2024-04-26 RX ORDER — MORPHINE SULFATE 2 MG/ML
2 INJECTION, SOLUTION INTRAMUSCULAR; INTRAVENOUS EVERY 6 HOURS PRN
Status: DISCONTINUED | OUTPATIENT
Start: 2024-04-26 | End: 2024-04-30 | Stop reason: HOSPADM

## 2024-04-26 RX ORDER — IBUPROFEN 200 MG
16 TABLET ORAL
Status: CANCELLED | OUTPATIENT
Start: 2024-04-26

## 2024-04-26 RX ORDER — IBUPROFEN 200 MG
16 TABLET ORAL
Status: DISCONTINUED | OUTPATIENT
Start: 2024-04-26 | End: 2024-04-30 | Stop reason: HOSPADM

## 2024-04-26 RX ORDER — POLYETHYLENE GLYCOL 3350 17 G/17G
17 POWDER, FOR SOLUTION ORAL 3 TIMES DAILY PRN
Status: DISCONTINUED | OUTPATIENT
Start: 2024-04-26 | End: 2024-04-30

## 2024-04-26 RX ORDER — SODIUM CHLORIDE 0.9 % (FLUSH) 0.9 %
10 SYRINGE (ML) INJECTION
Status: CANCELLED | OUTPATIENT
Start: 2024-04-26

## 2024-04-26 RX ORDER — ENOXAPARIN SODIUM 100 MG/ML
30 INJECTION SUBCUTANEOUS EVERY 24 HOURS
Status: DISCONTINUED | OUTPATIENT
Start: 2024-04-27 | End: 2024-04-29

## 2024-04-26 RX ORDER — INSULIN ASPART 100 [IU]/ML
0-10 INJECTION, SOLUTION INTRAVENOUS; SUBCUTANEOUS
Status: DISCONTINUED | OUTPATIENT
Start: 2024-04-26 | End: 2024-04-30 | Stop reason: HOSPADM

## 2024-04-26 RX ORDER — SUCRALFATE 1 G/1
1 TABLET ORAL 4 TIMES DAILY
Status: DISCONTINUED | OUTPATIENT
Start: 2024-04-26 | End: 2024-04-26 | Stop reason: HOSPADM

## 2024-04-26 RX ORDER — INSULIN ASPART 100 [IU]/ML
0-10 INJECTION, SOLUTION INTRAVENOUS; SUBCUTANEOUS
Status: DISCONTINUED | OUTPATIENT
Start: 2024-04-26 | End: 2024-04-26 | Stop reason: HOSPADM

## 2024-04-26 RX ORDER — SIMETHICONE 80 MG
1 TABLET,CHEWABLE ORAL 4 TIMES DAILY PRN
Status: CANCELLED | OUTPATIENT
Start: 2024-04-26

## 2024-04-26 RX ORDER — ONDANSETRON 4 MG/1
8 TABLET, ORALLY DISINTEGRATING ORAL EVERY 8 HOURS PRN
Status: DISCONTINUED | OUTPATIENT
Start: 2024-04-26 | End: 2024-04-30 | Stop reason: HOSPADM

## 2024-04-26 RX ORDER — ACETAMINOPHEN 325 MG/1
650 TABLET ORAL EVERY 4 HOURS PRN
Status: DISCONTINUED | OUTPATIENT
Start: 2024-04-26 | End: 2024-04-30 | Stop reason: HOSPADM

## 2024-04-26 RX ORDER — AMLODIPINE BESYLATE 10 MG/1
10 TABLET ORAL DAILY
Status: DISCONTINUED | OUTPATIENT
Start: 2024-04-27 | End: 2024-04-28

## 2024-04-26 RX ORDER — BISACODYL 10 MG/1
10 SUPPOSITORY RECTAL DAILY PRN
Status: CANCELLED | OUTPATIENT
Start: 2024-04-26

## 2024-04-26 RX ORDER — MORPHINE SULFATE 4 MG/ML
2 INJECTION, SOLUTION INTRAMUSCULAR; INTRAVENOUS EVERY 6 HOURS PRN
Status: CANCELLED | OUTPATIENT
Start: 2024-04-26

## 2024-04-26 RX ORDER — ALUMINUM HYDROXIDE, MAGNESIUM HYDROXIDE, AND SIMETHICONE 1200; 120; 1200 MG/30ML; MG/30ML; MG/30ML
30 SUSPENSION ORAL 4 TIMES DAILY PRN
Status: CANCELLED | OUTPATIENT
Start: 2024-04-26

## 2024-04-26 RX ORDER — PANTOPRAZOLE SODIUM 40 MG/1
40 TABLET, DELAYED RELEASE ORAL 2 TIMES DAILY
Status: DISCONTINUED | OUTPATIENT
Start: 2024-04-26 | End: 2024-04-30 | Stop reason: HOSPADM

## 2024-04-26 RX ORDER — ONDANSETRON HYDROCHLORIDE 2 MG/ML
4 INJECTION, SOLUTION INTRAVENOUS EVERY 8 HOURS PRN
Status: CANCELLED | OUTPATIENT
Start: 2024-04-26

## 2024-04-26 RX ORDER — IBUPROFEN 200 MG
24 TABLET ORAL
Status: CANCELLED | OUTPATIENT
Start: 2024-04-26

## 2024-04-26 RX ORDER — ALUMINUM HYDROXIDE, MAGNESIUM HYDROXIDE, AND SIMETHICONE 1200; 120; 1200 MG/30ML; MG/30ML; MG/30ML
30 SUSPENSION ORAL 4 TIMES DAILY PRN
Status: DISCONTINUED | OUTPATIENT
Start: 2024-04-26 | End: 2024-04-30 | Stop reason: HOSPADM

## 2024-04-26 RX ORDER — ACETAMINOPHEN 325 MG/1
650 TABLET ORAL EVERY 4 HOURS PRN
Status: CANCELLED | OUTPATIENT
Start: 2024-04-26

## 2024-04-26 RX ORDER — SIMETHICONE 80 MG
1 TABLET,CHEWABLE ORAL 4 TIMES DAILY PRN
Status: DISCONTINUED | OUTPATIENT
Start: 2024-04-26 | End: 2024-04-30 | Stop reason: HOSPADM

## 2024-04-26 RX ORDER — BISACODYL 10 MG/1
10 SUPPOSITORY RECTAL DAILY PRN
Status: DISCONTINUED | OUTPATIENT
Start: 2024-04-26 | End: 2024-04-30

## 2024-04-26 RX ORDER — HYDROCODONE BITARTRATE AND ACETAMINOPHEN 5; 325 MG/1; MG/1
1 TABLET ORAL EVERY 6 HOURS PRN
Status: CANCELLED | OUTPATIENT
Start: 2024-04-26

## 2024-04-26 RX ORDER — IBUPROFEN 200 MG
24 TABLET ORAL
Status: DISCONTINUED | OUTPATIENT
Start: 2024-04-26 | End: 2024-04-30 | Stop reason: HOSPADM

## 2024-04-26 RX ORDER — INSULIN ASPART 100 [IU]/ML
0-10 INJECTION, SOLUTION INTRAVENOUS; SUBCUTANEOUS
Status: CANCELLED | OUTPATIENT
Start: 2024-04-26

## 2024-04-26 RX ORDER — HYDROCODONE BITARTRATE AND ACETAMINOPHEN 5; 325 MG/1; MG/1
1 TABLET ORAL EVERY 6 HOURS PRN
Status: DISCONTINUED | OUTPATIENT
Start: 2024-04-26 | End: 2024-04-30 | Stop reason: HOSPADM

## 2024-04-26 RX ORDER — POLYETHYLENE GLYCOL 3350 17 G/17G
17 POWDER, FOR SOLUTION ORAL 3 TIMES DAILY PRN
Status: CANCELLED | OUTPATIENT
Start: 2024-04-26

## 2024-04-26 RX ORDER — TALC
9 POWDER (GRAM) TOPICAL NIGHTLY PRN
Status: DISCONTINUED | OUTPATIENT
Start: 2024-04-26 | End: 2024-04-30 | Stop reason: HOSPADM

## 2024-04-26 RX ORDER — ONDANSETRON HYDROCHLORIDE 2 MG/ML
4 INJECTION, SOLUTION INTRAVENOUS EVERY 8 HOURS PRN
Status: DISCONTINUED | OUTPATIENT
Start: 2024-04-26 | End: 2024-04-30 | Stop reason: HOSPADM

## 2024-04-26 RX ORDER — SUCRALFATE 1 G/1
1 TABLET ORAL 4 TIMES DAILY
Status: DISCONTINUED | OUTPATIENT
Start: 2024-04-26 | End: 2024-04-30 | Stop reason: HOSPADM

## 2024-04-26 RX ORDER — PANTOPRAZOLE SODIUM 40 MG/1
40 TABLET, DELAYED RELEASE ORAL 2 TIMES DAILY
Status: DISCONTINUED | OUTPATIENT
Start: 2024-04-26 | End: 2024-04-26 | Stop reason: HOSPADM

## 2024-04-26 RX ORDER — GLUCAGON 1 MG
1 KIT INJECTION
Status: DISCONTINUED | OUTPATIENT
Start: 2024-04-26 | End: 2024-04-30 | Stop reason: HOSPADM

## 2024-04-26 RX ORDER — SODIUM CHLORIDE 0.9 % (FLUSH) 0.9 %
10 SYRINGE (ML) INJECTION
Status: DISCONTINUED | OUTPATIENT
Start: 2024-04-26 | End: 2024-04-30 | Stop reason: HOSPADM

## 2024-04-26 RX ORDER — PANTOPRAZOLE SODIUM 40 MG/1
40 TABLET, DELAYED RELEASE ORAL 2 TIMES DAILY
Status: CANCELLED | OUTPATIENT
Start: 2024-04-26

## 2024-04-26 RX ORDER — NALOXONE HCL 0.4 MG/ML
0.02 VIAL (ML) INJECTION
Status: CANCELLED | OUTPATIENT
Start: 2024-04-26

## 2024-04-26 RX ORDER — NALOXONE HCL 0.4 MG/ML
0.02 VIAL (ML) INJECTION
Status: DISCONTINUED | OUTPATIENT
Start: 2024-04-26 | End: 2024-04-30 | Stop reason: HOSPADM

## 2024-04-26 RX ORDER — GLUCAGON 1 MG
1 KIT INJECTION
Status: CANCELLED | OUTPATIENT
Start: 2024-04-26

## 2024-04-26 RX ORDER — AMLODIPINE BESYLATE 5 MG/1
10 TABLET ORAL DAILY
Status: CANCELLED | OUTPATIENT
Start: 2024-04-27

## 2024-04-26 RX ORDER — ONDANSETRON 4 MG/1
8 TABLET, ORALLY DISINTEGRATING ORAL EVERY 8 HOURS PRN
Status: CANCELLED | OUTPATIENT
Start: 2024-04-26

## 2024-04-26 RX ORDER — IPRATROPIUM BROMIDE AND ALBUTEROL SULFATE 2.5; .5 MG/3ML; MG/3ML
3 SOLUTION RESPIRATORY (INHALATION) EVERY 6 HOURS PRN
Status: CANCELLED | OUTPATIENT
Start: 2024-04-26

## 2024-04-26 RX ORDER — IPRATROPIUM BROMIDE AND ALBUTEROL SULFATE 2.5; .5 MG/3ML; MG/3ML
3 SOLUTION RESPIRATORY (INHALATION) EVERY 6 HOURS PRN
Status: DISCONTINUED | OUTPATIENT
Start: 2024-04-26 | End: 2024-04-30 | Stop reason: HOSPADM

## 2024-04-26 RX ORDER — SUCRALFATE 1 G/1
1 TABLET ORAL 4 TIMES DAILY
Status: CANCELLED | OUTPATIENT
Start: 2024-04-26

## 2024-04-26 RX ORDER — AMLODIPINE BESYLATE 5 MG/1
10 TABLET ORAL DAILY
Status: DISCONTINUED | OUTPATIENT
Start: 2024-04-26 | End: 2024-04-26 | Stop reason: HOSPADM

## 2024-04-26 RX ORDER — ENOXAPARIN SODIUM 100 MG/ML
30 INJECTION SUBCUTANEOUS EVERY 24 HOURS
Status: CANCELLED | OUTPATIENT
Start: 2024-04-26

## 2024-04-26 RX ADMIN — SUCRALFATE 1 G: 1 TABLET ORAL at 09:04

## 2024-04-26 RX ADMIN — PIPERACILLIN AND TAZOBACTAM 4.5 G: 4; .5 INJECTION, POWDER, LYOPHILIZED, FOR SOLUTION INTRAVENOUS; PARENTERAL at 01:04

## 2024-04-26 RX ADMIN — PANTOPRAZOLE SODIUM 40 MG: 40 TABLET, DELAYED RELEASE ORAL at 09:04

## 2024-04-26 RX ADMIN — SODIUM CHLORIDE, POTASSIUM CHLORIDE, SODIUM LACTATE AND CALCIUM CHLORIDE: 600; 310; 30; 20 INJECTION, SOLUTION INTRAVENOUS at 08:04

## 2024-04-26 RX ADMIN — HYDROCODONE BITARTRATE AND ACETAMINOPHEN 1 TABLET: 5; 325 TABLET ORAL at 08:04

## 2024-04-26 RX ADMIN — LEUCINE, PHENYLALANINE, LYSINE, METHIONINE, ISOLEUCINE, VALINE, HISTIDINE, THREONINE, TRYPTOPHAN, ALANINE, GLYCINE, ARGININE, PROLINE, SERINE, TYROSINE, DEXTROSE: 311; 238; 247; 170; 255; 247; 204; 179; 77; 880; 438; 489; 289; 213; 17; 5 INJECTION INTRAVENOUS at 01:04

## 2024-04-26 RX ADMIN — PIPERACILLIN AND TAZOBACTAM 4.5 G: 4; .5 INJECTION, POWDER, LYOPHILIZED, FOR SOLUTION INTRAVENOUS; PARENTERAL at 09:04

## 2024-04-26 RX ADMIN — PANTOPRAZOLE SODIUM 40 MG: 40 TABLET, DELAYED RELEASE ORAL at 08:04

## 2024-04-26 RX ADMIN — SUCRALFATE 1 G: 1 TABLET ORAL at 08:04

## 2024-04-26 RX ADMIN — LEUCINE, PHENYLALANINE, LYSINE, METHIONINE, ISOLEUCINE, VALINE, HISTIDINE, THREONINE, TRYPTOPHAN, ALANINE, GLYCINE, ARGININE, PROLINE, SERINE, TYROSINE, DEXTROSE: 311; 238; 247; 170; 255; 247; 204; 179; 77; 880; 438; 489; 289; 213; 17; 5 INJECTION INTRAVENOUS at 08:04

## 2024-04-26 RX ADMIN — AMLODIPINE BESYLATE 10 MG: 5 TABLET ORAL at 08:04

## 2024-04-26 RX ADMIN — SUCRALFATE 1 G: 1 TABLET ORAL at 01:04
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
## 2024-04-26 NOTE — PT/OT/SLP EVAL
Occupational Therapy Evaluation  Observation    Name: Heath Holder  MRN: [id number]  Admitting Diagnosis: <principal problem not specified>  Recent Surgery: * No surgery found *      Recommendations:     Discharge Recommendations:    Level of Assistance Recommended:  none  Discharge Equipment Recommendations: other (see comments) (possible RW)  Barriers to discharge: Other (Comment) (current medical status and deconditioning)    Assessment:     Heath Holder is a 68 y.o. male with a medical diagnosis of presyncopal episode. Pt has a PMH of diabetes and hypertension. At baseline pt is independent with BADLS and functional mobility. He lives alone in a H with one step to enter and no rails. He has a walk-in shower. He presents with performance deficits affecting function including impaired endurance, weakness, impaired self care skills, impaired functional mobility, impaired balance. Pt would benefit from low intensity therapy services during his stay to return to baseline.     Rehab Prognosis: Good; patient would benefit from acute skilled OT services to address these deficits and reach maximum level of function.    Plan:     Patient to be seen  (5-6x/wk; QD) to address the above listed problems via self-care/home management, therapeutic activities, therapeutic exercises  Plan of Care Expires: 05/03/24  Plan of Care Reviewed with: patient    Subjective     Chief Complaint: weakness and dizziness  Patient Comments/Goals: Return home   Pain/Comfort:  Pain Rating 1: 0/10    Patients cultural, spiritual, Mandaeism conflicts given the current situation: no    Social History:  Living Environment: Patient lives alone in a single story home, number of outside stairs: 1 no railings, walk-in shower.  Prior Level of Function: Prior to admission, patient was independent with ADLs and functional mobility.   Roles and Routines: Retired and drives occasionally   Equipment Used at Home: none  DME owned (not currently used):  none  Assistance Upon Discharge: none, has not needed.    Objective:     Communicated with NSG prior to session. Patient found HOB elevated with bed alarm, blood pressure cuff, peripheral IV upon OT entry to room.    General Precautions: Standard, fall   Orthopedic Precautions:N/A   Braces: N/A  Respiratory Status: Room air    Occupational Performance    Bed Mobility:  Rolling/Turning to Right with independence  Scooting anteriorly to EOB to have both feet planted on floor: independence  Supine to sit from right side of bed with independence  Sit to Supine with independence on right side of bed    Functional Mobility/Transfers:  Sit <> Stand Transfer with stand by assistance with rolling walker  Toilet Transfer Step Transfer technique with stand by assistance with rolling walker  Functional Mobility: 20ft using a RW and a gait belt     Activities of Daily Living:  Grooming: stand by assistance    Toileting: stand by assistance      Cognitive/Visual Perceptual:  Cognitive/Psychosocial Skills:     -       Oriented to: Person, Place, Time, and Situation     Physical Exam:  Upper Extremity Range of Motion:     -       Right Upper Extremity: WNL  -       Left Upper Extremity: WNL  Upper Extremity Strength:    -       Right Upper Extremity: WFL  -       Left Upper Extremity: WFL   Strength:    -       Right Upper Extremity: WFL  -       Left Upper Extremity: WFL  Gross motor coordination:   WFL    AMPAC 6 Click ADL:  AMPAC Total Score: 19    Treatment & Education:  Patient educated on role of OT, POC and goals for therapy      Patient clear to ambulate to/from bathroom with RN/PCT, assist xSBA using a RW and a gait belt .    Patient left HOB elevated with all lines intact, call button in reach, RN notified, and bed alarm on.    GOALS:   Multidisciplinary Problems       Occupational Therapy Goals          Problem: Occupational Therapy    Goal Priority Disciplines Outcome Interventions   Occupational Therapy Goal      OT, PT/OT Progressing    Description: Goals to be met by: Discharge     Patient will increase functional independence with ADLs by performing:    LE Dressing with Modified [place].  Grooming while standing with Modified [place].  Toileting from toilet with Modified [place] for hygiene and clothing management.   Bathing from shower chair/bench with Modified [place].  Toilet transfer to toilet with Modified [place].                         History:     Past Medical History:   Diagnosis Date    Chronic low back pain with left-sided sciatica     Diabetes mellitus     HTN (hypertension)          Past Surgical History:   Procedure Laterality Date    COLONOSCOPY N/A 4/10/2024    Procedure: COLON;  Surgeon: Ravi Petit MD;  Location: [facility] ENDOSCOPY;  Service: Gastroenterology;  Laterality: N/A;    EGD, WITH CLOSED BIOPSY  4/9/2024    Procedure: EGD, WITH CLOSED BIOPSY;  Surgeon: Ravi Petit MD;  Location: [facility] ENDOSCOPY;  Service: Gastroenterology;;    ESOPHAGOGASTRODUODENOSCOPY N/A 4/9/2024    Procedure: EGD;  Surgeon: Ravi Petit MD;  Location: [facility] ENDOSCOPY;  Service: Gastroenterology;  Laterality: N/A;    HIP SURGERY Left     LAPAROSCOPIC CHOLECYSTECTOMY N/A 4/22/2024    Procedure: CHOLECYSTECTOMY, LAPAROSCOPIC;  Surgeon: Demetrice Gardner MD;  Location: [facility];  Service: General;  Laterality: N/A;    ORIF HIP FRACTURE Right     >10 years       Time Tracking:     OT Date of Treatment: 04/26/24  OT Start Time: 1005  OT Stop Time: 1030  OT Total Time (min): 25 min    Billable Minutes: Evaluation 25    4/26/2024

## 2024-04-26 NOTE — PLAN OF CARE
Problem: Adult Inpatient Plan of Care  Goal: Plan of Care Review  Outcome: Progressing  Flowsheets (Taken 4/26/2024 1052)  Plan of Care Reviewed With: patient  Goal: Patient-Specific Goal (Individualized)  Outcome: Progressing  Flowsheets (Taken 4/26/2024 1052)  Individualized Care Needs: pain management, monitor output, advance diet as tolerated, monitor BMs, talk to surgeon about possible transfer if they see fit  Anxieties, Fears or Concerns: monitor pain in abdomen  Patient/Family-Specific Goals (Include Timeframe): return home feeling better  Goal: Absence of Hospital-Acquired Illness or Injury  Outcome: Progressing  Intervention: Identify and Manage Fall Risk  Flowsheets (Taken 4/26/2024 1052)  Safety Promotion/Fall Prevention:   assistive device/personal item within reach   bed alarm set   instructed to call staff for mobility   side rails raised x 3   room near unit station  Intervention: Prevent Skin Injury  Flowsheets (Taken 4/26/2024 1052)  Body Position: sitting up in bed  Skin Protection:   incontinence pads utilized   transparent dressing maintained  Device Skin Pressure Protection:   absorbent pad utilized/changed   positioning supports utilized   tubing/devices free from skin contact  Intervention: Prevent and Manage VTE (Venous Thromboembolism) Risk  Flowsheets (Taken 4/26/2024 1052)  VTE Prevention/Management:   ambulation promoted   fluids promoted   bleeding precations maintained  Intervention: Prevent Infection  Flowsheets (Taken 4/26/2024 1052)  Infection Prevention:   cohorting utilized   hand hygiene promoted   equipment surfaces disinfected  Goal: Optimal Comfort and Wellbeing  Outcome: Progressing  Intervention: Monitor Pain and Promote Comfort  Flowsheets (Taken 4/26/2024 1052)  Pain Management Interventions:   care clustered   relaxation techniques promoted   quiet environment facilitated  Intervention: Provide Person-Centered Care  Flowsheets (Taken 4/26/2024 1052)  Trust  Relationship/Rapport:   care explained   choices provided   questions encouraged  Goal: Readiness for Transition of Care  Outcome: Progressing  Intervention: Mutually Develop Transition Plan  Flowsheets (Taken 4/26/2024 1052)  Communicated ZULEYMA with patient/caregiver: Date not available/Unable to determine     Problem: Infection  Goal: Absence of Infection Signs and Symptoms  Outcome: Progressing  Intervention: Prevent or Manage Infection  Flowsheets (Taken 4/26/2024 1052)  Infection Management: aseptic technique maintained     Problem: Diabetes Comorbidity  Goal: Blood Glucose Level Within Targeted Range  Outcome: Progressing  Intervention: Monitor and Manage Glycemia  Flowsheets (Taken 4/26/2024 1052)  Glycemic Management:   blood glucose monitored   oral hydration promoted   supplemental insulin given     Problem: Wound  Goal: Optimal Coping  Outcome: Progressing  Intervention: Support Patient and Family Response  Flowsheets (Taken 4/26/2024 1052)  Supportive Measures:   active listening utilized   self-responsibility promoted   verbalization of feelings encouraged  Family/Support System Care: self-care encouraged  Goal: Optimal Functional Ability  Outcome: Progressing  Intervention: Optimize Functional Ability  Flowsheets (Taken 4/26/2024 1052)  Activity Management: Ambulated to bathroom - L4  Activity Assistance Provided: assistance, 1 person  Goal: Absence of Infection Signs and Symptoms  Outcome: Progressing  Intervention: Prevent or Manage Infection  Flowsheets (Taken 4/26/2024 1052)  Infection Management: aseptic technique maintained  Goal: Improved Oral Intake  Outcome: Progressing  Intervention: Promote and Optimize Oral Intake  Flowsheets (Taken 4/26/2024 1052)  Oral Nutrition Promotion:   rest periods promoted   calorie-dense liquids provided  Nutrition Interventions: diet advanced  Goal: Optimal Pain Control and Function  Outcome: Progressing  Intervention: Prevent or Manage Pain  Flowsheets (Taken  4/26/2024 1052)  Sleep/Rest Enhancement:   awakenings minimized   regular sleep/rest pattern promoted  Pain Management Interventions:   care clustered   relaxation techniques promoted   quiet environment facilitated  Goal: Skin Health and Integrity  Outcome: Progressing  Intervention: Optimize Skin Protection  Flowsheets (Taken 4/26/2024 1052)  Pressure Reduction Techniques: frequent weight shift encouraged  Pressure Reduction Devices: positioning supports utilized  Skin Protection:   incontinence pads utilized   transparent dressing maintained  Activity Management: Ambulated to bathroom - L4  Head of Bed (HOB) Positioning: HOB at 20-30 degrees  Goal: Optimal Wound Healing  Outcome: Progressing  Intervention: Promote Wound Healing  Flowsheets (Taken 4/26/2024 1052)  Sleep/Rest Enhancement:   awakenings minimized   regular sleep/rest pattern promoted     Problem: Fall Injury Risk  Goal: Absence of Fall and Fall-Related Injury  Outcome: Progressing  Intervention: Identify and Manage Contributors  Flowsheets (Taken 4/26/2024 1052)  Self-Care Promotion:   independence encouraged   BADL personal objects within reach  Medication Review/Management: medications reviewed  Intervention: Promote Injury-Free Environment  Flowsheets (Taken 4/26/2024 1052)  Safety Promotion/Fall Prevention:   assistive device/personal item within reach   bed alarm set   instructed to call staff for mobility   side rails raised x 3   room near unit station     Problem: Skin Injury Risk Increased  Goal: Skin Health and Integrity  Outcome: Progressing  Intervention: Optimize Skin Protection  Flowsheets (Taken 4/26/2024 1052)  Pressure Reduction Techniques: frequent weight shift encouraged  Pressure Reduction Devices: positioning supports utilized  Skin Protection:   incontinence pads utilized   transparent dressing maintained  Activity Management: Ambulated to bathroom - L4  Head of Bed (HOB) Positioning: HOB at 20-30 degrees  Intervention: Promote  and Optimize Oral Intake  Flowsheets (Taken 4/26/2024 1052)  Oral Nutrition Promotion:   rest periods promoted   calorie-dense liquids provided  Nutrition Interventions: diet advanced     Problem: Gas Exchange Impaired  Goal: Optimal Gas Exchange  Outcome: Progressing  Intervention: Optimize Oxygenation and Ventilation  Flowsheets (Taken 4/26/2024 1052)  Airway/Ventilation Management: calming measures promoted  Head of Bed (HOB) Positioning: HOB at 20-30 degrees

## 2024-04-26 NOTE — PROGRESS NOTES
Unable to treat pt at this time d/t pt stating he is fatigued d/t getting up and going to the restroom multiple times since this morning. Will f/u in AM if schedule allows.

## 2024-04-26 NOTE — ED NOTES
Dr guaman in to update pt and wife..  pt reprots he still can not void for ua  after 2 l lr completed.

## 2024-04-26 NOTE — PROGRESS NOTES
Inpatient Nutrition Assessment    Admit Date: 4/25/2024   Total duration of encounter: 1 day   Patient Age: 68 y.o.    Nutrition Recommendation/Prescription     Continue NPO except with medications sips. 2. Continue 4.25% AA/Dextrose 5% IV 1000 mL at 75 mL/hr    Communication of Recommendations:       Nutrition Assessment     Malnutrition Assessment/Nutrition-Focused Physical Exam                                                                A minimum of two characteristics is recommended for diagnosis of either severe or non-severe malnutrition.    Chart Review    Reason Seen: continuous nutrition monitoring and length of stay    Malnutrition Screening Tool Results   Have you recently lost weight without trying?: Yes: 2-13 lbs  Have you been eating poorly because of a decreased appetite?: Yes   MST Score: 2   Diagnosis:  Ileus  Transaminitis  S/p laparoscopic cholecystectomy  Lactic acidosis  GILBERTO  Hyperglycemia 2/2 uncontrolled DM2  HTN    Relevant Medical History:    Chronic low back pain with left-sided sciatica      Diabetes mellitus      HTN (hypertension)        Scheduled Medications:  amLODIPine, 10 mg, Daily  enoxparin, 30 mg, Daily  pantoprazole, 40 mg, BID  piperacillin-tazobactam (Zosyn) IV (PEDS and ADULTS) (extended infusion is not appropriate), 4.5 g, Q8H  sucralfate, 1 g, QID    Continuous Infusions:  amino acid 4.25% - dextrose 5% solution, Last Rate: 75 mL/hr at 04/26/24 1321    PRN Medications:   Current Facility-Administered Medications:     acetaminophen, 650 mg, Oral, Q4H PRN    albuterol-ipratropium, 3 mL, Nebulization, Q6H PRN    aluminum-magnesium hydroxide-simethicone, 30 mL, Oral, QID PRN    bisacodyL, 10 mg, Rectal, Daily PRN    dextrose 10%, 12.5 g, Intravenous, PRN    dextrose 10%, 25 g, Intravenous, PRN    glucagon (human recombinant), 1 mg, Intramuscular, PRN    glucose, 16 g, Oral, PRN    glucose, 24 g, Oral, PRN    HYDROcodone-acetaminophen, 1 tablet, Oral, Q6H PRN    insulin aspart  "U-100, 0-10 Units, Subcutaneous, QID (AC + HS) PRN    melatonin, 9 mg, Oral, Nightly PRN    morphine, 2 mg, Intravenous, Q6H PRN    naloxone, 0.02 mg, Intravenous, PRN    ondansetron, 8 mg, Oral, Q8H PRN    ondansetron, 4 mg, Intravenous, Q8H PRN    polyethylene glycol, 17 g, Oral, TID PRN    simethicone, 1 tablet, Oral, QID PRN    sodium chloride 0.9%, 10 mL, Intravenous, PRN    Calorie Containing IV Medications: no significant kcals from medications at this time and Clinimix    Recent Labs   Lab 04/25/24  1818 04/25/24 2009 04/26/24  0413    137 144   K 4.1 4.6 3.7   CALCIUM 10.8* 10.4* 9.3   CHLORIDE 94* 94* 99   CO2 30 28 32*   BUN 30.8* 29.6* 33.8*   CREATININE 1.80* 1.76* 0.96   EGFRNORACEVR 41 42 >60   GLUCOSE 244* 212* 209*   BILITOT 1.2  --   --    ALKPHOS 103  --   --    ALT 90*  --   --    AST 99*  --   --    ALBUMIN 3.4  --   --    WBC 7.80  --  5.76   HGB 19.1*  --  17.8   HCT 58.3*  --  52.8*     Nutrition Orders:  Diet NPO Except for: Medication, Sips with Medication  amino acid 4.25% - dextrose 5% solution      Appetite/Oral Intake: NPO/NPO  Factors Affecting Nutritional Intake: abdominal distension, altered gastrointestinal function, and decreased appetite  Social Needs Impacting Access to Food: unable to assess at this time; will attempt on follow-up  Food/Scientology/Cultural Preferences: none reported  Food Allergies: none reported  Last Bowel Movement: 04/26/24  Wound(s):      Comments    Pt admitted with ileus. Pt NPO at this time. Pt on clinimix.     Anthropometrics    Height: 5' 7" (170.2 cm),    Last Weight: 59.3 kg (130 lb 11.7 oz) (04/25/24 2332), Weight Method: Bed Scale  BMI (Calculated): 20.5  BMI Classification: underweight (BMI less than 22 if >65 years of age)        Ideal Body Weight (IBW), Male: 148 lb     % Ideal Body Weight, Male (lb): 88.33 %                          Usual Weight Provided By: unable to obtain usual weight    Wt Readings from Last 5 Encounters:   04/25/24 " 59.3 kg (130 lb 11.7 oz)   04/22/24 57.6 kg (127 lb)   04/16/24 58.5 kg (129 lb)   04/09/24 52.2 kg (115 lb)   03/30/24 61 kg (134 lb 7.7 oz)     Weight Change(s) Since Admission:   Wt Readings from Last 1 Encounters:   04/25/24 2332 59.3 kg (130 lb 11.7 oz)   04/25/24 1744 52.2 kg (115 lb)   Admit Weight: 52.2 kg (115 lb) (04/25/24 1744), Weight Method: Stated    Estimated Needs     Kcal Needs: 1,779 kcal (30 kcal/kg/CBW)  Protein Needs: 77 gm (1.3 gm/kg/CBW)  Fluid Needs: 1,779 mL (1 mL/kcal)                       Enteral Nutrition     Patient not receiving enteral nutrition at this time.    Parenteral Nutrition     Standard Formula: Clinimix 4.25/5  Custom Formula: not applicable  Additives: none  Rate/Volume: 75 mL/hr, 1800 mL  Lipids: none  Total Nutrition Provided by Parenteral Nutrition:  Calories Provided  612 kcal/d, 34% needs   Protein Provided  76.5 g/d, 99% needs   Dextrose Provided  90 g/d, GIR 1.2 mg CHO/kg/min   Fluid Provided  1800 ml/d, 101% needs       Evaluation of Received Nutrient Intake    Calories: not meeting estimated needs  Protein: not meeting estimated needs    Patient Education     Not applicable.    Nutrition Diagnosis     PES: Altered GI function related to acute illness as evidenced by ileus. (new)         Nutrition Interventions     Intervention(s): modified composition of parenteral nutrition    Goal: Consume % of meals/snacks by follow-up. (new)      Nutrition Goals & Monitoring     Dietitian will monitor: parenteral nutrition intake, weight, weight change, glucose/endocrine profile, and gastrointestinal profile  Discharge planning: too early to determine; pending clinical course  Nutrition Risk/Follow-Up: moderate (follow-up in 3-5 days)   Please consult if re-assessment needed sooner.

## 2024-04-26 NOTE — PLAN OF CARE
Problem: Physical Therapy  Goal: Physical Therapy Goal  Description: Goals to be met by: Discharge      Patient will increase functional independence with mobility by performin. Supine to sit with [place]  2. Sit to supine with [place]  3. Sit to stand transfer with [place]  4. Gait  x 500+ feet with [place] and Modified [place] using No Assistive Device vs LRAD.     Outcome: Progressing

## 2024-04-26 NOTE — PT/OT/SLP PROGRESS
Physical Therapy Treatment Note           Name: Heath Holder    : 1955 (68 y.o.)  MRN: [id number]           TREATMENT SUMMARY AND RECOMMENDATIONS:    PT Received On: 24  PT Start Time: 1400     PT Stop Time: 1410  PT Total Time (min): 10 min     Subjective Assessment:   No complaints  Lethargic    Awake, alert, cooperative  Uncooperative    Agitated x c/o pain- abdomen    Appropriate  c/o fatigue    Confused  Treated at bedside     Emotionally labile  Treated in gym/dept.    Impulsive  Other:    Flat affect       Therapy Precautions:    Cognitive deficits  Spinal precautions    Collar - hard  Sternal precautions    Collar - soft   TLSO    Fall risk  LSO    Hip precautions - posterior  Knee immobilizer    Hip precautions - anterior  WBAT    Impaired communication  Partial weightbearing    Oxygen  TTWB    PEG tube  NWB    Visual deficits  Other:    Hearing deficits          Treatment Objectives:     Mobility Training:   Assist level Comments    Bed mobility     Transfer CGA Commode transfer with RW    Gait     Sit to stand transitions     Sitting balance     Standing balance      Wheelchair mobility     Car transfer     Other:          Therapeutic Exercise:   Exercise Sets Reps Comments                               Additional Comments:  Pt requested commode upon arrival    Assessment: Patient tolerated session fair    PT Plan: continue  Revisions made to plan of care: No    GOALS:   Multidisciplinary Problems       Physical Therapy Goals          Problem: Physical Therapy    Goal Priority Disciplines Outcome Goal Variances Interventions   Physical Therapy Goal     PT, PT/OT Progressing     Description: Goals to be met by: Discharge      Patient will increase functional independence with mobility by performin. Supine to sit with [place]  2. Sit to supine with [place]  3. Sit to stand transfer with [place]  4. Gait  x 500+ feet with [place] and Modified [place]  using No Assistive Device vs LRAD.                          Skilled PT Minutes Provided: 10   Communication with Treatment Team:     Equipment recommendations:       At end of treatment, patient remained:   Up in chair     Up in wheelchair in room    In bed    With alarm activated    Bed rails up    Call bell in reach     Family/friends present    Restraints secured properly   x In bathroom with CNA/RN notified    Nurse aware    In gym with therapist/tech    Other:

## 2024-04-26 NOTE — DISCHARGE SUMMARY
[facility] - [facility] Surgical Unit  HOSPITAL MEDICINE - DISCHARGE SUMMARY    Patient Name: Heath Holder  MRN: [id number]  Admission Date: 4/25/2024  Discharge Date: 04/26/2024  Hospital Length of Stay: 0 days  Discharge Provider: HENRRY Liriano  Primary Care Provider: Marilu Amezquita FNP    HOSPITAL COURSE     Patient is a 68 year old male with a past medical history of HTN and DM2 who presented to the ER with complaints of near syncopal episode. He experienced associated diaphoresis and lightheadedness, but denies LOC or head trauma. Patient did recently undergo a laparoscopic cholecystectomy on 04/22/2024. He reports since his surgery he has had poor appetite, abdominal bloating, and no bowel movement.  He was noted to have blood pressures of 80s over 60s in the ER but has since improved.  Initial labs were significant for GILBERTO, hyperglycemia, elevated AST and ALT, as well as elevated lactic acid.  CXR without any acute findings.  CT abdomen and pelvis without contrast revealed postsurgical changes of prior cholecystectomy with locules of air in the gallbladder fossa, developing abscess is not entirely excluded, diffuse pneumoperitoneum, and findings suggestive of an ileus.  Patient was admitted to Hospital Medicine Service for further workup and management.  Upon my exam this morning patient was NPO and receiving IV fluids.  He did report a large loose bowel movement this morning.  We will discontinue IV fluids and start on full liquid diet.  We will attempt to reach out to his surgical team and see if they are able to review CT obtained yesterday.     After speaking with one of patient's surgeons, they recommended patient be transferred back to their facility for further evaluation and possible drainage of suspected abscess.  Patient was started on empiric Zosyn and made NPO.  Clinimix was added for TPN as patient has had poor PO intake over the past few days.  Patient does report multiple bowel  movements today.  Patient has been accepted back to [facility] will be admitted to general surgery.    PHYSICAL EXAM     Most Recent Vital Signs:  Temp: 98.7 °F (37.1 °C) (04/26/24 0741)  Pulse: 91 (04/26/24 0741)  Resp: 18 (04/26/24 0741)  BP: (!) 167/93 (04/26/24 0741)  SpO2: 95 % (04/26/24 0741)     GENERAL: In no acute distress, afebrile  HEENT: Atraumatic, normocephalic, moist mucous membranes   CHEST: Clear to auscultation bilaterally  HEART: S1, S2, no appreciable murmur  ABDOMEN: Soft, nontender, BS +  MSK: Warm, no lower extremity edema, no clubbing or cyanosis  NEUROLOGIC: Alert and oriented x4, moving all extremities with good strength   INTEGUMENTARY: No obvious skin rash, well healing incisions to abdomen   PSYCHIATRY: Appropriate mood and affect     DISCHARGE DIAGNOSIS     Ileus  Transaminitis  S/p laparoscopic cholecystectomy on 04/22/2024  Suspected intra-abdominal abscess postop  Reported multiple BM today  Monitor output  Antiemetics as needed  Empiric Zosyn  NPO, start Clinimix  Transfer to [facility] for general surgery services, has been accepted by General surgery     Lactic acidosis - resolved  No leukocytosis or leukopenia, afebrile, nontender on exam  Above ileus noted on CT  Questionable abdominal abscess recent cholecystectomy  Started on empiric Zosyn    GILBERTO  Encourage oral hydration as tolerated     Hyperglycemia 2/2 uncontrolled DM2  Metformin for now for possible contrast use  Moderate dose SSI  Most recent A1c on 03/18/2024 was 9.3%     HTN  Resume home amlodipine  Hold home lisinopril due to above GILBERTO    Patient's screen for food insecurity, housing instability, transportation needs, utility difficulties, and interpersonal safety. Social work consulted for  transfer to higher level of care facility with general surgery access.    _____________________________________________________________________________    DISCHARGE MED REC     Current Discharge Medication List        CONTINUE these medications  which have NOT CHANGED    Details   HYDROcodone-acetaminophen (NORCO) 5-325 mg per tablet Take 1 tablet by mouth every 6 (six) hours as needed for Pain.  Qty: 15 tablet, Refills: 0    Comments: Quantity prescribed more than 7 day supply? Yes, quantity medically necessary      lisinopriL (PRINIVIL,ZESTRIL) 40 MG tablet Take 1 tablet (40 mg total) by mouth once daily.  Qty: 90 tablet, Refills: 1    Comments: .  Associated Diagnoses: Primary hypertension      amLODIPine (NORVASC) 10 MG tablet Take 1 tablet (10 mg total) by mouth once daily.  Qty: 90 tablet, Refills: 3    Comments: .      metFORMIN (GLUCOPHAGE) 1000 MG tablet Take 1 tablet (1,000 mg total) by mouth 2 (two) times daily with meals.  Qty: 180 tablet, Refills: 1    Associated Diagnoses: Controlled type 2 diabetes mellitus without complication, without long-term current use of insulin      pantoprazole (PROTONIX) 40 MG tablet Take 1 tablet (40 mg total) by mouth 2 (two) times daily.  Qty: 60 tablet, Refills: 0      sucralfate (CARAFATE) 1 gram tablet Take 1 tablet (1 g total) by mouth 4 (four) times daily.  Qty: 120 tablet, Refills: 3      traMADoL (ULTRAM) 50 mg tablet Take 1 tablet (50 mg total) by mouth every 6 (six) hours as needed for Pain.  Qty: 15 tablet, Refills: 0    Comments: Quantity prescribed more than 7 day supply? No            CONSULTS     FOLLOW UP     DISCHARGE INSTRUCTIONS     Explained in detail to the patient about the discharge plan, medications, and follow-up visits. Pt understands and agrees with the treatment plan.  Discharged Condition: stable  Diet as tolerated  Activities as tolerated  Discharge to: Discharged to Other Facility    TIME SPENT ON DISCHARGE   35 minutes    HENRRY Liriano  Internal Medicine  Department of Hospital Medicine [facility]    This document was created using electronic dictation services.  Please excuse any errors that may have been made.  Contact me if any questions  regarding documentation to clarify verbiage.

## 2024-04-26 NOTE — PLAN OF CARE
Problem: Adult Inpatient Plan of Care  Goal: Plan of Care Review  Outcome: Progressing  Flowsheets (Taken 4/26/2024 0154)  Plan of Care Reviewed With: patient  Goal: Patient-Specific Goal (Individualized)  Outcome: Progressing  Flowsheets (Taken 4/26/2024 0154)  Individualized Care Needs: pain mangmt , monitor labs  Anxieties, Fears or Concerns: abdominal pain and urination  Patient/Family-Specific Goals (Include Timeframe): discharge planning  Goal: Absence of Hospital-Acquired Illness or Injury  Outcome: Progressing  Intervention: Identify and Manage Fall Risk  Flowsheets (Taken 4/26/2024 0154)  Safety Promotion/Fall Prevention:   assistive device/personal item within reach   bed alarm set   Fall Risk reviewed with patient/family  Intervention: Prevent Skin Injury  Flowsheets (Taken 4/26/2024 0154)  Body Position: position changed independently  Skin Protection: incontinence pads utilized  Intervention: Prevent and Manage VTE (Venous Thromboembolism) Risk  Flowsheets (Taken 4/26/2024 0154)  VTE Prevention/Management:   remove, assess skin, and reapply compression stockings   ambulation promoted  Intervention: Prevent Infection  Flowsheets (Taken 4/26/2024 0154)  Infection Prevention:   hand hygiene promoted   rest/sleep promoted  Goal: Optimal Comfort and Wellbeing  Outcome: Progressing  Intervention: Monitor Pain and Promote Comfort  Flowsheets (Taken 4/26/2024 0154)  Pain Management Interventions:   medication offered   pillow support provided   quiet environment facilitated   pain management plan reviewed with patient/caregiver  Intervention: Provide Person-Centered Care  Flowsheets (Taken 4/26/2024 0154)  Trust Relationship/Rapport:   care explained   questions answered   questions encouraged  Goal: Readiness for Transition of Care  Outcome: Progressing  Intervention: Mutually Develop Transition Plan  Flowsheets (Taken 4/26/2024 0154)  Communicated ZULEYMA with patient/caregiver: Date not available/Unable to  determine  Do you expect to return to your current living situation?: Yes  Do you have help at home or someone to help you manage your care at home?: Yes     Problem: Infection  Goal: Absence of Infection Signs and Symptoms  Outcome: Progressing  Intervention: Prevent or Manage Infection  Flowsheets (Taken 4/26/2024 0154)  Fever Reduction/Comfort Measures: (ivf) other (see comments)  Infection Management: aseptic technique maintained     Problem: Diabetes Comorbidity  Goal: Blood Glucose Level Within Targeted Range  Outcome: Progressing  Intervention: Monitor and Manage Glycemia  Flowsheets (Taken 4/26/2024 0154)  Glycemic Management:   blood glucose monitored   insulin infusion adjusted     Problem: Wound  Goal: Optimal Coping  Outcome: Progressing  Goal: Optimal Functional Ability  Outcome: Progressing  Intervention: Optimize Functional Ability  Flowsheets (Taken 4/26/2024 0154)  Activity Management: Walk with assistive devise and /or staff member - L3  Activity Assistance Provided: assistance, 1 person  Goal: Absence of Infection Signs and Symptoms  Outcome: Progressing  Intervention: Prevent or Manage Infection  Flowsheets (Taken 4/26/2024 0154)  Fever Reduction/Comfort Measures: (ivf) other (see comments)  Infection Management: aseptic technique maintained  Goal: Improved Oral Intake  Outcome: Progressing  Goal: Optimal Pain Control and Function  Outcome: Progressing  Intervention: Prevent or Manage Pain  Flowsheets (Taken 4/26/2024 0154)  Pain Management Interventions:   medication offered   pillow support provided   quiet environment facilitated   pain management plan reviewed with patient/caregiver  Goal: Skin Health and Integrity  Outcome: Progressing  Intervention: Optimize Skin Protection  Flowsheets (Taken 4/26/2024 0154)  Pressure Reduction Techniques: frequent weight shift encouraged  Pressure Reduction Devices: positioning supports utilized  Skin Protection: incontinence pads utilized  Activity  Management: Walk with assistive devise and /or staff member - L3  Head of Bed (HOB) Positioning: HOB elevated  Goal: Optimal Wound Healing  Outcome: Progressing     Problem: Fall Injury Risk  Goal: Absence of Fall and Fall-Related Injury  Outcome: Progressing  Intervention: Identify and Manage Contributors  Flowsheets (Taken 4/26/2024 0154)  Self-Care Promotion: independence encouraged  Medication Review/Management: medications reviewed  Intervention: Promote Injury-Free Environment  Flowsheets (Taken 4/26/2024 0154)  Safety Promotion/Fall Prevention:   assistive device/personal item within reach   bed alarm set   Fall Risk reviewed with patient/family     Problem: Skin Injury Risk Increased  Goal: Skin Health and Integrity  Outcome: Progressing  Intervention: Optimize Skin Protection  Flowsheets (Taken 4/26/2024 0154)  Pressure Reduction Techniques: frequent weight shift encouraged  Pressure Reduction Devices: positioning supports utilized  Skin Protection: incontinence pads utilized  Activity Management: Walk with assistive devise and /or staff member - L3  Head of Bed (HOB) Positioning: HOB elevated  Intervention: Promote and Optimize Oral Intake  Flowsheets (Taken 4/26/2024 0154)  Oral Nutrition Promotion: rest periods promoted     Problem: Gas Exchange Impaired  Goal: Optimal Gas Exchange  Outcome: Progressing  Intervention: Optimize Oxygenation and Ventilation  Flowsheets (Taken 4/26/2024 0154)  Airway/Ventilation Management: calming measures promoted  Head of Bed (HOB) Positioning: HOB elevated

## 2024-04-26 NOTE — H&P
[facility]  [facility] MEDICINE - H&P ADMISSION NOTE    Patient Name: Heath Holder  MRN: [id number]  Patient Class: OP- Observation   Admission Date: 4/25/2024   Admitting Physician: SHELLEY Service   Attending Physician: HENRRY Liriano  Primary Care Provider: Marilu Amezquita FNP  Face-to-Face encounter date: 04/26/2024      CHIEF COMPLAINT     Chief Complaint   Patient presents with    Fatigue     Pt presents after near syncopal episode this afternoon; pt had gallbladder removed on Monday; went home with Norco for pain, states took a pain pill this AM, laid back in bed; around 3pm he was getting out of bed, became very weak, diaphoretic, vomited x1, and near syncopal; initial bp 70's; received NS 500mL, Zofran 4mg      HISTORY OF PRESENTING ILLNESS     Patient is a 68 year old male with a past medical history of HTN and DM2 who presented to the ER with complaints of near syncopal episode. He experienced associated diaphoresis and lightheadedness, but denies LOC or head trauma. Patient did recently undergo a laparoscopic cholecystectomy on 04/22/2024. He reports since his surgery he has had poor appetite, abdominal bloating, and no bowel movement.  He was noted to have blood pressures of 80s over 60s in the ER but has since improved.  Initial labs were significant for GILBERTO, hyperglycemia, elevated AST and ALT, as well as elevated lactic acid.  CXR without any acute findings.  CT abdomen and pelvis without contrast revealed postsurgical changes of prior cholecystectomy with locules of air in the gallbladder fossa, developing abscess is not entirely excluded, diffuse pneumoperitoneum, and findings suggestive of an ileus.  Patient was admitted to Hospital Medicine Service for further workup and management.  Upon my exam this morning patient was NPO and receiving IV fluids.  He did report a large loose bowel movement this morning.  We will discontinue IV fluids and start on full liquid  diet.  We will attempt to reach out to his surgical team and see if they are able to review CT obtained yesterday.    PAST MEDICAL HISTORY     Past Medical History:   Diagnosis Date    Chronic low back pain with left-sided sciatica     Diabetes mellitus     HTN (hypertension)      PAST SURGICAL HISTORY     Past Surgical History:   Procedure Laterality Date    COLONOSCOPY N/A 4/10/2024    Procedure: COLON;  Surgeon: Ravi Petit MD;  Location: [facility] ENDOSCOPY;  Service: Gastroenterology;  Laterality: N/A;    EGD, WITH CLOSED BIOPSY  4/9/2024    Procedure: EGD, WITH CLOSED BIOPSY;  Surgeon: Ravi Petit MD;  Location: [facility] ENDOSCOPY;  Service: Gastroenterology;;    ESOPHAGOGASTRODUODENOSCOPY N/A 4/9/2024    Procedure: EGD;  Surgeon: Ravi Petit MD;  Location: [facility] ENDOSCOPY;  Service: Gastroenterology;  Laterality: N/A;    HIP SURGERY Left     LAPAROSCOPIC CHOLECYSTECTOMY N/A 4/22/2024    Procedure: CHOLECYSTECTOMY, LAPAROSCOPIC;  Surgeon: Demetrice Gardner MD;  Location: [facility];  Service: General;  Laterality: N/A;    ORIF HIP FRACTURE Right     >10 years     FAMILY HISTORY     Reviewed and noncontributory to this case    SOCIAL HISTORY     Social History     Socioeconomic History    Marital status:    Tobacco Use    Smoking status: Every Day     Current packs/day: 0.50     Average packs/day: 0.5 packs/day for 52.3 years (26.2 ttl pk-yrs)     Types: Cigarettes     Start date: 1/1/1972    Smokeless tobacco: Never   Substance and Sexual Activity    Alcohol use: Not Currently     Comment: occ    Drug use: Never     Social Determinants of Health     Financial Resource Strain: Low Risk  (4/10/2024)    Overall Financial Resource Strain (CARDIA)     Difficulty of Paying Living Expenses: Not hard at all   Food Insecurity: No Food Insecurity (4/10/2024)    Hunger Vital Sign     Worried About Running Out of Food in the Last Year: Never true     Ran Out of Food in the Last Year: Never true    Transportation Needs: No Transportation Needs (4/10/2024)    PRAPARE - Transportation     Lack of Transportation (Medical): No     Lack of Transportation (Non-Medical): No   Physical Activity: Sufficiently Active (2/15/2024)    Exercise Vital Sign     Days of Exercise per Week: 5 days     Minutes of Exercise per Session: 50 min   Stress: No Stress Concern Present (4/10/2024)    [employer] [place] [employer] - Occupational Stress Questionnaire     Feeling of Stress : Not at all   Social Connections: Moderately Isolated (2/15/2024)    Social Connection and Isolation Panel [NHANES]     Frequency of Communication with Friends and Family: More than three times a week     Frequency of Social Gatherings with Friends and Family: More than three times a week     Attends [employer] Services: Never     Active Member of Clubs or Organizations: No     Attends Club or Organization Meetings: Never     Marital Status:    Housing Stability: Low Risk  (4/10/2024)    Housing Stability Vital Sign     Unable to Pay for Housing in the Last Year: No     Number of Places Lived in the Last Year: 1     Unstable Housing in the Last Year: No     HOME MEDICATIONS     Prior to Admission medications    Medication Sig Start Date End Date Taking? Authorizing Provider   HYDROcodone-acetaminophen (NORCO) 5-325 mg per tablet Take 1 tablet by mouth every 6 (six) hours as needed for Pain. 4/22/24  Yes Bebe Snider MD   lisinopriL (PRINIVIL,ZESTRIL) 40 MG tablet Take 1 tablet (40 mg total) by mouth once daily. 2/15/24 2/14/25 Yes Marilu Amezquita FNP   amLODIPine (NORVASC) 10 MG tablet Take 1 tablet (10 mg total) by mouth once daily. 4/13/24 4/13/25  Bo Waldron MD   metFORMIN (GLUCOPHAGE) 1000 MG tablet Take 1 tablet (1,000 mg total) by mouth 2 (two) times daily with meals. 3/18/24   Marilu Amezquita FNP   pantoprazole (PROTONIX) 40 MG tablet Take 1 tablet (40 mg total) by mouth 2 (two) times daily. 4/12/24 5/12/24   Bo Waldron MD   sucralfate (CARAFATE) 1 gram tablet Take 1 tablet (1 g total) by mouth 4 (four) times daily. 4/12/24   Bo Waldron MD   traMADoL (ULTRAM) 50 mg tablet Take 1 tablet (50 mg total) by mouth every 6 (six) hours as needed for Pain. 4/16/24   Alberto Croft MD     ALLERGIES     Patient has no known allergies.    REVIEW OF SYSTEMS     Except as documented above, all other systems reviewed and negative    PHYSICAL EXAM     Vitals:    04/26/24 0741   BP: (!) 167/93   Pulse: 91   Resp: 18   Temp: 98.7 °F (37.1 °C)        General:  In no acute distress, resting comfortably  Head and neck:  Atraumatic, normocephalic, moist mucous membranes, supple neck  Chest:  Clear to auscultation bilaterally  Heart:  S1, S2, no appreciable murmur  Abdomen:  Soft, nontender, BS +  MSK:  Warm, no lower extremity edema, no clubbing or cyanosis  Neuro:  Alert and oriented x4, moving all extremities with good strength  Integumentary:  No obvious skin rash, well healing incisions to abdomen  Psychiatry:  Appropriate mood and affect    ASSESSMENT AND PLAN     Ileus  Transaminitis  S/p laparoscopic cholecystectomy on 04/22/2024  Advance to full liquid diet  Discontinue IV fluids  Reported large loose BM today  Monitor output  Antiemetics as needed  Will reach out to patient's surgeon and asked to review recent CT and give recommendations if possible    Lactic acidosis - resolved  No leukocytosis or leukopenia  Above ileus noted on CT  Questionable abdominal abscess recent cholecystectomy  Hold antibiotics for now    GILBERTO  Encourage oral hydration as tolerated    Hyperglycemia 2/2 uncontrolled DM2  Metformin for now for possible contrast use  Moderate dose SSI  Most recent A1c on 03/18/2024 was 9.3%    HTN  Resume home amlodipine  Hold home lisinopril due to above GILBERTO    DVT prophylaxis:  Lovenox    Admit this patient to observation under my  care.  __________________________________________________________________  LABS/MICRO/MEDS/DIAGNOSTICS     LABS  Recent Labs     04/25/24 1818 04/25/24 2009 04/26/24  0413      < > 144   K 4.1   < > 3.7   CHLORIDE 94*   < > 99   CO2 30   < > 32*   BUN 30.8*   < > 33.8*   CREATININE 1.80*   < > 0.96   GLUCOSE 244*   < > 209*   CALCIUM 10.8*   < > 9.3   ALKPHOS 103  --   --    AST 99*  --   --    ALT 90*  --   --    ALBUMIN 3.4  --   --     < > = values in this interval not displayed.     Recent Labs     04/26/24  0413   WBC 5.76   RBC 5.98   HCT 52.8*   MCV 88.3        MICROBIOLOGY  Microbiology Results (last 7 days)       ** No results found for the last 168 hours. **          MEDICATIONS  Current Facility-Administered Medications   Medication Dose Route Frequency    amLODIPine  10 mg Oral Daily    enoxparin  30 mg Subcutaneous Daily    pantoprazole  40 mg Oral BID    sucralfate  1 g Oral QID      INFUSIONS  Current Facility-Administered Medications   Medication Dose Route Frequency Last Rate Last Admin     DIAGNOSTIC TESTS  CT Abdomen Pelvis  Without Contrast   Final Result      1. Postsurgical changes of prior cholecystectomy with low-density fluid as well as locules of air in the gallbladder fossa.  This focal in etiology, however, developing abscess is not entirely excluded.   2. Diffuse pneumoperitoneum likely secondary to recent surgical intervention.   3. Findings suggestive of ileus.         Electronically signed by: Khanh James MD   Date:    04/25/2024   Time:    20:15      X-Ray Chest 1 View   Final Result      No acute cardiopulmonary abnormality.         Electronically signed by: Yovana Landry   Date:    04/25/2024   Time:    18:46         Patient information was obtained from patient, patient's family, past medical records and ER records.   All diagnosis and differential diagnosis have been reviewed; assessment and plan has been documented. I have personally reviewed the labs and test  results that are presently available; I have reviewed the patients medication list. I have reviewed the consulting providers response and recommendations. I have reviewed or attempted to review medical records based upon their availability.  All of the patient's questions have been addressed and answered. Patient's is agreeable to the above stated plan. I will continue to monitor closely and make adjustments to medical management as needed.  This note was created using [employer] voice recognition software that occasionally misinterpreted phrases or words.  Please contact me if any questions may rise regarding documentation to clarify verbiage.      HENRRY Liriano   Internal Medicine  Department of [facility] Medicine  [facility] - [facility] Surgical Unit

## 2024-04-26 NOTE — PT/OT/SLP EVAL
Physical Therapy Evaluation     Patient Name: Heath Holder   MRN: [id number]  Recent Surgery: * No surgery found *      Recommendations:     Discharge Recommendations: No Therapy Indicated   Discharge Equipment Recommendations: none (pending progress)   Barriers to discharge: None    Assessment:     Heath Holder is a 68 y.o. male admitted with a medical diagnosis of ileus. Patient's PMHx includes but is not limited to: HTN, DM2, chronic LBP with L sided sciatica. Patient recently underwent a laparoscopic cholecystectomy on 4/22/24 with recent surgical changes and findings of ileus. Patient is oriented x4 and able to provide PLOF/history. Patient reports living alone in a [facility]. PLOF includes: Independent with all mobilty and ADLs/IADLs. (+) driving, (-) working. Patient tolerated PT evaluation fairly well, reporting some minor lower abdominal pain and discomfort. He reports feeling weaker than baseline. He presents with the following impairments/functional limitations: weakness, impaired functional mobility, impaired endurance, gait instability, impaired balance, decreased lower extremity function. Patient currently demonstrates a need for Moderate Intensity therapy on a daily basis secondary to a decline in functional status due to surgical procedure.    Rehab Prognosis: Good; patient would benefit from acute PT services to address these deficits and reach maximum level of function.    Plan:     During this hospitalization, patient to be seen  (5-6x/week (QD/BID)) to address the above listed problems via gait training, therapeutic activities, therapeutic exercises    Plan of Care Expires: 05/03/24    Subjective     Chief Complaint: lower abdominal pain/discomfort  Patient Comments/Goals: To get better to return home  Pain/Comfort:  Location - Side 1: Bilateral  Location - Orientation 1: lower  Location 1: abdomen    Social History:  Living Environment: Patient lives alone in a single story home with walk-in  shower  Prior Level of Function: Prior to admission, patient was independent, driving and not working, and ambulated household and community distances using no AD  Equipment Used at Home: none  DME owned (not currently used): none  Assistance Upon Discharge: unknown    Objective:     Communicated with BRYANT and MD prior to session. Both provided clearance for PT/OT evaluation this AM. Patient found HOB elevated with bed alarm, peripheral IV upon PT entry to room.    General Precautions: Standard, fall   Orthopedic Precautions: N/A   Braces: N/A    Respiratory Status: Room air    Exams:  Cognition: Patient is oriented to Person, Place, Time, Situation  RLE ROM: WFL  RLE Strength:  WFL, observed by functional mobility  LLE ROM: WFL  LLE Strength:  WFL, observed by functional mobility    Functional Mobility:  Gait belt applied - Yes  Bed Mobility  Supine to Sit: modified independence for trunk management  Transfers  Sit to Stand: stand by assistance with no AD  Gait  Patient ambulated ~30ft within room with rolling walker and stand by assistance. Patient demonstrates steady gait.  Balance  Sitting: modified independence  Standing: stand by assistance    Therapeutic Activities and Exercises:   Patient educated on role of acute care PT and PT POC, safety while in hospital including calling nurse for mobility, and call light usage    AM-PAC 6 CLICK MOBILITY  Total Score:     Patient left HOB elevated with all lines intact, call button in reach, RN notified, and bed alarm on.    GOALS:   Multidisciplinary Problems       Physical Therapy Goals          Problem: Physical Therapy    Goal Priority Disciplines Outcome Goal Variances Interventions   Physical Therapy Goal     PT, PT/OT Progressing     Description: Goals to be met by: Discharge      Patient will increase functional independence with mobility by performin. Supine to sit with Charles  2. Sit to supine with Charles  3. Sit to stand transfer with  [place]asta  4. Gait  x 500+ feet with [place]asta and Modified [place] using No Assistive Device vs LRAD.                          History:     Past Medical History:   Diagnosis Date    Chronic low back pain with left-sided sciatica     Diabetes mellitus     HTN (hypertension)        Past Surgical History:   Procedure Laterality Date    COLONOSCOPY N/A 4/10/2024    Procedure: COLON;  Surgeon: Ravi Petit MD;  Location: [facility] ENDOSCOPY;  Service: Gastroenterology;  Laterality: N/A;    EGD, WITH CLOSED BIOPSY  4/9/2024    Procedure: EGD, WITH CLOSED BIOPSY;  Surgeon: Ravi Petit MD;  Location: [facility] ENDOSCOPY;  Service: Gastroenterology;;    ESOPHAGOGASTRODUODENOSCOPY N/A 4/9/2024    Procedure: EGD;  Surgeon: Ravi Petit MD;  Location: [facility] ENDOSCOPY;  Service: Gastroenterology;  Laterality: N/A;    HIP SURGERY Left     LAPAROSCOPIC CHOLECYSTECTOMY N/A 4/22/2024    Procedure: CHOLECYSTECTOMY, LAPAROSCOPIC;  Surgeon: Demetrice Gardner MD;  Location: [facility];  Service: General;  Laterality: N/A;    ORIF HIP FRACTURE Right     >10 years       Time Tracking:     PT Received On: 04/26/24  PT Start Time: 1005  PT Stop Time: 1030  PT Total Time (min): 25 min     Billable Minutes: Evaluation MIN Complexity    4/26/2024

## 2024-04-26 NOTE — PLAN OF CARE
Problem: Occupational Therapy  Goal: Occupational Therapy Goal  Description: Goals to be met by: Discharge     Patient will increase functional independence with ADLs by performing:    LE Dressing with Modified [place].  Grooming while standing with Modified [place].  Toileting from toilet with Modified [place] for hygiene and clothing management.   Bathing from shower chair/bench with Modified [place].  Toilet transfer to toilet with Modified [place].    Outcome: Progressing

## 2024-04-26 NOTE — NURSING
Pt left with [facility] via stretcher with IV intact, running clinimix. Called TIM Suarez to update about departure.

## 2024-04-26 NOTE — NURSING
Nurses Note -- 4 Eyes      4/25/2024   11:59 PM      Skin assessed during: Admit      [x] No Altered Skin Integrity Present    []Prevention Measures Documented      [] Yes- Altered Skin Integrity Present or Discovered   [] LDA Added if Not in Epic (Describe Wound)   [] New Altered Skin Integrity was Present on Admit and Documented in LDA   [] Wound Image Taken    Wound Care Consulted? No    Attending Nurse:  Citlaly KNAPP     Second RN/Staff Member:   Monique KNAPP

## 2024-04-27 LAB
ALBUMIN SERPL-MCNC: 2.7 G/DL (ref 3.4–4.8)
ALBUMIN/GLOB SERPL: 0.8 RATIO (ref 1.1–2)
ALP SERPL-CCNC: 62 UNIT/L (ref 40–150)
ALT SERPL-CCNC: 44 UNIT/L (ref 0–55)
AST SERPL-CCNC: 37 UNIT/L (ref 5–34)
BASOPHILS # BLD AUTO: 0.01 X10(3)/MCL
BASOPHILS NFR BLD AUTO: 0.2 %
BILIRUB SERPL-MCNC: 1.3 MG/DL
BUN SERPL-MCNC: 21.5 MG/DL (ref 8.4–25.7)
CALCIUM SERPL-MCNC: 8.8 MG/DL (ref 8.8–10)
CHLORIDE SERPL-SCNC: 100 MMOL/L (ref 98–107)
CO2 SERPL-SCNC: 27 MMOL/L (ref 23–31)
CREAT SERPL-MCNC: 0.73 MG/DL (ref 0.73–1.18)
EOSINOPHIL # BLD AUTO: 0.02 X10(3)/MCL (ref 0–0.9)
EOSINOPHIL NFR BLD AUTO: 0.4 %
ERYTHROCYTE [DISTWIDTH] IN BLOOD BY AUTOMATED COUNT: 11.1 % (ref 11.5–17)
GFR SERPLBLD CREATININE-BSD FMLA CKD-EPI: >60 MLS/MIN/1.73/M2
GLOBULIN SER-MCNC: 3.3 GM/DL (ref 2.4–3.5)
GLUCOSE SERPL-MCNC: 177 MG/DL (ref 82–115)
HCT VFR BLD AUTO: 45 % (ref 42–52)
HGB BLD-MCNC: 15.6 G/DL (ref 14–18)
HOLD SPECIMEN: NORMAL
IMM GRANULOCYTES # BLD AUTO: 0.01 X10(3)/MCL (ref 0–0.04)
IMM GRANULOCYTES NFR BLD AUTO: 0.2 %
LYMPHOCYTES # BLD AUTO: 1.59 X10(3)/MCL (ref 0.6–4.6)
LYMPHOCYTES NFR BLD AUTO: 34.3 %
MAGNESIUM SERPL-MCNC: 1.6 MG/DL (ref 1.6–2.6)
MCH RBC QN AUTO: 30.2 PG (ref 27–31)
MCHC RBC AUTO-ENTMCNC: 34.7 G/DL (ref 33–36)
MCV RBC AUTO: 87 FL (ref 80–94)
MONOCYTES # BLD AUTO: 0.64 X10(3)/MCL (ref 0.1–1.3)
MONOCYTES NFR BLD AUTO: 13.8 %
NEUTROPHILS # BLD AUTO: 2.37 X10(3)/MCL (ref 2.1–9.2)
NEUTROPHILS NFR BLD AUTO: 51.1 %
NRBC BLD AUTO-RTO: 0 %
PHOSPHATE SERPL-MCNC: 2.2 MG/DL (ref 2.3–4.7)
PLATELET # BLD AUTO: 206 X10(3)/MCL (ref 130–400)
PMV BLD AUTO: 10.5 FL (ref 7.4–10.4)
POCT GLUCOSE: 166 MG/DL (ref 70–110)
POCT GLUCOSE: 172 MG/DL (ref 70–110)
POCT GLUCOSE: 201 MG/DL (ref 70–110)
POTASSIUM SERPL-SCNC: 3.5 MMOL/L (ref 3.5–5.1)
PROT SERPL-MCNC: 6 GM/DL (ref 5.8–7.6)
RBC # BLD AUTO: 5.17 X10(6)/MCL (ref 4.7–6.1)
SODIUM SERPL-SCNC: 134 MMOL/L (ref 136–145)
WBC # SPEC AUTO: 4.64 X10(3)/MCL (ref 4.5–11.5)

## 2024-04-27 PROCEDURE — 84100 ASSAY OF PHOSPHORUS: CPT | Performed by: STUDENT IN AN ORGANIZED HEALTH CARE EDUCATION/TRAINING PROGRAM

## 2024-04-27 PROCEDURE — 25000003 PHARM REV CODE 250: Performed by: COLON & RECTAL SURGERY

## 2024-04-27 PROCEDURE — 85025 COMPLETE CBC W/AUTO DIFF WBC: CPT | Performed by: STUDENT IN AN ORGANIZED HEALTH CARE EDUCATION/TRAINING PROGRAM

## 2024-04-27 PROCEDURE — 80053 COMPREHEN METABOLIC PANEL: CPT | Performed by: STUDENT IN AN ORGANIZED HEALTH CARE EDUCATION/TRAINING PROGRAM

## 2024-04-27 PROCEDURE — 63600175 PHARM REV CODE 636 W HCPCS: Performed by: STUDENT IN AN ORGANIZED HEALTH CARE EDUCATION/TRAINING PROGRAM

## 2024-04-27 PROCEDURE — 97161 PT EVAL LOW COMPLEX 20 MIN: CPT

## 2024-04-27 PROCEDURE — 11000001 HC ACUTE MED/SURG PRIVATE ROOM

## 2024-04-27 PROCEDURE — 25000003 PHARM REV CODE 250

## 2024-04-27 PROCEDURE — 99900035 HC TECH TIME PER 15 MIN (STAT)

## 2024-04-27 PROCEDURE — 94760 N-INVAS EAR/PLS OXIMETRY 1: CPT

## 2024-04-27 PROCEDURE — S5010 5% DEXTROSE AND 0.45% SALINE: HCPCS

## 2024-04-27 PROCEDURE — 36415 COLL VENOUS BLD VENIPUNCTURE: CPT | Performed by: COLON & RECTAL SURGERY

## 2024-04-27 PROCEDURE — 36415 COLL VENOUS BLD VENIPUNCTURE: CPT | Performed by: STUDENT IN AN ORGANIZED HEALTH CARE EDUCATION/TRAINING PROGRAM

## 2024-04-27 PROCEDURE — 25000003 PHARM REV CODE 250: Performed by: STUDENT IN AN ORGANIZED HEALTH CARE EDUCATION/TRAINING PROGRAM

## 2024-04-27 PROCEDURE — 83735 ASSAY OF MAGNESIUM: CPT | Performed by: STUDENT IN AN ORGANIZED HEALTH CARE EDUCATION/TRAINING PROGRAM

## 2024-04-27 RX ORDER — LABETALOL HCL 20 MG/4 ML
10 SYRINGE (ML) INTRAVENOUS EVERY 6 HOURS PRN
Status: DISCONTINUED | OUTPATIENT
Start: 2024-04-27 | End: 2024-04-30 | Stop reason: HOSPADM

## 2024-04-27 RX ORDER — DEXTROSE MONOHYDRATE AND SODIUM CHLORIDE 5; .45 G/100ML; G/100ML
INJECTION, SOLUTION INTRAVENOUS CONTINUOUS
Status: DISCONTINUED | OUTPATIENT
Start: 2024-04-27 | End: 2024-04-28

## 2024-04-27 RX ORDER — HYDRALAZINE HYDROCHLORIDE 20 MG/ML
10 INJECTION INTRAMUSCULAR; INTRAVENOUS EVERY 6 HOURS PRN
Status: DISCONTINUED | OUTPATIENT
Start: 2024-04-27 | End: 2024-04-30 | Stop reason: HOSPADM

## 2024-04-27 RX ADMIN — PANTOPRAZOLE SODIUM 40 MG: 40 TABLET, DELAYED RELEASE ORAL at 08:04

## 2024-04-27 RX ADMIN — PIPERACILLIN AND TAZOBACTAM 4.5 G: 4; .5 INJECTION, POWDER, LYOPHILIZED, FOR SOLUTION INTRAVENOUS; PARENTERAL at 05:04

## 2024-04-27 RX ADMIN — SUCRALFATE 1 G: 1 TABLET ORAL at 08:04

## 2024-04-27 RX ADMIN — SUCRALFATE 1 G: 1 TABLET ORAL at 12:04

## 2024-04-27 RX ADMIN — PIPERACILLIN AND TAZOBACTAM 4.5 G: 4; .5 INJECTION, POWDER, LYOPHILIZED, FOR SOLUTION INTRAVENOUS; PARENTERAL at 09:04

## 2024-04-27 RX ADMIN — DEXTROSE AND SODIUM CHLORIDE: 5; 450 INJECTION, SOLUTION INTRAVENOUS at 08:04

## 2024-04-27 RX ADMIN — HYDROCODONE BITARTRATE AND ACETAMINOPHEN 1 TABLET: 5; 325 TABLET ORAL at 09:04

## 2024-04-27 RX ADMIN — PANTOPRAZOLE SODIUM 40 MG: 40 TABLET, DELAYED RELEASE ORAL at 09:04

## 2024-04-27 RX ADMIN — LEUCINE, PHENYLALANINE, LYSINE, METHIONINE, ISOLEUCINE, VALINE, HISTIDINE, THREONINE, TRYPTOPHAN, ALANINE, GLYCINE, ARGININE, PROLINE, SERINE, TYROSINE, DEXTROSE: 311; 238; 247; 170; 255; 247; 204; 179; 77; 880; 438; 489; 289; 213; 17; 5 INJECTION INTRAVENOUS at 06:04

## 2024-04-27 RX ADMIN — INSULIN ASPART 4 UNITS: 100 INJECTION, SOLUTION INTRAVENOUS; SUBCUTANEOUS at 12:04

## 2024-04-27 RX ADMIN — AMLODIPINE BESYLATE 10 MG: 10 TABLET ORAL at 08:04

## 2024-04-27 RX ADMIN — SUCRALFATE 1 G: 1 TABLET ORAL at 09:04

## 2024-04-27 RX ADMIN — PIPERACILLIN AND TAZOBACTAM 4.5 G: 4; .5 INJECTION, POWDER, LYOPHILIZED, FOR SOLUTION INTRAVENOUS; PARENTERAL at 12:04

## 2024-04-27 NOTE — PT/OT/SLP EVAL
Physical Therapy Evaluation & Discharge Note    Patient Name:  Heath Holder   MRN:  [id number]    Recommendations     Therapy Intensity Recommendations at Discharge: No Therapy Indicated  Discharge Equipment Recommendations: none   Equipment to be obtained for discharge: none.  Barriers to discharge: time since onset    Assessment     Heath Holder is a 68 y.o. male admitted with a medical diagnosis of ileus.    He presents with the following impairments/functional limitations:   .    Patient was seen by therapy for evaluation only.  PT Services not warranted at this time.    Recent Surgery: * No surgery found *      Plan     Patient discharged from acute PT Services on 04/27/24.    Based on current functional levels observed, patient does not require further acute PT services.    Re-consult PT Services if patient's status changes and therapy services warranted.    Subjective     Communicated with patient's nurse Tanesha prior to session.    Patient agreeable to participate in evaluation.     Chief Complaint: lower abdominal pain  Patient/Family Comments/goals: go home  Pain/Comfort:  Pain Rating 1: 3/10  Location - Side 1: Bilateral  Location - Orientation 1: lower  Location 1: abdomen  Pain Addressed 1: Nurse notified  Pain Rating Post-Intervention 1: 3/10    Patients cultural, spiritual, Yarsani conflicts given the current situation: no    Social History  Living Environment: Patient lives with their significant other in a camper, with 1 step steps outside, with walk-in shower.  Functional Level: Prior to admission patient was driving, ambulated without assistive device, was independent in ADL's, and was independent in IADL's.  Equipment Used at Home: none  Equipment owned (not currently used): none.  Assistance Upon Discharge: significant other.    Objective     Patient found supine in bed and with HOB elevated with peripheral IV  upon PT entry to room.    General Precautions: Standard, fall   Orthopedic  Precautions:N/A   Braces: N/A  Respiratory Status: room air    Vitals   At Rest (pre-session)  BP  180/92   HR  69   O2 Sat %  98      With Activity (post-session)  BP  187/95   HR  70   O2 Sat %  98     Exams:  Orientation: Patient is oriented to person, place, time, situation  Commands: Patient follows commands consistently  Gross Motor Coordination: WFL  BILAT UE ROM/strength - defer to OT - see OT note for details  RLE ROM: WFL  RLE Strength: WFL  LLE ROM: WFL  LLE Strength: WFL    Functional Mobility:    Bed Mobility:  Rolling Left: modified independence  Rolling Right: modified independence  Supine to Sit: modified independence  Sit to Supine: modified independence  with no cues required    Transfers:  Sit to Stand: modified independence with no assistive device    Gait:  Patient ambulated 130ft with  IV pole  and modified independence.  Patient demonstrates :       steady gait       no loss of balance       decreased tiburcio.    Other Mobility:  not assessed    Balance:  Sit  Static: NORMAL: No deviations seen in posture held statically  Dynamic: NORMAL: No deviations seen in posture held dynamically  Stand  Static: NORMAL: No deviations seen in posture held statically  Dynamic: NORMAL: No deviations seen in posture held dynamically    Additional Treatment Session  n/a    Patient left supine in bed and with HOB elevated with all lines intact, call button in reach, tray table at bedside, and patient' nurse notified.    Education     White board updated regarding patient's safest level of mobility with staff assistance.    Goals - No STG's or LTG's established secondary to patient was seen for evaluation and discharge     Multidisciplinary Problems       Physical Therapy Goals       Not on file                    History     Past Medical History:   Diagnosis Date    Chronic low back pain with left-sided sciatica     Diabetes mellitus     HTN (hypertension)      Past Surgical History:   Procedure Laterality Date     COLONOSCOPY N/A 4/10/2024    Procedure: COLON;  Surgeon: Ravi Petit MD;  Location: [facility] ENDOSCOPY;  Service: Gastroenterology;  Laterality: N/A;    EGD, WITH CLOSED BIOPSY  4/9/2024    Procedure: EGD, WITH CLOSED BIOPSY;  Surgeon: Ravi Petit MD;  Location: [facility] ENDOSCOPY;  Service: Gastroenterology;;    ESOPHAGOGASTRODUODENOSCOPY N/A 4/9/2024    Procedure: EGD;  Surgeon: Ravi Petit MD;  Location: [facility] ENDOSCOPY;  Service: Gastroenterology;  Laterality: N/A;    HIP SURGERY Left     LAPAROSCOPIC CHOLECYSTECTOMY N/A 4/22/2024    Procedure: CHOLECYSTECTOMY, LAPAROSCOPIC;  Surgeon: Demetrice Gardner MD;  Location: [facility];  Service: General;  Laterality: N/A;    ORIF HIP FRACTURE Right     >10 years     Time Tracking     PT Received On: 04/27/24  PT Start Time: 0913     PT Stop Time: 0936  PT Total Time (min): 23 min     Billable Minutes: Evaluation 23 min    04/27/2024

## 2024-04-27 NOTE — PLAN OF CARE
Problem: Adult Inpatient Plan of Care  Goal: Plan of Care Review  Outcome: Progressing  Goal: Patient-Specific Goal (Individualized)  Outcome: Progressing  Goal: Absence of Hospital-Acquired Illness or Injury  Outcome: Progressing  Goal: Optimal Comfort and Wellbeing  Outcome: Progressing  Goal: Readiness for Transition of Care  Outcome: Progressing     Problem: Diabetes Comorbidity  Goal: Blood Glucose Level Within Targeted Range  Outcome: Progressing     Problem: Wound  Goal: Optimal Coping  Outcome: Progressing  Goal: Optimal Functional Ability  Outcome: Progressing  Goal: Absence of Infection Signs and Symptoms  Outcome: Progressing  Goal: Improved Oral Intake  Outcome: Progressing  Goal: Optimal Pain Control and Function  Outcome: Progressing  Goal: Skin Health and Integrity  Outcome: Progressing  Goal: Optimal Wound Healing  Outcome: Progressing     Problem: Infection  Goal: Absence of Infection Signs and Symptoms  Outcome: Progressing     Problem: Fall Injury Risk  Goal: Absence of Fall and Fall-Related Injury  Outcome: Progressing

## 2024-04-27 NOTE — H&P
General Surgery History and Physical     Heath Holder  MRN: [id number]     HPI:  Heath Holder is a 68 y.o. male with a PMH of hypertension and T2DM on metformin who is s/p laparoscopic cholecystectomy for chronic cholecystitis on 4/22 who was admitted as a transfer from OSH after presenting with n/v, abdominal pain and distension. Patient states his pain was improving following surgery however on Thursday he started feeling nauseous and had several episodes of abdominal pain; he also reported feeling dizzy and fatigued. CT A/P obtained showed dilated stomach and small bowel most consistent with ileus.     ROS:  12 pt. ROS performed, ROS as noted in HPI otherwise negative       PMHx:  As per HPI     PSurgHx:  As per HPI    SocialHx:  Not reviewed    Allergies:  NKDA     Physical Exam:  Vitals:    04/27/24 0737   BP: (!) 166/92   Pulse: 69   Resp: 18   Temp: 97.5 °F (36.4 °C)     NAD  RR  Unlabored breathing on RA  Abd soft, moderately distended, TTP around incision sites, incision sites c/d/I w/o erythema or drainage  Moves extremities spontaneously     Labs:  Labs reviewed    Tbili 1.3  WBC 4.6  Hgb 15.6     Radiology:   None new     Assessment and Plan:  Heath Holder is a 68 y.o. male who is s/p laparoscopic cholecystectomy for chronic cholecystitis now with ileus. Pathology consistent with chronic cholecystitis and cholelithiasis.     - admit to general surgery  - patient having bowel movement this morning. AM abdominal XR ordered to evaluate gastric distension. Patient may need NGT  - keep NPO  - lovenox, SCDs for DVT ppx    Galen Ignacio MD  [employer], PGY-3

## 2024-04-27 NOTE — PLAN OF CARE
Problem: Adult Inpatient Plan of Care  Goal: Plan of Care Review  Outcome: Progressing  Goal: Patient-Specific Goal (Individualized)  Outcome: Progressing  Goal: Absence of Hospital-Acquired Illness or Injury  Outcome: Progressing  Goal: Optimal Comfort and Wellbeing  Outcome: Progressing  Goal: Readiness for Transition of Care  Outcome: Progressing     Problem: Diabetes Comorbidity  Goal: Blood Glucose Level Within Targeted Range  Outcome: Progressing     Problem: Wound  Goal: Optimal Coping  Outcome: Progressing  Goal: Optimal Functional Ability  Outcome: Progressing  Goal: Absence of Infection Signs and Symptoms  Outcome: Progressing  Goal: Improved Oral Intake  Outcome: Progressing  Goal: Optimal Pain Control and Function  Outcome: Progressing  Goal: Skin Health and Integrity  Outcome: Progressing  Goal: Optimal Wound Healing  Outcome: Progressing     Problem: Infection  Goal: Absence of Infection Signs and Symptoms  Outcome: Progressing

## 2024-04-28 LAB
OHS QRS DURATION: 98 MS
OHS QTC CALCULATION: 445 MS
POCT GLUCOSE: 160 MG/DL (ref 70–110)
POCT GLUCOSE: 162 MG/DL (ref 70–110)
POCT GLUCOSE: 164 MG/DL (ref 70–110)
POCT GLUCOSE: 210 MG/DL (ref 70–110)

## 2024-04-28 PROCEDURE — 99900035 HC TECH TIME PER 15 MIN (STAT)

## 2024-04-28 PROCEDURE — 63600175 PHARM REV CODE 636 W HCPCS

## 2024-04-28 PROCEDURE — 63600175 PHARM REV CODE 636 W HCPCS: Performed by: STUDENT IN AN ORGANIZED HEALTH CARE EDUCATION/TRAINING PROGRAM

## 2024-04-28 PROCEDURE — 25000003 PHARM REV CODE 250: Performed by: STUDENT IN AN ORGANIZED HEALTH CARE EDUCATION/TRAINING PROGRAM

## 2024-04-28 PROCEDURE — 25000003 PHARM REV CODE 250

## 2024-04-28 PROCEDURE — 94760 N-INVAS EAR/PLS OXIMETRY 1: CPT

## 2024-04-28 PROCEDURE — S5010 5% DEXTROSE AND 0.45% SALINE: HCPCS | Performed by: STUDENT IN AN ORGANIZED HEALTH CARE EDUCATION/TRAINING PROGRAM

## 2024-04-28 PROCEDURE — 11000001 HC ACUTE MED/SURG PRIVATE ROOM

## 2024-04-28 RX ORDER — AMLODIPINE BESYLATE 10 MG/1
10 TABLET ORAL DAILY
Status: DISCONTINUED | OUTPATIENT
Start: 2024-04-28 | End: 2024-04-30 | Stop reason: HOSPADM

## 2024-04-28 RX ORDER — INSULIN ASPART 100 [IU]/ML
0-10 INJECTION, SOLUTION INTRAVENOUS; SUBCUTANEOUS EVERY 6 HOURS PRN
Status: DISCONTINUED | OUTPATIENT
Start: 2024-04-28 | End: 2024-04-28

## 2024-04-28 RX ORDER — GLUCAGON 1 MG
1 KIT INJECTION
Status: DISCONTINUED | OUTPATIENT
Start: 2024-04-28 | End: 2024-04-28

## 2024-04-28 RX ADMIN — HYDRALAZINE HYDROCHLORIDE 10 MG: 20 INJECTION INTRAMUSCULAR; INTRAVENOUS at 12:04

## 2024-04-28 RX ADMIN — INSULIN ASPART 4 UNITS: 100 INJECTION, SOLUTION INTRAVENOUS; SUBCUTANEOUS at 12:04

## 2024-04-28 RX ADMIN — SUCRALFATE 1 G: 1 TABLET ORAL at 04:04

## 2024-04-28 RX ADMIN — AMLODIPINE BESYLATE 10 MG: 10 TABLET ORAL at 09:04

## 2024-04-28 RX ADMIN — SUCRALFATE 1 G: 1 TABLET ORAL at 12:04

## 2024-04-28 RX ADMIN — SUCRALFATE 1 G: 1 TABLET ORAL at 08:04

## 2024-04-28 RX ADMIN — LEUCINE, PHENYLALANINE, LYSINE, METHIONINE, ISOLEUCINE, VALINE, HISTIDINE, THREONINE, TRYPTOPHAN, ALANINE, GLYCINE, ARGININE, PROLINE, SERINE, TYROSINE, DEXTROSE: 311; 238; 247; 170; 255; 247; 204; 179; 77; 880; 438; 489; 289; 213; 17; 5 INJECTION INTRAVENOUS at 08:04

## 2024-04-28 RX ADMIN — PIPERACILLIN AND TAZOBACTAM 4.5 G: 4; .5 INJECTION, POWDER, LYOPHILIZED, FOR SOLUTION INTRAVENOUS; PARENTERAL at 08:04

## 2024-04-28 RX ADMIN — PANTOPRAZOLE SODIUM 40 MG: 40 TABLET, DELAYED RELEASE ORAL at 09:04

## 2024-04-28 RX ADMIN — SUCRALFATE 1 G: 1 TABLET ORAL at 09:04

## 2024-04-28 RX ADMIN — PIPERACILLIN AND TAZOBACTAM 4.5 G: 4; .5 INJECTION, POWDER, LYOPHILIZED, FOR SOLUTION INTRAVENOUS; PARENTERAL at 05:04

## 2024-04-28 RX ADMIN — DEXTROSE AND SODIUM CHLORIDE: 5; 450 INJECTION, SOLUTION INTRAVENOUS at 03:04

## 2024-04-28 RX ADMIN — PIPERACILLIN AND TAZOBACTAM 4.5 G: 4; .5 INJECTION, POWDER, LYOPHILIZED, FOR SOLUTION INTRAVENOUS; PARENTERAL at 12:04

## 2024-04-28 RX ADMIN — PANTOPRAZOLE SODIUM 40 MG: 40 TABLET, DELAYED RELEASE ORAL at 08:04

## 2024-04-28 NOTE — MEDICAL/APP STUDENT
[employer] {[id number]} General Surgery   Progress Note  Admit Date: 4/26/2024  HD#2  POD#* No surgery found *    Subjective:   Interval history:  NAEO  AF, VSS  Having bowel movements x4 while admitted     Objective:     VITAL SIGNS: 24 HR MIN & MAX LAST   Temp  Min: 97.3 °F (36.3 °C)  Max: 98.6 °F (37 °C)  97.3 °F (36.3 °C)   BP  Min: 133/76  Max: 176/89  (!) 157/82    Pulse  Min: 69  Max: 79  79    Resp  Min: 14  Max: 18  14    SpO2  Min: 97 %  Max: 100 %  99 %      Intake/output:    Intake/Output Summary (Last 24 hours) at 4/28/2024 0737  Last data filed at 4/27/2024 1927  Gross per 24 hour   Intake 2008.19 ml   Output --   Net 2008.19 ml       Physical examination:  Gen: NAD, answering questions appropriately  CV: RR  Resp: NWOB on room air   Ext: moving all extremities spontaneously and purposefully    Labs:  No new labs     Imaging:  FINDINGS:  Gas-filled loops of bowel noted throughout the abdomen with no evidence of free air.     Impression: As above.  Further evaluation limited secondary to lack of upright imaging.     Assessment & Plan:   Heath Holder is a 68 y.o. male who is s/p laparoscopic cholecystectomy for chronic cholecystitis now with ileus. Pathology consistent with chronic cholecystitis and cholelithiasis.     Neuro: PRN Zofran and PRN pain meds    CV: scheduled antihypertensive     Pulm: IS Q4h     GI: NPO, PPI, and bowel regimen    Renal: daily labs monitor electrolytes    Heme: daily labs monitor WBC, H&H    ID: Jessica     Msk: none at this time     Abbi Perdomo MS3

## 2024-04-28 NOTE — PROGRESS NOTES
[facility] General Surgery   Progress Note  Admit Date: 4/26/2024  HD#2  POD#* No surgery found *    Subjective:   Interval history:  NAEO  AF, HTNsive to 170s yesterday, improved this AM  Ambulating independently  Passing flatus  Last BM yesterday per nursing   Denies n,v,f,c,sob  Patient refusing insulin and lovenox      Objective:     VITAL SIGNS: 24 HR MIN & MAX LAST   Temp  Min: 97.3 °F (36.3 °C)  Max: 98.6 °F (37 °C)  97.3 °F (36.3 °C)   BP  Min: 133/76  Max: 176/89  (!) 157/82    Pulse  Min: 71  Max: 79  79    Resp  Min: 14  Max: 18  14    SpO2  Min: 97 %  Max: 100 %  99 %      Intake/output:    Intake/Output Summary (Last 24 hours) at 4/28/2024 0828  Last data filed at 4/27/2024 1927  Gross per 24 hour   Intake 2008.19 ml   Output --   Net 2008.19 ml       Physical examination:  Gen: NAD, answering questions appropriately  CV: RR  Abd: soft, compressible, minimally distension, TTP in midline/billie-incisional, incisions are c/d/I covered in dermabond  Resp: NWOB on room air   Ext: moving all extremities spontaneously and purposefully, no BLE edema    Labs:  Labs pending    Imaging:  No new imaging    Pathology:  Final Diagnosis       Gallbladder, cholecystectomy:      - Chronic cholecystitis and cholelithiasis.             Assessment & Plan:   Heath Holder is a 68 y.o. male who is s/p laparoscopic cholecystectomy for chronic cholecystitis now with ileus.     - PRN Zofran and PRN pain meds  - Home amlodipine  - Advance diet to CLD today.   - Continue PPI and bowel regimen  - daily labs monitor electrolytes  - Zosyn   - Continue ambulation, OOB    Abbi Perdomo MS3      Addendum  Patient seen and examined.  Agree with above, edited as needed.    Heath Holder is a 68 y.o. male who is s/p laparoscopic cholecystectomy for chronic cholecystitis now with ileus. Patient reports feeling well this morning. Passing flatus, last BM yesterday per nursing report. Will trial CLD today. Maintain clinimix.    Bebe  MD Tylor  [employer] General Surgery, PGY-1

## 2024-04-29 PROBLEM — E44.0 MODERATE MALNUTRITION: Status: ACTIVE | Noted: 2024-04-29

## 2024-04-29 LAB
ANION GAP SERPL CALC-SCNC: 8 MEQ/L
APTT PPP: 30.7 SECONDS (ref 23.2–33.7)
BUN SERPL-MCNC: 15.7 MG/DL (ref 8.4–25.7)
CALCIUM SERPL-MCNC: 9.2 MG/DL (ref 8.8–10)
CHLORIDE SERPL-SCNC: 99 MMOL/L (ref 98–107)
CO2 SERPL-SCNC: 27 MMOL/L (ref 23–31)
CREAT SERPL-MCNC: 0.75 MG/DL (ref 0.73–1.18)
CREAT/UREA NIT SERPL: 21
ERYTHROCYTE [DISTWIDTH] IN BLOOD BY AUTOMATED COUNT: 10.9 % (ref 11.5–17)
GFR SERPLBLD CREATININE-BSD FMLA CKD-EPI: >60 MLS/MIN/1.73/M2
GLUCOSE SERPL-MCNC: 184 MG/DL (ref 82–115)
HCT VFR BLD AUTO: 48.3 % (ref 42–52)
HGB BLD-MCNC: 17 G/DL (ref 14–18)
HOLD SPECIMEN: NORMAL
HOLD SPECIMEN: NORMAL
INR PPP: 1.1
MAGNESIUM SERPL-MCNC: 1.6 MG/DL (ref 1.6–2.6)
MCH RBC QN AUTO: 30.4 PG (ref 27–31)
MCHC RBC AUTO-ENTMCNC: 35.2 G/DL (ref 33–36)
MCV RBC AUTO: 86.4 FL (ref 80–94)
NRBC BLD AUTO-RTO: 0 %
PHOSPHATE SERPL-MCNC: 2.2 MG/DL (ref 2.3–4.7)
PLATELET # BLD AUTO: 213 X10(3)/MCL (ref 130–400)
PMV BLD AUTO: 10 FL (ref 7.4–10.4)
POCT GLUCOSE: 167 MG/DL (ref 70–110)
POCT GLUCOSE: 171 MG/DL (ref 70–110)
POCT GLUCOSE: 181 MG/DL (ref 70–110)
POCT GLUCOSE: 184 MG/DL (ref 70–110)
POCT GLUCOSE: 205 MG/DL (ref 70–110)
POTASSIUM SERPL-SCNC: 3.3 MMOL/L (ref 3.5–5.1)
PROTHROMBIN TIME: 13.9 SECONDS (ref 11.4–14)
RBC # BLD AUTO: 5.59 X10(6)/MCL (ref 4.7–6.1)
SODIUM SERPL-SCNC: 134 MMOL/L (ref 136–145)
WBC # SPEC AUTO: 5.3 X10(3)/MCL (ref 4.5–11.5)

## 2024-04-29 PROCEDURE — 84100 ASSAY OF PHOSPHORUS: CPT | Performed by: STUDENT IN AN ORGANIZED HEALTH CARE EDUCATION/TRAINING PROGRAM

## 2024-04-29 PROCEDURE — 25000003 PHARM REV CODE 250: Performed by: STUDENT IN AN ORGANIZED HEALTH CARE EDUCATION/TRAINING PROGRAM

## 2024-04-29 PROCEDURE — 94761 N-INVAS EAR/PLS OXIMETRY MLT: CPT

## 2024-04-29 PROCEDURE — 63600175 PHARM REV CODE 636 W HCPCS: Performed by: STUDENT IN AN ORGANIZED HEALTH CARE EDUCATION/TRAINING PROGRAM

## 2024-04-29 PROCEDURE — 83735 ASSAY OF MAGNESIUM: CPT | Performed by: STUDENT IN AN ORGANIZED HEALTH CARE EDUCATION/TRAINING PROGRAM

## 2024-04-29 PROCEDURE — 94760 N-INVAS EAR/PLS OXIMETRY 1: CPT

## 2024-04-29 PROCEDURE — 36415 COLL VENOUS BLD VENIPUNCTURE: CPT | Performed by: STUDENT IN AN ORGANIZED HEALTH CARE EDUCATION/TRAINING PROGRAM

## 2024-04-29 PROCEDURE — 99900035 HC TECH TIME PER 15 MIN (STAT)

## 2024-04-29 PROCEDURE — 85610 PROTHROMBIN TIME: CPT | Performed by: STUDENT IN AN ORGANIZED HEALTH CARE EDUCATION/TRAINING PROGRAM

## 2024-04-29 PROCEDURE — 85027 COMPLETE CBC AUTOMATED: CPT | Performed by: STUDENT IN AN ORGANIZED HEALTH CARE EDUCATION/TRAINING PROGRAM

## 2024-04-29 PROCEDURE — 11000001 HC ACUTE MED/SURG PRIVATE ROOM

## 2024-04-29 PROCEDURE — 25000003 PHARM REV CODE 250

## 2024-04-29 PROCEDURE — 85730 THROMBOPLASTIN TIME PARTIAL: CPT | Performed by: STUDENT IN AN ORGANIZED HEALTH CARE EDUCATION/TRAINING PROGRAM

## 2024-04-29 PROCEDURE — 80048 BASIC METABOLIC PNL TOTAL CA: CPT | Performed by: STUDENT IN AN ORGANIZED HEALTH CARE EDUCATION/TRAINING PROGRAM

## 2024-04-29 RX ORDER — MAGNESIUM SULFATE HEPTAHYDRATE 40 MG/ML
2 INJECTION, SOLUTION INTRAVENOUS ONCE
Status: COMPLETED | OUTPATIENT
Start: 2024-04-29 | End: 2024-04-29

## 2024-04-29 RX ORDER — LISINOPRIL 10 MG/1
40 TABLET ORAL DAILY
Status: DISCONTINUED | OUTPATIENT
Start: 2024-04-29 | End: 2024-04-30 | Stop reason: HOSPADM

## 2024-04-29 RX ORDER — ENOXAPARIN SODIUM 100 MG/ML
40 INJECTION SUBCUTANEOUS EVERY 24 HOURS
Status: DISCONTINUED | OUTPATIENT
Start: 2024-04-29 | End: 2024-04-30 | Stop reason: HOSPADM

## 2024-04-29 RX ADMIN — POTASSIUM PHOSPHATE, MONOBASIC AND POTASSIUM PHOSPHATE, DIBASIC 15 MMOL: 224; 236 INJECTION, SOLUTION, CONCENTRATE INTRAVENOUS at 12:04

## 2024-04-29 RX ADMIN — PIPERACILLIN AND TAZOBACTAM 4.5 G: 4; .5 INJECTION, POWDER, LYOPHILIZED, FOR SOLUTION INTRAVENOUS; PARENTERAL at 12:04

## 2024-04-29 RX ADMIN — LEUCINE, PHENYLALANINE, LYSINE, METHIONINE, ISOLEUCINE, VALINE, HISTIDINE, THREONINE, TRYPTOPHAN, ALANINE, GLYCINE, ARGININE, PROLINE, SERINE, TYROSINE, DEXTROSE: 311; 238; 247; 170; 255; 247; 204; 179; 77; 880; 438; 489; 289; 213; 17; 5 INJECTION INTRAVENOUS at 08:04

## 2024-04-29 RX ADMIN — MAGNESIUM SULFATE HEPTAHYDRATE 2 G: 40 INJECTION, SOLUTION INTRAVENOUS at 10:04

## 2024-04-29 RX ADMIN — LISINOPRIL 40 MG: 10 TABLET ORAL at 12:04

## 2024-04-29 RX ADMIN — SUCRALFATE 1 G: 1 TABLET ORAL at 08:04

## 2024-04-29 RX ADMIN — SUCRALFATE 1 G: 1 TABLET ORAL at 06:04

## 2024-04-29 RX ADMIN — PIPERACILLIN AND TAZOBACTAM 4.5 G: 4; .5 INJECTION, POWDER, LYOPHILIZED, FOR SOLUTION INTRAVENOUS; PARENTERAL at 08:04

## 2024-04-29 RX ADMIN — SUCRALFATE 1 G: 1 TABLET ORAL at 12:04

## 2024-04-29 RX ADMIN — PIPERACILLIN AND TAZOBACTAM 4.5 G: 4; .5 INJECTION, POWDER, LYOPHILIZED, FOR SOLUTION INTRAVENOUS; PARENTERAL at 04:04

## 2024-04-29 RX ADMIN — AMLODIPINE BESYLATE 10 MG: 10 TABLET ORAL at 08:04

## 2024-04-29 RX ADMIN — PANTOPRAZOLE SODIUM 40 MG: 40 TABLET, DELAYED RELEASE ORAL at 08:04

## 2024-04-29 NOTE — PT/OT/SLP DISCHARGE
Physical Therapy Discharge Summary    Name: Heath Holder  MRN: [id number]   Principal Problem: Ileus     Patient Discharged from acute Physical Therapy on 24.  Please refer to prior PT noted date on 24 for functional status.     Assessment:     Patient was discharged unexpectedly.  Information required to complete an accurate discharge summary is unknown.  Refer to therapy initial evaluation and last progress note for initial and most recent functional status and goal achievement.  Recommendations made may be found in medical record.    Objective:     GOALS:   Multidisciplinary Problems       Physical Therapy Goals          Problem: Physical Therapy    Goal Priority Disciplines Outcome Goal Variances Interventions   Physical Therapy Goal     PT, PT/OT Unable to Meet     Description: Goals to be met by: Discharge      Patient will increase functional independence with mobility by performin. Supine to sit with [place]  2. Sit to supine with [place]  3. Sit to stand transfer with [place]  4. Gait  x 500+ feet with [place] and Modified [place] using No Assistive Device vs LRAD.                          Reasons for Discontinuation of Therapy Services  Transfer to alternate level of care.      Plan:     Patient Discharged to:  Transfer out for further evaluation and possible drainage of suspected abscess. .      2024

## 2024-04-29 NOTE — PLAN OF CARE
Problem: Adult Inpatient Plan of Care  Goal: Plan of Care Review  Outcome: Progressing  Goal: Patient-Specific Goal (Individualized)  Outcome: Progressing  Goal: Absence of Hospital-Acquired Illness or Injury  Outcome: Progressing     Problem: Diabetes Comorbidity  Goal: Blood Glucose Level Within Targeted Range  Outcome: Progressing     Problem: Wound  Goal: Optimal Coping  Outcome: Progressing  Goal: Improved Oral Intake  Outcome: Progressing  Goal: Optimal Pain Control and Function  Outcome: Progressing     Problem: Infection  Goal: Absence of Infection Signs and Symptoms  Outcome: Progressing

## 2024-04-29 NOTE — PROGRESS NOTES
Inpatient Nutrition Assessment    Admit Date: 4/26/2024   Total duration of encounter: 3 days   Patient Age: 68 y.o.    Nutrition Recommendation/Prescription     Continue to advance diet as tolerated -- goal diet Low Residue/GI Soft, Cardiac/Diabetic diet  Boost Plus vanilla TID once diet advanced (provides 360 kcal, 14 g protein per serving)   Continue Clinimix 4.25/5 @ 75mL/hr until po intake >60%; provides 612 kcals, 77g pro, 1800mL fluid  Consider daily MVI  Replenish electrolytes as needed  Monitor diet adv, wt, labs    Communication of Recommendations: reviewed with nurse and reviewed with patient    Nutrition Assessment     Malnutrition Assessment/Nutrition-Focused Physical Exam    Malnutrition Context: acute illness or injury (04/29/24 1448)  Malnutrition Level: moderate (04/29/24 1448)  Energy Intake (Malnutrition): less than or equal to 50% for greater than or equal to 5 days (04/29/24 1448)                 Muscle Mass (Malnutrition): mild depletion (04/29/24 1448)  [place] (Muscle Loss): mild depletion                                A minimum of two characteristics is recommended for diagnosis of either severe or non-severe malnutrition.    Chart Review    Reason Seen: continuous nutrition monitoring    Malnutrition Screening Tool Results   Have you recently lost weight without trying?: No  Have you been eating poorly because of a decreased appetite?: No   MST Score: 0     Diagnosis: s/p laparoscopic cholecystectomy for chronic cholecystitis now with improving ileus and gallbladder abscess    Relevant Medical History: hypertension and T2DM on metformin    Scheduled Medications:  amLODIPine, 10 mg, Daily  enoxparin, 40 mg, Q24H (prophylaxis, 1700)  lisinopriL, 40 mg, Daily  pantoprazole, 40 mg, BID  piperacillin-tazobactam (Zosyn) IV (PEDS and ADULTS) (extended infusion is not appropriate), 4.5 g, Q8H  sucralfate, 1 g, QID    Continuous Infusions:  amino acid 4.25% - dextrose 5% solution, Last Rate:  75 mL/hr at 04/28/24 2001    PRN Medications:   Current Facility-Administered Medications:     acetaminophen, 650 mg, Oral, Q4H PRN    albuterol-ipratropium, 3 mL, Nebulization, Q6H PRN    aluminum-magnesium hydroxide-simethicone, 30 mL, Oral, QID PRN    bisacodyL, 10 mg, Rectal, Daily PRN    dextrose 10%, 12.5 g, Intravenous, PRN    dextrose 10%, 12.5 g, Intravenous, PRN    dextrose 10%, 25 g, Intravenous, PRN    glucagon (human recombinant), 1 mg, Intramuscular, PRN    glucose, 16 g, Oral, PRN    glucose, 24 g, Oral, PRN    hydrALAZINE, 10 mg, Intravenous, Q6H PRN    HYDROcodone-acetaminophen, 1 tablet, Oral, Q6H PRN    insulin aspart U-100, 0-10 Units, Subcutaneous, QID (AC + HS) PRN    labetalol, 10 mg, Intravenous, Q6H PRN    melatonin, 9 mg, Oral, Nightly PRN    morphine, 2 mg, Intravenous, Q6H PRN    naloxone, 0.02 mg, Intravenous, PRN    ondansetron, 8 mg, Oral, Q8H PRN    ondansetron, 4 mg, Intravenous, Q8H PRN    polyethylene glycol, 17 g, Oral, TID PRN    simethicone, 1 tablet, Oral, QID PRN    sodium chloride 0.9%, 10 mL, Intravenous, PRN    Calorie Containing IV Medications: no significant kcals from medications at this time and Clinimix    Recent Labs   Lab 04/25/24  1818 04/25/24 2009 04/26/24  0413 04/27/24  0706 04/27/24  0707 04/29/24  0817    137 144  --  134* 134*   K 4.1 4.6 3.7  --  3.5 3.3*   CALCIUM 10.8* 10.4* 9.3  --  8.8 9.2   PHOS  --   --   --   --  2.2* 2.2*   MG  --   --   --   --  1.60 1.60   CHLORIDE 94* 94* 99  --  100 99   CO2 30 28 32*  --  27 27   BUN 30.8* 29.6* 33.8*  --  21.5 15.7   CREATININE 1.80* 1.76* 0.96  --  0.73 0.75   EGFRNORACEVR 41 42 >60  --  >60 >60   GLUCOSE 244* 212* 209*  --  177* 184*   BILITOT 1.2  --  0.8  --  1.3  --    ALKPHOS 103  --  81  --  62  --    ALT 90*  --  63*  --  44  --    AST 99*  --  42*  --  37*  --    ALBUMIN 3.4  --  3.0*  --  2.7*  --    WBC 7.80  --  5.76 4.64  --  5.30   HGB 19.1*  --  17.8 15.6  --  17.0   HCT 58.3*  --  52.8*  "45.0  --  48.3     Nutrition Orders:  amino acid 4.25% - dextrose 5% solution  Diet Clear Liquid      Appetite/Oral Intake: NPO/NPO -- no attempt at po intake during rounds  Factors Affecting Nutritional Intake: clear liquid diet  Social Needs Impacting Access to Food: none identified  Food/Islam/Cultural Preferences: none reported  Food Allergies: no known food allergies  Last Bowel Movement: 24  Wound(s):  abd incision    Comments  : Pt diet updated to clear liquids during rounds; no attempt at po intake. Pt reports feeling hungry; Clinimix running at 75mL/hr. Pt denies any GI complaints; awaiting BM. Pt reports decreased appetite ~5 days; reports wt loss but unable to provide timeframe. Pt states UBW ~145#; no significant wt loss noted per EMR wt hx. Pt underwt for age; muscle loss noted. K/Phos (L) -- replete prn.      Anthropometrics    Height: 5' 7" (170.2 cm),    Last Weight: 59.3 kg (130 lb 11.7 oz) (24 07), Weight Method: Bed Scale  BMI (Calculated): 20.5  BMI Classification: underweight (BMI less than 22 if >65 years of age)        Ideal Body Weight (IBW), Male: 148 lb     % Ideal Body Weight, Male (lb): 88.33 %                 Usual Body Weight (UBW), k.9 kg (#)  % Usual Body Weight: 90.17     Usual Weight Provided By: patient and EMR weight history    Wt Readings from Last 5 Encounters:   24 59.3 kg (130 lb 11.7 oz)   24 59.3 kg (130 lb 11.7 oz)   24 57.6 kg (127 lb)   24 58.5 kg (129 lb)   24 52.2 kg (115 lb)     Weight Change(s) Since Admission:   Wt Readings from Last 1 Encounters:   24 59.3 kg (130 lb 11.7 oz)   Admit Weight: 59.3 kg (130 lb 11.7 oz) (24), Weight Method: Bed Scale    Estimated Needs    Weight Used For Calorie Calculations: 59.3 kg (130 lb 11.7 oz)  Energy Calorie Requirements (kcal): 9410-5307 kcal (30-32 kcal/kg)     Weight Used For Protein Calculations: 59.3 kg (130 lb 11.7 oz)  Protein " Requirements: 71g (1.2 g/kg)  Fluid Requirements (mL): 3410-5789 mL or per MD (1 mL/kcal)        Enteral Nutrition     Patient not receiving enteral nutrition at this time.    Parenteral Nutrition     Standard Formula: Clinimix 4.25/5  Custom Formula: not applicable  Additives: none  Rate/Volume: 75 mL/hr  Lipids: none  Total Nutrition Provided by Parenteral Nutrition:  Calories Provided  612 kcal/d, 34% needs   Protein Provided  77 g/d, 108% needs   Dextrose Provided  90 g/d, GIR 1.05 mg CHO/kg/min   Fluid Provided  1800 ml/d, 100% needs       Evaluation of Received Nutrient Intake    Calories: not meeting estimated needs  Protein: meeting estimated needs    Patient Education     Not applicable.    Nutrition Diagnosis     PES: Inadequate energy intake related to inability to consume sufficient nutrients as evidenced by CLD; Clinimix @ 75mL/hr not meeting kcal needs. (new)     PES: Moderate acute disease or injury related malnutrition related to acute illness as evidenced by less than or equal to 50% needs met for greater than or equal to 5 days and mild muscle depletion. (new)    Nutrition Interventions     Intervention(s): modified composition of meals/snacks, modified composition of parenteral nutrition, modified rate of parenteral nutrition, commercial beverage, multivitamin/mineral supplement therapy, and collaboration with other providers    Goal: Meet greater than 80% of nutritional needs by follow-up. (new)  Goal: Maintain weight throughout hospitalization. (new)    Nutrition Goals & Monitoring     Dietitian will monitor: energy intake, weight, electrolyte/renal panel, glucose/endocrine profile, and gastrointestinal profile  Discharge planning: too early to determine; pending clinical course  Nutrition Risk/Follow-Up: high (follow-up in 1-4 days)   Please consult if re-assessment needed sooner.

## 2024-04-29 NOTE — PLAN OF CARE
Problem: Physical Therapy  Goal: Physical Therapy Goal  Description: Goals to be met by: Discharge      Patient will increase functional independence with mobility by performin. Supine to sit with [place]  2. Sit to supine with [place]  3. Sit to stand transfer with [place]  4. Gait  x 500+ feet with [place] and Modified [place] using No Assistive Device vs LRAD.     Outcome: Unable to Meet

## 2024-04-29 NOTE — PROGRESS NOTES
[facility] Surgery   Progress Note  Admit Date: 4/26/2024  HD#3  POD#* No surgery found *     Subjective:   Interval history:  NAEON  AF, VSS  Ambulating independently  No BM yesterday, passing flatus  Denies fever, chills, nausea, vomiting, chest pain, SOB     Objective:      VITAL SIGNS: 24 HR MIN & MAX LAST   Temp  Min: 97.3 °F (36.3 °C)  Max: 98.4 °F (36.9 °C)  98.4 °F (36.9 °C)   BP  Min: 153/93  Max: 162/94  (!) 155/82    Pulse  Min: 76  Max: 81  78    Resp  Min: 14  Max: 18  18    SpO2  Min: 99 %  Max: 100 %  99 %       Intake/output:     Intake/Output Summary (Last 24 hours) at 4/29/2024 0559  Last data filed at 4/29/2024 0554      Gross per 24 hour   Intake 1513.15 ml   Output --   Net 1513.15 ml         Physical examination:  Gen: NAD, answering questions appropriately  CV: RR  Resp: NWOB  Abd: Soft, mildly distended, incisional tenderness to palpation, incisions are clean, dry, and intact  Ext: moving all extremities spontaneously and purposefully        Labs:  No new labs     Imaging:  KUB Gas-filled loops of bowel again noted throughout the abdomen.  Postsurgical changes of the left hip unchanged.      Assessment & Plan:   Heath Holder is a 68 y.o. male who is s/p laparoscopic cholecystectomy for chronic cholecystitis now with improving ileus and gallbladder abscess     - IR consult for drainage/aspiration of gallbladder abscess   - PRN Zofran and PRN pain meds  - Home amlodipine  - Continue NPO on clinimix  - Continue PPI and bowel regimen  - daily labs monitor electrolytes  - Zosyn   - Continue ambulation, OOB     Rachelle Vogel  [employer] [facility] [employer]    PGY-1 ADDENDUM  I have seen and examined the patient with the student. Above note has been reviewed and edited.     Alberto Croft MD MPH  [facility] General Surgery PGY1

## 2024-04-29 NOTE — ASSESSMENT & PLAN NOTE
Patient meets ASPEN criteria for moderate malnutrition of acute illness or injury per RD assessment as evidenced by:  Energy Intake (Malnutrition): less than or equal to 50% for greater than or equal to 5 days        Muscle Mass (Malnutrition): mild depletion           A minimum of two characteristics is recommended for diagnosis of either severe or non-severe malnutrition.

## 2024-04-30 VITALS
TEMPERATURE: 98 F | HEART RATE: 93 BPM | RESPIRATION RATE: 18 BRPM | OXYGEN SATURATION: 100 % | WEIGHT: 130.75 LBS | DIASTOLIC BLOOD PRESSURE: 71 MMHG | SYSTOLIC BLOOD PRESSURE: 127 MMHG | HEIGHT: 67 IN | BODY MASS INDEX: 20.52 KG/M2

## 2024-04-30 PROBLEM — K65.1 INTRA-ABDOMINAL ABSCESS: Status: ACTIVE | Noted: 2024-04-30

## 2024-04-30 LAB
ANION GAP SERPL CALC-SCNC: 12 MEQ/L
BUN SERPL-MCNC: 16.2 MG/DL (ref 8.4–25.7)
CALCIUM SERPL-MCNC: 8.5 MG/DL (ref 8.8–10)
CHLORIDE SERPL-SCNC: 97 MMOL/L (ref 98–107)
CO2 SERPL-SCNC: 24 MMOL/L (ref 23–31)
CREAT SERPL-MCNC: 0.8 MG/DL (ref 0.73–1.18)
CREAT/UREA NIT SERPL: 20
ERYTHROCYTE [DISTWIDTH] IN BLOOD BY AUTOMATED COUNT: 10.8 % (ref 11.5–17)
GFR SERPLBLD CREATININE-BSD FMLA CKD-EPI: >60 MLS/MIN/1.73/M2
GLUCOSE SERPL-MCNC: 296 MG/DL (ref 82–115)
HCT VFR BLD AUTO: 48.1 % (ref 42–52)
HGB BLD-MCNC: 16.7 G/DL (ref 14–18)
HOLD SPECIMEN: NORMAL
MAGNESIUM SERPL-MCNC: 1.6 MG/DL (ref 1.6–2.6)
MCH RBC QN AUTO: 30.3 PG (ref 27–31)
MCHC RBC AUTO-ENTMCNC: 34.7 G/DL (ref 33–36)
MCV RBC AUTO: 87.1 FL (ref 80–94)
NRBC BLD AUTO-RTO: 0 %
PHOSPHATE SERPL-MCNC: 1.8 MG/DL (ref 2.3–4.7)
PLATELET # BLD AUTO: 222 X10(3)/MCL (ref 130–400)
PMV BLD AUTO: 10.1 FL (ref 7.4–10.4)
POCT GLUCOSE: 172 MG/DL (ref 70–110)
POCT GLUCOSE: 186 MG/DL (ref 70–110)
POTASSIUM SERPL-SCNC: 3.3 MMOL/L (ref 3.5–5.1)
RBC # BLD AUTO: 5.52 X10(6)/MCL (ref 4.7–6.1)
SODIUM SERPL-SCNC: 133 MMOL/L (ref 136–145)
WBC # SPEC AUTO: 5.36 X10(3)/MCL (ref 4.5–11.5)

## 2024-04-30 PROCEDURE — 84100 ASSAY OF PHOSPHORUS: CPT | Performed by: STUDENT IN AN ORGANIZED HEALTH CARE EDUCATION/TRAINING PROGRAM

## 2024-04-30 PROCEDURE — 80048 BASIC METABOLIC PNL TOTAL CA: CPT | Performed by: STUDENT IN AN ORGANIZED HEALTH CARE EDUCATION/TRAINING PROGRAM

## 2024-04-30 PROCEDURE — 83735 ASSAY OF MAGNESIUM: CPT | Performed by: STUDENT IN AN ORGANIZED HEALTH CARE EDUCATION/TRAINING PROGRAM

## 2024-04-30 PROCEDURE — 25000003 PHARM REV CODE 250: Performed by: STUDENT IN AN ORGANIZED HEALTH CARE EDUCATION/TRAINING PROGRAM

## 2024-04-30 PROCEDURE — 94761 N-INVAS EAR/PLS OXIMETRY MLT: CPT

## 2024-04-30 PROCEDURE — 63600175 PHARM REV CODE 636 W HCPCS: Performed by: STUDENT IN AN ORGANIZED HEALTH CARE EDUCATION/TRAINING PROGRAM

## 2024-04-30 PROCEDURE — 99900035 HC TECH TIME PER 15 MIN (STAT)

## 2024-04-30 PROCEDURE — 36415 COLL VENOUS BLD VENIPUNCTURE: CPT | Performed by: STUDENT IN AN ORGANIZED HEALTH CARE EDUCATION/TRAINING PROGRAM

## 2024-04-30 PROCEDURE — 85027 COMPLETE CBC AUTOMATED: CPT | Performed by: STUDENT IN AN ORGANIZED HEALTH CARE EDUCATION/TRAINING PROGRAM

## 2024-04-30 RX ORDER — POLYETHYLENE GLYCOL 3350 17 G/17G
17 POWDER, FOR SOLUTION ORAL DAILY
Status: DISCONTINUED | OUTPATIENT
Start: 2024-04-30 | End: 2024-04-30 | Stop reason: HOSPADM

## 2024-04-30 RX ORDER — AMOXICILLIN 250 MG
1 CAPSULE ORAL 2 TIMES DAILY
Status: DISCONTINUED | OUTPATIENT
Start: 2024-04-30 | End: 2024-04-30 | Stop reason: HOSPADM

## 2024-04-30 RX ORDER — MAGNESIUM SULFATE HEPTAHYDRATE 40 MG/ML
2 INJECTION, SOLUTION INTRAVENOUS ONCE
Status: COMPLETED | OUTPATIENT
Start: 2024-04-30 | End: 2024-04-30

## 2024-04-30 RX ORDER — POLYETHYLENE GLYCOL 3350 17 G/17G
17 POWDER, FOR SOLUTION ORAL DAILY
Qty: 24 PACKET | Refills: 0 | Status: SHIPPED | OUTPATIENT
Start: 2024-04-30

## 2024-04-30 RX ORDER — BISACODYL 10 MG/1
10 SUPPOSITORY RECTAL DAILY
Status: DISCONTINUED | OUTPATIENT
Start: 2024-04-30 | End: 2024-04-30 | Stop reason: HOSPADM

## 2024-04-30 RX ORDER — AMOXICILLIN AND CLAVULANATE POTASSIUM 875; 125 MG/1; MG/1
1 TABLET, FILM COATED ORAL 2 TIMES DAILY
Qty: 6 TABLET | Refills: 0 | Status: SHIPPED | OUTPATIENT
Start: 2024-04-30 | End: 2024-05-03

## 2024-04-30 RX ADMIN — MAGNESIUM SULFATE HEPTAHYDRATE 2 G: 40 INJECTION, SOLUTION INTRAVENOUS at 12:04

## 2024-04-30 RX ADMIN — BISACODYL 10 MG: 10 SUPPOSITORY RECTAL at 10:04

## 2024-04-30 RX ADMIN — LISINOPRIL 40 MG: 10 TABLET ORAL at 08:04

## 2024-04-30 RX ADMIN — THERA TABS 1 TABLET: TAB at 08:04

## 2024-04-30 RX ADMIN — SUCRALFATE 1 G: 1 TABLET ORAL at 08:04

## 2024-04-30 RX ADMIN — SENNOSIDES AND DOCUSATE SODIUM 1 TABLET: 50; 8.6 TABLET ORAL at 08:04

## 2024-04-30 RX ADMIN — AMLODIPINE BESYLATE 10 MG: 10 TABLET ORAL at 08:04

## 2024-04-30 RX ADMIN — PIPERACILLIN AND TAZOBACTAM 4.5 G: 4; .5 INJECTION, POWDER, LYOPHILIZED, FOR SOLUTION INTRAVENOUS; PARENTERAL at 03:04

## 2024-04-30 RX ADMIN — PANTOPRAZOLE SODIUM 40 MG: 40 TABLET, DELAYED RELEASE ORAL at 08:04

## 2024-04-30 RX ADMIN — PIPERACILLIN AND TAZOBACTAM 4.5 G: 4; .5 INJECTION, POWDER, LYOPHILIZED, FOR SOLUTION INTRAVENOUS; PARENTERAL at 05:04

## 2024-04-30 RX ADMIN — POLYETHYLENE GLYCOL 3350 17 G: 17 POWDER, FOR SOLUTION ORAL at 08:04

## 2024-04-30 RX ADMIN — SUCRALFATE 1 G: 1 TABLET ORAL at 12:04

## 2024-04-30 NOTE — PLAN OF CARE
Problem: Adult Inpatient Plan of Care  Goal: Plan of Care Review  Outcome: Progressing  Goal: Patient-Specific Goal (Individualized)  Outcome: Progressing  Goal: Readiness for Transition of Care  Outcome: Progressing     Problem: Diabetes Comorbidity  Goal: Blood Glucose Level Within Targeted Range  Outcome: Progressing     Problem: Wound  Goal: Optimal Coping  Outcome: Progressing  Goal: Optimal Functional Ability  Outcome: Progressing

## 2024-04-30 NOTE — DISCHARGE SUMMARY
[facility] [address] Med Surg Telemetry  Discharge Note  Short Stay    * No surgery found *    Hospital Course: Heath Holder is a 68 y.o. male who is s/p laparoscopic cholecystectomy for chronic cholecystitis on 4/22/24 who was readmitted for ileus and gallbladder fossa abscess. Patient was started on Zosyn and intraabdominal abscess was not amendable to IR drainage. Patient remained afebrile and began to tolerating regular diet without surgical intervention.  Today patient had bowel movements x2 and has been able to ambulate independently. Plan is for him to be discharged home today with PO Augmentin for 3 days and miralax with follow up in 2 weeks.        OUTCOME: Patient tolerated treatment/procedure well without complication and is now ready for discharge.    DISPOSITION: Home or Self Care    FINAL DIAGNOSIS:  Ileus    FOLLOWUP: In clinic    DISCHARGE INSTRUCTIONS:    Discharge Procedure Orders   Diet Adult Regular     No dressing needed     Activity as tolerated      PO Augmentin for 3 days   Miralax PRN for constipation     TIME SPENT ON DISCHARGE: 33 minutes

## 2024-04-30 NOTE — PROGRESS NOTES
[facility] Surgery   Progress Note  Admit Date: 4/26/2024  HD#4  POD#* No surgery found *     Subjective:   Interval history:  NAEON  AF, VSS  Tolerating clear liquid diet   Ambulating independently  No BM yesterday, passing flatus  Denies fever, chills, nausea, vomiting, CP, or SOB     Objective:      VITAL SIGNS: 24 HR MIN & MAX LAST   Temp  Min: 97.6 °F (36.4 °C)  Max: 99 °F (37.2 °C)  99 °F (37.2 °C)   BP  Min: 132/72  Max: 158/90  132/72    Pulse  Min: 75  Max: 81  75    Resp  Min: 18  Max: 18  18    SpO2  Min: 96 %  Max: 100 %  96 %       Intake/output:     Intake/Output Summary (Last 24 hours) at 4/30/2024 0556  Last data filed at 4/29/2024 1740      Gross per 24 hour   Intake 90.87 ml   Output --   Net 90.87 ml         Physical examination:  Gen: NAD, answering questions appropriately  CV: RR  Resp: NWOB  Abd: Soft, mildly distended, non tender, incisions are clean, dry, and intact  Ext: moving all extremities spontaneously and purposefully     Labs:  Labs pending     Imaging:  No new imaging     Assessment & Plan:   Heath Holder is a 68 y.o. male who is s/p laparoscopic cholecystectomy for chronic cholecystitis on 4/22/24 now with improving ileus and minimal gallbladder fossa collection, not amendable to IR drainage. Afebrile, tolerating clear liquids and passing flatus. Awaiting bowel movement.      - PRN Zofran and PRN pain meds  - Home amlodipine  - Advance to regular diet  - Increase Bowel regimen, senna BID, Miralax and suppository   - Continue PPI   - daily labs monitor electrolytes  - Zosyn, day 4/7  - Continue ambulation, OOB     Rachelle Vogel  [facility] [employer]     PGY-1 ADDENDUM  I have seen and examined the patient with the student. Above note has been reviewed and edited.     Alberto Croft MD MPH  [facility] General Surgery PGY1

## 2024-04-30 NOTE — PLAN OF CARE
Problem: Adult Inpatient Plan of Care  Goal: Plan of Care Review  4/30/2024 1729 by Ivett Jones RN  Outcome: Met  4/30/2024 1321 by Ivett Jones RN  Outcome: Progressing  Goal: Patient-Specific Goal (Individualized)  4/30/2024 1729 by Ivett Jones RN  Outcome: Met  4/30/2024 1321 by Ivett Jones RN  Outcome: Progressing  Goal: Absence of Hospital-Acquired Illness or Injury  4/30/2024 1729 by Ivett Jones RN  Outcome: Met  4/30/2024 1321 by Ivett Jones RN  Outcome: Progressing  Goal: Optimal Comfort and Wellbeing  4/30/2024 1729 by Ivett Jones RN  Outcome: Met  4/30/2024 1321 by Ivett Jones RN  Outcome: Progressing  Goal: Readiness for Transition of Care  4/30/2024 1729 by Ivett Jones RN  Outcome: Met  4/30/2024 1321 by Ivett Jones RN  Outcome: Progressing      Rogelio Denis

## 2024-05-05 NOTE — PHYSICIAN QUERY
Please clarify the nutritional diagnosis associated with the clinical findings (include all that apply):  Moderate Protein Calorie Malnutrition

## 2024-05-05 NOTE — PHYSICIAN QUERY
Please clarify Gallbladder Fossa Abscess as it relates to the procedure Laparoscopic Cholecystectomy on 4/22/24.  Complication of the procedure

## 2024-05-05 NOTE — PHYSICIAN QUERY
Please clarify Ileus as it relates to the procedure: Laparoscopic Cholecystectomy  Complication of the procedure

## 2024-05-15 NOTE — PHYSICIAN QUERY
Please clarify the nutritional diagnosis associated with below clinical findings. (Include all that apply).      Severe Protein Calorie Malnutrition

## 2024-05-20 PROBLEM — Z00.00 WELL ADULT EXAM: Status: RESOLVED | Noted: 2024-02-15 | Resolved: 2024-05-20

## 2024-05-20 NOTE — DISCHARGE SUMMARY
[facility] Hospital Medicine Discharge Summary    Admit Date: 4/8/2024  Discharge Date and Time: 04/12/2024  Admitting Physician:  Team  Discharging Physician: Bo Waldron MD.  Primary Care Physician: Marilu Amezquita FNP  Consults: Hospital Medicine    Discharge Diagnoses:  Abdominal pain 2/2 Acute Cholecystitis vs possible Hepatitis  Uncontrolled HTN  DM type 2   Chronic back pain    Hospital Course:   68 year old male with a pmh of HTN, DM2, and chronic back pain who presented to the ED with c/o RUQ abdominal pain x 1 month, getting progressively worse.  He also states that he has been having diarrhea, especially after eating.  Denies fever, chills, chest pain, vomiting.  He states that he was seen at another ER and told that he had gallstones and to follow up with surgery.     ED vitals on arrival:  Temp 98.3° F, pulse 92, resp 18, /98, SpO2 97% on RA.  Today's ED lab work revealed H&H 19.7/56.7, glucose 224, , ALT 89.  CT abdomen pelvis with IV contrast showed edema and hyperemia of the distal stomach.  Gastritis is suspected.  Abdominal ultrasound showed cholelithiasis with positive sonographic Jurado's sign, but no gallbladder wall thickening or clear ascites.  Appearance is equivocal for early cholecystitis.  No biliary dilatation.  Surgery was consulted in the ED.  He was admitted to Hospital Medicine for management.  GI took for upper endoscopy on 04/09/2024.  Revealed esophagitis and gastritis.  Biopsies were taken.  Due to continued pain he was scheduled for colonoscopy on 04/10 which showed normal colon & terminal ileum.  Will need follow up for HCV treatment outpatient post discharge. Surgery planned on cholecystectomy but then changed plans to performs surgery outpatient. To be followed up outpatient with Surgery.    Pt was seen and examined on the day of discharge. Reporting feeling well and had no new complaints.    Vitals:  VITAL SIGNS: 24 HRS MIN  "& MAX LAST   No data recorded 98.7 °F (37.1 °C)   No data recorded (!) 156/88   No data recorded  71   No data recorded 18   No data recorded 98 %       Physical Exam:  General: In no acute distress, afebrile  Chest: Clear to auscultation bilaterally  Heart: RRR, +S1, S2, no appreciable murmur  Abdomen: Soft, nontender, BS +  MSK: Warm, no lower extremity edema, no clubbing or cyanosis  Neurologic: Alert and oriented x4, Cranial nerve II-XII intact, Strength 5/5 in all 4 extremities    Procedures Performed: No admission procedures for hospital encounter.     Significant Diagnostic Studies: See Full reports for all details    No results for input(s): "WBC", "RBC", "HGB", "HCT", "MCV", "MCH", "MCHC", "RDW", "PLT", "MPV", "GRAN", "LYMPH", "MONO", "BASO", "NRBC" in the last 168 hours.    No results for input(s): "NA", "K", "CL", "CO2", "ANIONGAP", "BUN", "CREATININE", "GLU", "CALCIUM", "PH", "MG", "ALBUMIN", "PROT", "ALKPHOS", "ALT", "AST", "BILITOT" in the last 168 hours.     Microbiology Results (last 7 days)       ** No results found for the last 168 hours. **             X-Ray Abdomen AP 1 View  Narrative: EXAMINATION:  XR ABDOMEN AP 1 VIEW    CLINICAL HISTORY:  ileus;  Ileus, unspecified    TECHNIQUE:  Single-view of the abdomen    COMPARISON:  04/27/2024    FINDINGS:  Gas-filled loops of bowel again noted throughout the abdomen.  Postsurgical changes of the left hip unchanged.  Impression: As above.    Electronically signed by: Tai Valiente  Date:    04/28/2024  Time:    11:04         Medication List        START taking these medications      amLODIPine 10 MG tablet  Commonly known as: NORVASC  Take 1 tablet (10 mg total) by mouth once daily.     sucralfate 1 gram tablet  Commonly known as: CARAFATE  Take 1 tablet (1 g total) by mouth 4 (four) times daily.            CHANGE how you take these medications      pantoprazole 40 MG tablet  Commonly known as: PROTONIX  Take 1 tablet (40 mg total) by mouth 2 (two) " times daily.  What changed:   medication strength  how much to take  when to take this            CONTINUE taking these medications      lisinopriL 40 MG tablet  Commonly known as: PRINIVIL,ZESTRIL  Take 1 tablet (40 mg total) by mouth once daily.     metFORMIN 1000 MG tablet  Commonly known as: GLUCOPHAGE  Take 1 tablet (1,000 mg total) by mouth 2 (two) times daily with meals.            STOP taking these medications      indomethacin 25 MG capsule  Commonly known as: INDOCIN            ASK your doctor about these medications      HYDROcodone-acetaminophen 5-325 mg per tablet  Commonly known as: NORCO  Take 1 tablet by mouth every 12 (twelve) hours as needed for Pain.  Ask about: Should I take this medication?               Where to Get Your Medications        These medications [facility] to [facility] [address]  [address] [place] [address]      Phone: [phone]   amLODIPine 10 MG tablet  pantoprazole 40 MG tablet  sucralfate 1 gram tablet       You can get these medications from any pharmacy    Bring a paper prescription for each of these medications  HYDROcodone-acetaminophen 5-325 mg per tablet          Explained in detail to the patient about the discharge plan, medications, and follow-up visits. Pt understands and agrees with the treatment plan  Discharge Disposition: Home or Self Care   Discharged Condition: stable  Diet-    Medications Per PA med rec  Activities as tolerated   Follow-up Information       Clinics, [facility] Follow up.    Why: A referral was sent for you to GI and Infectious disease at [facility]. They will call you with an appointment  Contact information:  [address]  [place] [address] [phone]               Surgery, [facility] Follow up.    Why: OFFICE WILL CALL YOU WITH AN APPOINTMENT  Contact information:  [place] [address] [phone]                           For further questions  contact hospitalist office    Discharge time 33 minutes    For worsening symptoms, chest pain, shortness of breath, increased abdominal pain, high grade fever, stroke or stroke like symptoms, immediately go to the nearest Emergency Room or call 911 as soon as possible.      Bo Ventura M.D.

## 2024-05-21 NOTE — PLAN OF CARE
[facility]  Discharge Final Note    Primary Care Provider: Marilu Amezquita FNP    Expected Discharge Date: 4/30/2024    Final Discharge Note (most recent)       Final Note - 05/20/24 2003          Final Note    Assessment Type Final Discharge Note     Anticipated Discharge Disposition Planned Readmission - Short Term Hospital     What phone number can be called within the next 1-3 days to see how you are doing after discharge? [phone]     Hospital Resources/Appts/Education Provided Provided patient/caregiver with written discharge plan information        Post-Acute Status    Discharge Delays None known at this time                     Important Message from Medicare

## 2024-05-25 ENCOUNTER — HOSPITAL ENCOUNTER (EMERGENCY)
Facility: HOSPITAL | Age: 69
Discharge: HOME | End: 2024-05-26
Attending: INTERNAL MEDICINE
Payer: MEDICARE

## 2024-05-25 DIAGNOSIS — R10.84 GENERALIZED ABDOMINAL CRAMPING: Primary | ICD-10-CM

## 2024-05-25 DIAGNOSIS — K52.9 NONSPECIFIC COLITIS: ICD-10-CM

## 2024-05-25 DIAGNOSIS — R06.02 SOB (SHORTNESS OF BREATH): ICD-10-CM

## 2024-05-25 LAB
ALBUMIN SERPL-MCNC: 4.2 G/DL (ref 3.4–4.8)
ALBUMIN/GLOB SERPL: 1.2 RATIO (ref 1.1–2)
ALP SERPL-CCNC: 72 UNIT/L (ref 40–150)
ALT SERPL-CCNC: 30 UNIT/L (ref 0–55)
ANION GAP SERPL CALC-SCNC: 14 MEQ/L
AST SERPL-CCNC: 23 UNIT/L (ref 5–34)
BASOPHILS # BLD AUTO: 0.05 X10(3)/MCL
BASOPHILS NFR BLD AUTO: 0.8 %
BILIRUB SERPL-MCNC: 1.4 MG/DL
BUN SERPL-MCNC: 19 MG/DL (ref 8.4–25.7)
CALCIUM SERPL-MCNC: 10.1 MG/DL (ref 8.8–10)
CHLORIDE SERPL-SCNC: 104 MMOL/L (ref 98–107)
CO2 SERPL-SCNC: 21 MMOL/L (ref 23–31)
CREAT SERPL-MCNC: 0.83 MG/DL (ref 0.73–1.18)
CREAT/UREA NIT SERPL: 23
D DIMER PPP IA.FEU-MCNC: 0.42 UG/ML FEU (ref 0–0.5)
EOSINOPHIL # BLD AUTO: 0.02 X10(3)/MCL (ref 0–0.9)
EOSINOPHIL NFR BLD AUTO: 0.3 %
ERYTHROCYTE [DISTWIDTH] IN BLOOD BY AUTOMATED COUNT: 11.9 % (ref 11.5–17)
GFR SERPLBLD CREATININE-BSD FMLA CKD-EPI: >60 ML/MIN/1.73/M2
GLOBULIN SER-MCNC: 3.6 GM/DL (ref 2.4–3.5)
GLUCOSE SERPL-MCNC: 108 MG/DL (ref 82–115)
HCT VFR BLD AUTO: 48.2 % (ref 42–52)
HGB BLD-MCNC: 16.6 G/DL (ref 14–18)
IMM GRANULOCYTES # BLD AUTO: 0.02 X10(3)/MCL (ref 0–0.04)
IMM GRANULOCYTES NFR BLD AUTO: 0.3 %
LIPASE SERPL-CCNC: 24 U/L
LYMPHOCYTES # BLD AUTO: 3.06 X10(3)/MCL (ref 0.6–4.6)
LYMPHOCYTES NFR BLD AUTO: 47.9 %
MAGNESIUM SERPL-MCNC: 2 MG/DL (ref 1.6–2.6)
MCH RBC QN AUTO: 30.3 PG (ref 27–31)
MCHC RBC AUTO-ENTMCNC: 34.4 G/DL (ref 33–36)
MCV RBC AUTO: 88 FL (ref 80–94)
MONOCYTES # BLD AUTO: 0.51 X10(3)/MCL (ref 0.1–1.3)
MONOCYTES NFR BLD AUTO: 8 %
NEUTROPHILS # BLD AUTO: 2.73 X10(3)/MCL (ref 2.1–9.2)
NEUTROPHILS NFR BLD AUTO: 42.7 %
NRBC BLD AUTO-RTO: 0 %
PLATELET # BLD AUTO: 230 X10(3)/MCL (ref 130–400)
PMV BLD AUTO: 10.1 FL (ref 7.4–10.4)
POTASSIUM SERPL-SCNC: 4.5 MMOL/L (ref 3.5–5.1)
PROT SERPL-MCNC: 7.8 GM/DL (ref 5.8–7.6)
RBC # BLD AUTO: 5.48 X10(6)/MCL (ref 4.7–6.1)
SODIUM SERPL-SCNC: 139 MMOL/L (ref 136–145)
TROPONIN I SERPL-MCNC: <0.01 NG/ML (ref 0–0.04)
WBC # SPEC AUTO: 6.39 X10(3)/MCL (ref 4.5–11.5)

## 2024-05-25 PROCEDURE — 83690 ASSAY OF LIPASE: CPT | Performed by: PHYSICIAN ASSISTANT

## 2024-05-25 PROCEDURE — 25000003 PHARM REV CODE 250: Performed by: PHYSICIAN ASSISTANT

## 2024-05-25 PROCEDURE — 83735 ASSAY OF MAGNESIUM: CPT | Performed by: PHYSICIAN ASSISTANT

## 2024-05-25 PROCEDURE — 84484 ASSAY OF TROPONIN QUANT: CPT | Performed by: PHYSICIAN ASSISTANT

## 2024-05-25 PROCEDURE — 99285 EMERGENCY DEPT VISIT HI MDM: CPT | Mod: 25

## 2024-05-25 PROCEDURE — 83880 ASSAY OF NATRIURETIC PEPTIDE: CPT | Performed by: PHYSICIAN ASSISTANT

## 2024-05-25 PROCEDURE — 80053 COMPREHEN METABOLIC PANEL: CPT | Performed by: PHYSICIAN ASSISTANT

## 2024-05-25 PROCEDURE — 93005 ELECTROCARDIOGRAM TRACING: CPT

## 2024-05-25 PROCEDURE — 85379 FIBRIN DEGRADATION QUANT: CPT | Performed by: PHYSICIAN ASSISTANT

## 2024-05-25 PROCEDURE — 85025 COMPLETE CBC W/AUTO DIFF WBC: CPT | Performed by: PHYSICIAN ASSISTANT

## 2024-05-25 RX ORDER — HYDROCODONE BITARTRATE AND ACETAMINOPHEN 7.5; 325 MG/1; MG/1
1 TABLET ORAL ONCE
Status: COMPLETED | OUTPATIENT
Start: 2024-05-25 | End: 2024-05-25

## 2024-05-25 RX ADMIN — HYDROCODONE BITARTRATE AND ACETAMINOPHEN 1 TABLET: 7.5; 325 TABLET ORAL at 11:05

## 2024-05-26 VITALS
HEIGHT: 67 IN | RESPIRATION RATE: 16 BRPM | OXYGEN SATURATION: 100 % | DIASTOLIC BLOOD PRESSURE: 82 MMHG | HEART RATE: 76 BPM | SYSTOLIC BLOOD PRESSURE: 136 MMHG | TEMPERATURE: 99 F | WEIGHT: 117.63 LBS | BODY MASS INDEX: 18.46 KG/M2

## 2024-05-26 LAB
BACTERIA #/AREA URNS AUTO: ABNORMAL /HPF
BILIRUB UR QL STRIP.AUTO: NEGATIVE
BNP BLD-MCNC: <10 PG/ML
CLARITY UR: CLEAR
COLOR UR AUTO: YELLOW
GLUCOSE UR QL STRIP: ABNORMAL
HGB UR QL STRIP: NEGATIVE
HOLD SPECIMEN: NORMAL
HYALINE CASTS #/AREA URNS LPF: ABNORMAL /LPF
KETONES UR QL STRIP: NEGATIVE
LEUKOCYTE ESTERASE UR QL STRIP: NEGATIVE
MUCOUS THREADS URNS QL MICRO: ABNORMAL /LPF
NITRITE UR QL STRIP: NEGATIVE
PH UR STRIP: 6 [PH]
PROT UR QL STRIP: ABNORMAL
RBC #/AREA URNS AUTO: ABNORMAL /HPF
SP GR UR STRIP.AUTO: 1.02 (ref 1–1.03)
SQUAMOUS #/AREA URNS LPF: ABNORMAL /HPF
WBC #/AREA URNS AUTO: ABNORMAL /HPF

## 2024-05-26 PROCEDURE — 25500020 PHARM REV CODE 255: Performed by: PHYSICIAN ASSISTANT

## 2024-05-26 PROCEDURE — 81001 URINALYSIS AUTO W/SCOPE: CPT | Performed by: PHYSICIAN ASSISTANT

## 2024-05-26 RX ADMIN — IOHEXOL 100 ML: 350 INJECTION, SOLUTION INTRAVENOUS at 01:05

## 2024-05-26 NOTE — ED PROVIDER NOTES
Encounter Date: 5/25/2024       History     Chief Complaint   Patient presents with    Shortness of Breath    Abdominal Pain    Diarrhea     States above starting 3 days ago.  However, has not been eating well since lap josiah 3 weeks ago.       68-year-old male with past medical history significant for hypertension, diabetes and malnutrition who is s/p laparoscopic cholecystectomy for chronic cholecystitis on 4/22/24 who was discharged 4/26/24 and then readmitted 4/27/24 for ileus and gallbladder fossa abscess and discharge 4/30/2024 presents to ED complaining of approximately one-week history generalized abdominal cramping, postprandial diarrhea, dyspnea on exertion and chest pain on exertion.  Patient reports abdominal cramping seems to be relieved with eating, but then returns after an hour or so.  Denies dysuria or hematuria, but does report that urine seems to be darker than normal.  Denies nausea, vomiting, blood in stool, anorexia, abdominal distention, fever, chills, palpitations, diaphoresis, syncope, edema, calf pain, hemoptysis.  Vital signs stable on arrival, patient in no acute distress.      Review of patient's allergies indicates:  No Known Allergies  Past Medical History:   Diagnosis Date    Chronic low back pain with left-sided sciatica     Diabetes mellitus     HTN (hypertension)      Past Surgical History:   Procedure Laterality Date    COLONOSCOPY N/A 4/10/2024    Procedure: COLON;  Surgeon: Ravi Petit MD;  Location: [facility] ENDOSCOPY;  Service: Gastroenterology;  Laterality: N/A;    EGD, WITH CLOSED BIOPSY  4/9/2024    Procedure: EGD, WITH CLOSED BIOPSY;  Surgeon: Ravi Petit MD;  Location: [facility] ENDOSCOPY;  Service: Gastroenterology;;    ESOPHAGOGASTRODUODENOSCOPY N/A 4/9/2024    Procedure: EGD;  Surgeon: Ravi Petit MD;  Location: [facility] ENDOSCOPY;  Service: Gastroenterology;  Laterality: N/A;    HIP SURGERY Left     LAPAROSCOPIC CHOLECYSTECTOMY N/A 4/22/2024     Procedure: CHOLECYSTECTOMY, LAPAROSCOPIC;  Surgeon: Demetrice Gardner MD;  Location: [facility];  Service: General;  Laterality: N/A;    ORIF HIP FRACTURE Right     >10 years     Family History   Problem Relation Name Age of Onset    No Known Problems Mother      Diabetes Father       Social History     Tobacco Use    Smoking status: Every Day     Current packs/day: 0.50     Average packs/day: 0.5 packs/day for 52.4 years (26.2 ttl pk-yrs)     Types: Cigarettes     Start date: 1/1/1972    Smokeless tobacco: Never   Substance Use Topics    Alcohol use: Not Currently     Comment: occ    Drug use: Never     Review of Systems   All other systems reviewed and are negative.      Physical Exam     Initial Vitals [05/25/24 2047]   BP Pulse Resp Temp SpO2   119/80 88 19 98.8 °F (37.1 °C) 100 %      MAP       --         Physical Exam    Nursing note and vitals reviewed.  Constitutional: He appears well-developed and well-nourished. He is not diaphoretic. He appears cachectic.  Non-toxic appearance. He does not have a sickly appearance. He does not appear ill. No distress.   HENT:   Head: Normocephalic and atraumatic.   Eyes: Conjunctivae are normal. Pupils are equal, round, and reactive to light. No scleral icterus.   Neck: Neck supple.   Normal range of motion.  Cardiovascular:  Normal rate, regular rhythm, normal heart sounds and intact distal pulses.     Exam reveals no gallop and no friction rub.       No murmur heard.  Pulmonary/Chest: Breath sounds normal. No respiratory distress. He has no wheezes. He has no rhonchi. He has no rales.   Abdominal: Abdomen is soft. Bowel sounds are normal. He exhibits no distension. A surgical scar is present. There is generalized abdominal tenderness. No hernia.   Laparoscopic surgical wounds healing well without signs of infection   No right CVA tenderness.  No left CVA tenderness. There is no rebound, no guarding and no tenderness at McBurney's point. negative Rovsing's  sign  Musculoskeletal:         General: No tenderness or edema. Normal range of motion.      Cervical back: Normal range of motion and neck supple.     Neurological: He is alert and oriented to person, place, and time. No cranial nerve deficit.   Skin: Skin is warm. Capillary refill takes less than 2 seconds. No pallor.   Psychiatric: He has a normal mood and affect.         ED Course   Procedures  Orders Placed This Encounter   Procedures    X-Ray Chest 1 View    X-Ray Abdomen Flat And Erect    CT Abdomen Pelvis With IV Contrast NO Oral Contrast    CBC Auto Differential    Comprehensive Metabolic Panel    Lipase    Urinalysis, Reflex to Urine Culture    Magnesium    Troponin I    BNP    D dimer, quantitative    CBC with Differential    EXTRA TUBES    Red Top Hold    Light Green Top Hold    Light Green Top Hold    Gold Top Hold    Ambulatory referral/consult to General Surgery    Cardiac Monitoring - Adult    Pulse Oximetry Continuous    EKG 12-lead    Saline lock IV     Medications   HYDROcodone-acetaminophen 7.5-325 mg per tablet 1 tablet (1 tablet Oral Given 5/25/24 7636)   iohexoL (OMNIPAQUE 350) injection 100 mL (100 mLs Intravenous Given 5/26/24 0154)     Admission on 05/25/2024   Component Date Value Ref Range Status    Sodium 05/25/2024 139  136 - 145 mmol/L Final    Potassium 05/25/2024 4.5  3.5 - 5.1 mmol/L Final    Chloride 05/25/2024 104  98 - 107 mmol/L Final    CO2 05/25/2024 21 (L)  23 - 31 mmol/L Final    Glucose 05/25/2024 108  82 - 115 mg/dL Final    Blood Urea Nitrogen 05/25/2024 19.0  8.4 - 25.7 mg/dL Final    Creatinine 05/25/2024 0.83  0.73 - 1.18 mg/dL Final    Calcium 05/25/2024 10.1 (H)  8.8 - 10.0 mg/dL Final    Protein Total 05/25/2024 7.8 (H)  5.8 - 7.6 gm/dL Final    Albumin 05/25/2024 4.2  3.4 - 4.8 g/dL Final    Globulin 05/25/2024 3.6 (H)  2.4 - 3.5 gm/dL Final    Albumin/Globulin Ratio 05/25/2024 1.2  1.1 - 2.0 ratio Final    Bilirubin Total 05/25/2024 1.4  <=1.5 mg/dL Final    ALP  05/25/2024 72  40 - 150 unit/L Final    ALT 05/25/2024 30  0 - 55 unit/L Final    AST 05/25/2024 23  5 - 34 unit/L Final    eGFR 05/25/2024 >60  mL/min/1.73/m2 Final    Anion Gap 05/25/2024 14.0  mEq/L Final    BUN/Creatinine Ratio 05/25/2024 23   Final    Lipase Level 05/25/2024 24  <=60 U/L Final    Color, UA 05/26/2024 Yellow  Yellow, Light-Yellow, Dark Yellow, Elizabeth, Straw Final    Appearance, UA 05/26/2024 Clear  Clear Final    Specific Gravity, UA 05/26/2024 1.022  1.005 - 1.030 Final    pH, UA 05/26/2024 6.0  5.0 - 8.5 Final    Protein, UA 05/26/2024 1+ (A)  Negative Final    Glucose, UA 05/26/2024 1+ (A)  Negative, Normal Final    Ketones, UA 05/26/2024 Negative  Negative Final    Blood, UA 05/26/2024 Negative  Negative Final    Bilirubin, UA 05/26/2024 Negative  Negative Final    Nitrites, UA 05/26/2024 Negative  Negative Final    Leukocyte Esterase, UA 05/26/2024 Negative  Negative Final    WBC, UA 05/26/2024 0-5  None Seen, 0-2, 3-5, 0-5 /HPF Final    Bacteria, UA 05/26/2024 None Seen  None Seen /HPF Final    Squamous Epithelial Cells, UA 05/26/2024 None Seen  None Seen /HPF Final    Mucous, UA 05/26/2024 Moderate (A)  None Seen /LPF Final    Hyaline Casts, UA 05/26/2024 0-2 (A)  None Seen /lpf Final    RBC, UA 05/26/2024 None Seen  None Seen, 0-2, 3-5, 0-5 /HPF Final    Magnesium Level 05/25/2024 2.00  1.60 - 2.60 mg/dL Final    Troponin-I 05/25/2024 <0.010  0.000 - 0.045 ng/mL Final    Natriuretic Peptide 05/25/2024 <10.0  <=100.0 pg/mL Final    D-Dimer 05/25/2024 0.42  0.00 - 0.50 ug/mL FEU Final    WBC 05/25/2024 6.39  4.50 - 11.50 x10(3)/mcL Final    RBC 05/25/2024 5.48  4.70 - 6.10 x10(6)/mcL Final    Hgb 05/25/2024 16.6  14.0 - 18.0 g/dL Final    Hct 05/25/2024 48.2  42.0 - 52.0 % Final    MCV 05/25/2024 88.0  80.0 - 94.0 fL Final    MCH 05/25/2024 30.3  27.0 - 31.0 pg Final    MCHC 05/25/2024 34.4  33.0 - 36.0 g/dL Final    RDW 05/25/2024 11.9  11.5 - 17.0 % Final    Platelet 05/25/2024 230  130 -  400 x10(3)/mcL Final    MPV 05/25/2024 10.1  7.4 - 10.4 fL Final    Neut % 05/25/2024 42.7  % Final    Lymph % 05/25/2024 47.9  % Final    Mono % 05/25/2024 8.0  % Final    Eos % 05/25/2024 0.3  % Final    Basophil % 05/25/2024 0.8  % Final    Lymph # 05/25/2024 3.06  0.6 - 4.6 x10(3)/mcL Final    Neut # 05/25/2024 2.73  2.1 - 9.2 x10(3)/mcL Final    Mono # 05/25/2024 0.51  0.1 - 1.3 x10(3)/mcL Final    Eos # 05/25/2024 0.02  0 - 0.9 x10(3)/mcL Final    Baso # 05/25/2024 0.05  <=0.2 x10(3)/mcL Final    IG# 05/25/2024 0.02  0 - 0.04 x10(3)/mcL Final    IG% 05/25/2024 0.3  % Final    NRBC% 05/25/2024 0.0  % Final    Extra Tube 05/25/2024 Hold for add-ons.   Final    Extra Tube 05/25/2024 Hold for add-ons.   Final    Extra Tube 05/25/2024 Hold for add-ons.   Final    Extra Tube 05/25/2024 Hold for add-ons.   Final         Labs Reviewed   COMPREHENSIVE METABOLIC PANEL - Abnormal; Notable for the following components:       Result Value    CO2 21 (*)     Calcium 10.1 (*)     Protein Total 7.8 (*)     Globulin 3.6 (*)     All other components within normal limits   URINALYSIS, REFLEX TO URINE CULTURE - Abnormal; Notable for the following components:    Protein, UA 1+ (*)     Glucose, UA 1+ (*)     Mucous, UA Moderate (*)     Hyaline Casts, UA 0-2 (*)     All other components within normal limits   LIPASE - Normal   MAGNESIUM - Normal   TROPONIN I - Normal   B-TYPE NATRIURETIC PEPTIDE - Normal   D DIMER, QUANTITATIVE - Normal   CBC W/ AUTO DIFFERENTIAL    Narrative:     The following orders were created for panel order CBC Auto Differential.  Procedure                               Abnormality         Status                     ---------                               -----------         ------                     CBC with Differential[[id number]]                           Final result                 Please view results for these tests on the individual orders.   CBC WITH DIFFERENTIAL   EXTRA TUBES    Narrative:     The  following orders were created for panel order EXTRA TUBES.  Procedure                               Abnormality         Status                     ---------                               -----------         ------                     Red Top Hold[[id number]]                                    Final result               Light Green Top Hold[[id number]]                            Final result               Light Green Top Hold[[id number]]                            Final result               Gold Top Hold[[id number]]                                   Final result                 Please view results for these tests on the individual orders.   RED TOP HOLD   LIGHT GREEN TOP HOLD   LIGHT GREEN TOP HOLD   GOLD TOP HOLD     EKG Readings: (Independently Interpreted)   Initial Reading: No STEMI. Previous EKG: Compared with most recent EKG Previous EKG Date: 4/25/24. Rhythm: Normal Sinus Rhythm. Heart Rate: 74. Ectopy: No Ectopy. Conduction: Normal. ST Segments: Normal ST Segments. Axis: Normal. Clinical Impression: Normal Sinus Rhythm       Imaging Results              CT Abdomen Pelvis With IV Contrast NO Oral Contrast (Preliminary result)  Result time 05/26/24 02:18:57      Preliminary result by Alberto Harrington MD (05/26/24 02:18:57)                   Narrative:    START OF REPORT:  Technique: CT of the abdomen and pelvis was performed with axial images as well as sagittal and coronal reconstruction images with intravenous contrast.    Comparison: None available.    Clinical History: Shortness of Breath Abdominal Pain Diarrhea(States above starting 3 days ago. However, has not been eating well since lap josiah 3 weeks ago.    Dosage Information: Automated Exposure Control was utilized.    Findings:  Lines and Tubes: None.  Thorax:  Lungs: The visualized lung bases appear unremarkable. No focal infiltrate or consolidation is seen.  Pleura: No effusions or thickening.  Heart: The heart size is within normal  limits.  Abdomen:  Abdominal Wall: No abdominal wall pathology is seen.  Liver: Mild fatty infiltration of the liver is present. The liver otherwise appears unremarkable.  Biliary System: No intrahepatic or extrahepatic biliary duct dilatation is seen.  Gallbladder: Surgical clips are seen in the gallbladder fossa consistent with prior cholecystectomy.  Pancreas: The pancreas appears unremarkable.  Spleen: The spleen appears unremarkable.  Adrenals: The adrenal glands appear unremarkable.  Kidneys: The kidneys appear unremarkable with no stones cysts masses or hydronephrosis.  Aorta: There is moderate scattered calcification of the abdominal aorta and its branches.  IVC: Unremarkable.  Bowel:  Esophagus: The visualized esophagus appears unremarkable.  Stomach: The stomach appears unremarkable.  Duodenum: Unremarkable appearing duodenum.  Small Bowel: The small bowel appears unremarkable.  Colon: There is moderate fluid in portions of the colon which is otherwise nondistended. There are areas of mucosal enhancement of the walls of the colon.  Appendix: The appendix appears unremarkable and is partially seen on Image 120, Series 2.  Peritoneum: No intraperitoneal free air or ascites is seen.    Pelvis:  Bladder: The bladder appears unremarkable.  Male:  Prostate gland: The prostate gland appears unremarkable.    Bony structures:  Dorsal Spine: The visualized dorsal spine appears unremarkable.  Bony Pelvis: The visualized bony structures of the pelvis appear unremarkable. A left femoral prosthetic device is noted in place.      Impression:  1. There is moderate fluid in portions of the colon which is otherwise nondistended. There are areas of mucosal enhancement of the walls of the colon. These findings could reflect an element of colitis consistent with history of diarrhea. Follow-up as clinically indicated.  2. No definitive acute intraabdominal or pelvic solid organ or bowel pathology identified. Details and other  findings as discussed above.                                         X-Ray Abdomen Flat And Erect (Preliminary result)  Result time 05/26/24 01:06:12      Wet Read by Román Locke PA (05/26/24 01:06:12, [facility], Emergency Medicine)    Gas-filled loops of bowel noted throughout abdomen.  Few air-fluid levels on left side of abdomen.                                     X-Ray Chest 1 View (Preliminary result)  Result time 05/26/24 01:02:42      Wet Read by Román Locke PA (05/26/24 01:02:42, [facility], Emergency Medicine)    No acute cardiopulmonary abnormality.                                     Medications   HYDROcodone-acetaminophen 7.5-325 mg per tablet 1 tablet (1 tablet Oral Given 5/25/24 5298)   iohexoL (OMNIPAQUE 350) injection 100 mL (100 mLs Intravenous Given 5/26/24 3864)     Medical Decision Making  Differential diagnosis: Includes but not limited to ileus, bowel obstruction, pancreatitis, cholangitis, gastritis, PUD, intra-abdominal abscess, PE, ACS, pneumonia, pneumothorax    ED management:  Patient given p.o. Norco, pain improved.      ED course:  Patient has unremarkable abdominal exam without rebound, guarding, rigidity or distension.  CBC wholly unremarkable, including no leukocytosis or left shift to suggest acute bacterial infection.  CMP consistent with mild volume contraction, but overall unremarkable, including no evidence of acute hepatobiliary pathology or significant electrolyte derangement.  Magnesium within normal limits.  Lipase within normal limits, low suspicion for acute pancreatitis as cause of abdominal pain.  D-dimer within normal limits, low suspicion for PE as cause of dyspnea on exertion.  EKG nonischemic.  Troponin within normal limits.  Patient has a low risk heart and LIZBETH scores, low suspicion for ACS.  BNP within normal limits and no evidence of fluid overload on exam, low suspicion for acute CHF.  Chest x-ray  unremarkable with no evidence of acute cardiopulmonary abnormality.  Abdominal x-ray reveals gas-filled loops of bowel throughout with few air-fluid levels on left side of abdomen.  Follow up CT ordered and patient transitioned to Dr. Stahl while awaiting the results.    Amount and/or Complexity of Data Reviewed  Labs: ordered. Decision-making details documented in ED Course.  Radiology: ordered and independent interpretation performed. Decision-making details documented in ED Course.  ECG/medicine tests: ordered and independent interpretation performed. Decision-making details documented in ED Course.    Risk  Prescription drug management.               ED Course as of 05/26/24 0239   Sun May 26, 2024   0231 I am Dr. Stahl I assumed the care of patient at the end of the shift for HENRRY Tejeda.    Patient presented to the emergency room with a 1 week history of cramping abdominal pain, which is generalized, also having diarrhea after eating food, some dyspnea on exertion, he says abdominal cramping gets better by eating,    Patient has a cholecystectomy, after that he had a abscess for which he had to be treated, so a detailed workup was ordered.  His white count is normal, so is rest of the CBC, cardiac workup is negative, urine does not show any major abnormality, x-ray of the abdomen showed some gas and loops of bowels.  He does not have any major electrolyte abnormality, he does not have any major dehydration, and then symptoms have been going on for a week, so a CT scan was ordered which was pending.     [GQ]   0234 CT scan is reported to be negative for any acute abdomen, but he does have some colitis, no particular perforation or obstruction of the bowels, [GQ]   0234 Since his the CBC is normal, and rest of the workup is also essentially normal, I will advise him to follow up with the surgery, for re-evaluation, they can possibly do a colonoscopy if needed, and see from there. [GQ]   0238 I am going  to let him go home with a consult for surgery to follow up.  As such she does not have any signs of acute abdomen for which he needs to be admitted in the hospital at this time.  Symptoms have been going on for some time, [GQ]      ED Course User Index  [GQ] George Stahl MD                           Clinical Impression:  Final diagnoses:  [R06.02] SOB (shortness of breath)  [R10.84] Generalized abdominal cramping (Primary)  [K52.9] Nonspecific colitis          ED Disposition Condition    Discharge Stable          ED Prescriptions    None       Follow-up Information       Follow up With Specialties Details Why Contact Info    Marilu Amezquita, FNP Family Medicine In 2 days  [address] [facility] [address]  [phone]               George Stahl MD  05/26/24 6732

## 2024-05-27 LAB
OHS QRS DURATION: 94 MS
OHS QTC CALCULATION: 428 MS

## 2024-06-14 ENCOUNTER — TELEPHONE (OUTPATIENT)
Dept: INTERNAL MEDICINE | Facility: CLINIC | Age: 69
End: 2024-06-14
Payer: MEDICARE

## 2024-06-17 ENCOUNTER — OFFICE VISIT (OUTPATIENT)
Dept: INTERNAL MEDICINE | Facility: CLINIC | Age: 69
End: 2024-06-17
Payer: MEDICARE

## 2024-06-17 ENCOUNTER — HOSPITAL ENCOUNTER (OUTPATIENT)
Dept: RADIOLOGY | Facility: HOSPITAL | Age: 69
Discharge: HOME | End: 2024-06-17
Attending: NURSE PRACTITIONER
Payer: MEDICARE

## 2024-06-17 VITALS
WEIGHT: 113.81 LBS | OXYGEN SATURATION: 100 % | SYSTOLIC BLOOD PRESSURE: 108 MMHG | DIASTOLIC BLOOD PRESSURE: 69 MMHG | BODY MASS INDEX: 17.86 KG/M2 | HEIGHT: 67 IN | RESPIRATION RATE: 16 BRPM | HEART RATE: 92 BPM | TEMPERATURE: 98 F

## 2024-06-17 DIAGNOSIS — M54.42 CHRONIC LEFT-SIDED LOW BACK PAIN WITH LEFT-SIDED SCIATICA: ICD-10-CM

## 2024-06-17 DIAGNOSIS — E11.65 UNCONTROLLED TYPE 2 DIABETES MELLITUS WITH HYPERGLYCEMIA: ICD-10-CM

## 2024-06-17 DIAGNOSIS — I10 PRIMARY HYPERTENSION: Primary | ICD-10-CM

## 2024-06-17 DIAGNOSIS — G89.29 CHRONIC LEFT-SIDED LOW BACK PAIN WITH LEFT-SIDED SCIATICA: ICD-10-CM

## 2024-06-17 DIAGNOSIS — R55 SYNCOPE AND COLLAPSE: ICD-10-CM

## 2024-06-17 DIAGNOSIS — E11.9 CONTROLLED TYPE 2 DIABETES MELLITUS WITHOUT COMPLICATION, WITHOUT LONG-TERM CURRENT USE OF INSULIN: ICD-10-CM

## 2024-06-17 PROCEDURE — 1159F MED LIST DOCD IN RCRD: CPT | Mod: CPTII,,, | Performed by: NURSE PRACTITIONER

## 2024-06-17 PROCEDURE — 99215 OFFICE O/P EST HI 40 MIN: CPT | Mod: PBBFAC,25 | Performed by: NURSE PRACTITIONER

## 2024-06-17 PROCEDURE — 3008F BODY MASS INDEX DOCD: CPT | Mod: CPTII,,, | Performed by: NURSE PRACTITIONER

## 2024-06-17 PROCEDURE — 4010F ACE/ARB THERAPY RXD/TAKEN: CPT | Mod: CPTII,,, | Performed by: NURSE PRACTITIONER

## 2024-06-17 PROCEDURE — 1126F AMNT PAIN NOTED NONE PRSNT: CPT | Mod: CPTII,,, | Performed by: NURSE PRACTITIONER

## 2024-06-17 PROCEDURE — 1160F RVW MEDS BY RX/DR IN RCRD: CPT | Mod: CPTII,,, | Performed by: NURSE PRACTITIONER

## 2024-06-17 PROCEDURE — 81001 URINALYSIS AUTO W/SCOPE: CPT

## 2024-06-17 PROCEDURE — 3074F SYST BP LT 130 MM HG: CPT | Mod: CPTII,,, | Performed by: NURSE PRACTITIONER

## 2024-06-17 PROCEDURE — 71046 X-RAY EXAM CHEST 2 VIEWS: CPT | Mod: TC

## 2024-06-17 PROCEDURE — 3078F DIAST BP <80 MM HG: CPT | Mod: CPTII,,, | Performed by: NURSE PRACTITIONER

## 2024-06-17 PROCEDURE — 99214 OFFICE O/P EST MOD 30 MIN: CPT | Mod: S$PBB,,, | Performed by: NURSE PRACTITIONER

## 2024-06-17 PROCEDURE — 3046F HEMOGLOBIN A1C LEVEL >9.0%: CPT | Mod: CPTII,,, | Performed by: NURSE PRACTITIONER

## 2024-06-17 RX ORDER — DICLOFENAC SODIUM 75 MG/1
75 TABLET, DELAYED RELEASE ORAL 2 TIMES DAILY PRN
Qty: 60 TABLET | Refills: 4 | Status: SHIPPED | OUTPATIENT
Start: 2024-06-17

## 2024-06-17 RX ORDER — VELPATASVIR AND SOFOSBUVIR 100; 400 MG/1; MG/1
1 TABLET, FILM COATED ORAL DAILY
COMMUNITY

## 2024-06-17 RX ORDER — METFORMIN HYDROCHLORIDE 1000 MG/1
1000 TABLET ORAL 2 TIMES DAILY WITH MEALS
Qty: 180 TABLET | Refills: 1 | Status: SHIPPED | OUTPATIENT
Start: 2024-06-17

## 2024-06-17 RX ORDER — LISINOPRIL 40 MG/1
40 TABLET ORAL DAILY
Qty: 90 TABLET | Refills: 1 | Status: SHIPPED | OUTPATIENT
Start: 2024-06-17 | End: 2025-06-17

## 2024-06-17 NOTE — PROGRESS NOTES
Patient ID: [id number]     Chief Complaint: Blood Pressure Check        HPI:     HPI      Heath Holder is a 68 y.o. male here today for a follow up BP check. Pt NS prior appt 5-24-24, 5-6-24, 4-18-24. Last A1c 9.1. Pt states he had 2 episodes of passing out 1 week ago- pt states he did not go to ER. Pulse ox on room air 100%.           -------------------------------------    Chronic low back pain with left-sided sciatica    Diabetes mellitus    HTN (hypertension)        Past Surgical History:   Procedure Laterality Date    COLONOSCOPY N/A 4/10/2024    Procedure: COLON;  Surgeon: Ravi Petit MD;  Location: [facility] ENDOSCOPY;  Service: Gastroenterology;  Laterality: N/A;    EGD, WITH CLOSED BIOPSY  4/9/2024    Procedure: EGD, WITH CLOSED BIOPSY;  Surgeon: Ravi Petit MD;  Location: [facility] ENDOSCOPY;  Service: Gastroenterology;;    ESOPHAGOGASTRODUODENOSCOPY N/A 4/9/2024    Procedure: EGD;  Surgeon: Ravi Petit MD;  Location: [facility] ENDOSCOPY;  Service: Gastroenterology;  Laterality: N/A;    HIP SURGERY Left     LAPAROSCOPIC CHOLECYSTECTOMY N/A 4/22/2024    Procedure: CHOLECYSTECTOMY, LAPAROSCOPIC;  Surgeon: Demetrice Gardner MD;  Location: [facility];  Service: General;  Laterality: N/A;    ORIF HIP FRACTURE Right     >10 years       Review of patient's allergies indicates:  No Known Allergies    Current Outpatient Medications   Medication Instructions    amLODIPine (NORVASC) 10 mg, Oral, Daily    diclofenac (VOLTAREN) 75 mg, Oral, 2 times daily PRN    EPCLUSA 400-100 mg Tab 1 tablet, Oral, Daily    HYDROcodone-acetaminophen (NORCO) 5-325 mg per tablet 1 tablet, Oral, Every 6 hours PRN    lisinopriL (PRINIVIL,ZESTRIL) 40 mg, Oral, Daily    metFORMIN (GLUCOPHAGE) 1,000 mg, Oral, 2 times daily with meals    pantoprazole (PROTONIX) 40 mg, Oral, 2 times daily    polyethylene glycol (GLYCOLAX) 17 g, Oral, Daily    sucralfate (CARAFATE) 1 g, Oral, 4 times daily    traMADoL (ULTRAM) 50 mg, Oral,  Every 6 hours PRN       Social History     Socioeconomic History    Marital status:    Tobacco Use    Smoking status: Every Day     Current packs/day: 0.50     Average packs/day: 0.5 packs/day for 52.5 years (26.2 ttl pk-yrs)     Types: Cigarettes     Start date: 1/1/1972    Smokeless tobacco: Never   Substance and Sexual Activity    Alcohol use: Not Currently     Comment: occ    Drug use: Never     Social Determinants of Health     Financial Resource Strain: Low Risk  (4/28/2024)    Overall Financial Resource Strain (CARDIA)     Difficulty of Paying Living Expenses: Not hard at all   Food Insecurity: No Food Insecurity (4/28/2024)    Hunger Vital Sign     Worried About Running Out of Food in the Last Year: Never true     Ran Out of Food in the Last Year: Never true   Transportation Needs: No Transportation Needs (4/28/2024)    PRAPARE - Transportation     Lack of Transportation (Medical): No     Lack of Transportation (Non-Medical): No   Physical Activity: Inactive (4/26/2024)    Exercise Vital Sign     Days of Exercise per Week: 0 days     Minutes of Exercise per Session: 0 min   Stress: Stress Concern Present (4/28/2024)    [employer] - Occupational Stress Questionnaire     Feeling of Stress : To some extent   Housing Stability: Low Risk  (4/28/2024)    Housing Stability Vital Sign     Unable to Pay for Housing in the Last Year: No     Homeless in the Last Year: No        Family History   Problem Relation Name Age of Onset    No Known Problems Mother      Diabetes Father          Patient Care Team:  Marilu Amezquita FNP as PCP - General (Family Medicine)  Demetrice Valdez FNP as Nurse Practitioner (Infectious Diseases)     Subjective:     Review of Systems     See HPI for details    Constitutional: Denies Change in appetite. Denies Chills. Denies Fever. Denies Night sweats.  Eye: Denies Blurred vision. Denies Discharge. Denies Eye pain.  ENT: Denies Decreased hearing.  "Denies Sore throat. Denies Swollen glands.  Respiratory: Denies Cough. Denies Shortness of breath. Denies Shortness of breath with exertion. Denies Wheezing.  Cardiovascular: DeniesChest pain at rest. Denies Chest pain with exertion. Denies Irregular heartbeat. Denies Palpitations. Denies Edema.  Gastrointestinal: Denies Abdominal pain. DeniesDiarrhea. Denies Nausea. Denies Vomiting. Denies Hematemesis or Hematochezia.  Genitourinary: Denies Dysuria. Denies Urinary frequency. Denies Urinary urgency. Denies Blood in urine.  Endocrine: Denies Cold intolerance. Denies Excessive thirst. Denies Heat intolerance. Denies Weight loss. Denies Weight gain.  Musculoskeletal: Denies Painful joints. Denies Weakness.  Integumentary: Denies Rash. Denies Itching. Denies Dry skin.  Neurologic: Denies Dizziness. Admits Fainting. Denies Headache.  Psychiatric: Denies Depression. Denies Anxiety. Denies Suicidal/Homicidal ideations.    All Other ROS: Negative except as stated in HPI.       Objective:     Visit Vitals  /69 (BP Location: Left arm, Patient Position: Sitting, BP Method: Small (Automatic))   Pulse 92   Temp 98.3 °F (36.8 °C) (Oral)   Resp 16   Ht 5' 7" (1.702 m)   Wt 51.6 kg (113 lb 12.8 oz)   SpO2 100%   BMI 17.82 kg/m²       Physical Exam    General: Alert and oriented, No acute distress.  Head: Normocephalic, Atraumatic.  Eye: Pupils are equal, round and reactive to light, Extraocular movements are intact, Sclera non-icteric.  Ears/Nose/Throat: Normal, Mucosa moist,Clear.  Neck/Thyroid: Supple, Non-tender, No carotid bruit, No lymphadenopathy, No JVD, Full range of motion.  Respiratory: Clear to auscultation bilaterally; No wheezes, rales or rhonchi,Non-labored respirations, Symmetrical chest wall expansion.  Cardiovascular: Regular rate and rhythm, S1/S2 normal, No murmurs, rubs or gallops.  Gastrointestinal: Soft, Non-tender, Non-distended, Normal bowel sounds, No palpable organomegaly.  Musculoskeletal: Normal " range of motion.  Integumentary: Warm, Dry, Intact, No suspicious lesions or rashes.  Extremities: No clubbing, cyanosis or edema  Neurologic: No focal deficits, Cranial Nerves II-XII are grossly intact, Motor strength normal upper and lower extremities, Sensory exam intact.  Psychiatric: Normal interaction, Coherent speech, Euthymic mood, Appropriate affect       Labs Reviewed:     Chemistry:  Lab Results   Component Value Date     06/17/2024    K 4.0 06/17/2024    BUN 15.9 06/17/2024    CREATININE 0.79 06/17/2024    EGFRNORACEVR >60 06/17/2024    GLUCOSE 123 (H) 06/17/2024    CALCIUM 10.2 (H) 06/17/2024    ALKPHOS 67 06/17/2024    LABPROT 7.5 06/17/2024    LABPROT 13.1 06/17/2024    ALBUMIN 4.2 06/17/2024    BILIDIR 0.4 04/26/2024    IBILI 0.40 04/26/2024    AST 24 06/17/2024    ALT 22 06/17/2024    MG 2.00 05/25/2024    PHOS 1.8 (L) 04/30/2024        Lab Results   Component Value Date    HGBA1C 9.3 (H) 03/18/2024        Hematology:  Lab Results   Component Value Date    WBC 8.21 06/17/2024    HGB 16.9 06/17/2024    HCT 47.7 06/17/2024     06/17/2024       Lipid Panel:  Lab Results   Component Value Date    CHOL 160 03/18/2024    HDL 55 03/18/2024    LDL 92.00 03/18/2024    TRIG 67 03/18/2024    TOTALCHOLEST 3 03/18/2024        Urine:  Lab Results   Component Value Date    APPEARANCEUA Clear 06/17/2024    SGUA 1.016 06/17/2024    PROTEINUA Trace (A) 06/17/2024    KETONESUA Negative 06/17/2024    LEUKOCYTESUR Negative 06/17/2024    RBCUA 0-5 06/17/2024    WBCUA 0-5 06/17/2024    BACTERIA None Seen 06/17/2024    SQEPUA Trace (A) 06/17/2024    HYALINECASTS None Seen 06/17/2024        Assessment:       ICD-10-CM ICD-9-CM   1. Primary hypertension  I10 401.9   2. Uncontrolled type 2 diabetes mellitus with hyperglycemia  E11.65 250.02   3. Syncope and collapse  R55 780.2   4. Controlled type 2 diabetes mellitus without complication, without long-term current use of insulin  E11.9 250.00   5. Chronic  left-sided low back pain with left-sided sciatica  M54.42 724.2    G89.29 724.3     338.29        Plan:     1. Primary hypertension  BP controlled. Low fat low salt diet and exercise. Cont Amlodipine, Lisinopril as prescribed.     2. Uncontrolled type 2 diabetes mellitus with hyperglycemia  Last A1c 9.1. ADA diet and exercise. Cont Metformin as prescribed. A1c, BMP in 1 month. Urine microalbumin with next visit. DM foot exam on next visit. Dm eye exam on next visit.      3. Back pain  Refilled Diclofenac 75 mg 1 tab po BID prn pain.       Follow up in about 1 month (around 7/17/2024) for with labs 1 week prior to appt. . In addition to their scheduled follow up, the patient has also been instructed to follow up on as needed basis.     Future Appointments   Date Time Provider Department Center   6/27/2024 11:10 AM Demetrice Valdez FNP [facility]

## 2024-06-19 ENCOUNTER — TELEPHONE (OUTPATIENT)
Dept: INTERNAL MEDICINE | Facility: CLINIC | Age: 69
End: 2024-06-19
Payer: MEDICARE

## 2024-06-19 NOTE — TELEPHONE ENCOUNTER
----- Message from RAY Crawford sent at 6/18/2024  7:46 PM CDT -----  Regarding: RE: Need new order due to exp date  Done, order extended as requested.  ----- Message -----  From: Caro Laurent LPN  Sent: 6/18/2024   2:41 PM CDT  To: RAY Crawford  Subject: FW: Need new order due to exp date                 ----- Message -----  From: Aida Gonzales  Sent: 6/18/2024  11:07 AM CDT  To: Alirio CARLISLE Staff  Subject: Need new order due to exp date                   Good morning, the above mentioned patient has an order for an echo with an exp date of 7/4/24. Next available is 2nd week of August. In order to schedule patient we will need a new order with extended exp date. Once received will contact patient to schedule. Thanks for you assistance in getting this patient scheduled.    Thank you,    Aida Gonzales  Patient Access

## 2024-06-26 ENCOUNTER — HOSPITAL ENCOUNTER (EMERGENCY)
Facility: HOSPITAL | Age: 69
Discharge: HOME | End: 2024-06-26
Attending: INTERNAL MEDICINE
Payer: MEDICARE

## 2024-06-26 VITALS
TEMPERATURE: 97 F | SYSTOLIC BLOOD PRESSURE: 141 MMHG | HEART RATE: 85 BPM | DIASTOLIC BLOOD PRESSURE: 91 MMHG | HEIGHT: 67 IN | WEIGHT: 111.75 LBS | BODY MASS INDEX: 17.54 KG/M2 | RESPIRATION RATE: 18 BRPM | OXYGEN SATURATION: 100 %

## 2024-06-26 DIAGNOSIS — G62.9 PERIPHERAL POLYNEUROPATHY: Primary | ICD-10-CM

## 2024-06-26 DIAGNOSIS — R42 DIZZINESS: ICD-10-CM

## 2024-06-26 LAB
ALBUMIN SERPL-MCNC: 4 G/DL (ref 3.4–4.8)
ALBUMIN/GLOB SERPL: 1.3 RATIO (ref 1.1–2)
ALP SERPL-CCNC: 54 UNIT/L (ref 40–150)
ALT SERPL-CCNC: 14 UNIT/L (ref 0–55)
ANION GAP SERPL CALC-SCNC: 10 MEQ/L
AST SERPL-CCNC: 19 UNIT/L (ref 5–34)
BILIRUB SERPL-MCNC: 1.3 MG/DL
BUN SERPL-MCNC: 23.2 MG/DL (ref 8.4–25.7)
CALCIUM SERPL-MCNC: 9.7 MG/DL (ref 8.8–10)
CHLORIDE SERPL-SCNC: 103 MMOL/L (ref 98–107)
CO2 SERPL-SCNC: 25 MMOL/L (ref 23–31)
CREAT SERPL-MCNC: 0.97 MG/DL (ref 0.73–1.18)
CREAT/UREA NIT SERPL: 24
GFR SERPLBLD CREATININE-BSD FMLA CKD-EPI: >60 ML/MIN/1.73/M2
GLOBULIN SER-MCNC: 3.1 GM/DL (ref 2.4–3.5)
GLUCOSE SERPL-MCNC: 129 MG/DL (ref 82–115)
HOLD SPECIMEN: NORMAL
POTASSIUM SERPL-SCNC: 4 MMOL/L (ref 3.5–5.1)
PROT SERPL-MCNC: 7.1 GM/DL (ref 5.8–7.6)
SODIUM SERPL-SCNC: 138 MMOL/L (ref 136–145)

## 2024-06-26 PROCEDURE — 80053 COMPREHEN METABOLIC PANEL: CPT | Performed by: INTERNAL MEDICINE

## 2024-06-26 PROCEDURE — 93005 ELECTROCARDIOGRAM TRACING: CPT

## 2024-06-26 PROCEDURE — 99285 EMERGENCY DEPT VISIT HI MDM: CPT | Mod: 25

## 2024-06-26 RX ORDER — GABAPENTIN 100 MG/1
100 CAPSULE ORAL 3 TIMES DAILY
Qty: 90 CAPSULE | Refills: 2 | Status: SHIPPED | OUTPATIENT
Start: 2024-06-26 | End: 2024-06-26

## 2024-06-26 RX ORDER — GABAPENTIN 100 MG/1
100 CAPSULE ORAL 3 TIMES DAILY
Qty: 90 CAPSULE | Refills: 2 | Status: SHIPPED | OUTPATIENT
Start: 2024-06-26 | End: 2025-06-26

## 2024-06-26 NOTE — ED PROVIDER NOTES
Encounter Date: 6/26/2024       History     Chief Complaint   Patient presents with    Dizziness     Lightheaded, sob, dizziness for a couple weeks. Has fallen 3 times in the past weeks with a syncopal episode as well. Denies chest pain.      Presents with lower extremities tingling, unable to sleep due to leg pain and falling frequently. States discussed with his PCP and have an appointment for a Holter monitor next week. Hx of DM, HTN    The history is provided by the patient and a relative.     Review of patient's allergies indicates:  No Known Allergies  Past Medical History:   Diagnosis Date    Chronic low back pain with left-sided sciatica     Diabetes mellitus     HTN (hypertension)      Past Surgical History:   Procedure Laterality Date    COLONOSCOPY N/A 4/10/2024    Procedure: COLON;  Surgeon: Ravi Petit MD;  Location: [facility] ENDOSCOPY;  Service: Gastroenterology;  Laterality: N/A;    EGD, WITH CLOSED BIOPSY  4/9/2024    Procedure: EGD, WITH CLOSED BIOPSY;  Surgeon: Ravi Petit MD;  Location: [facility] ENDOSCOPY;  Service: Gastroenterology;;    ESOPHAGOGASTRODUODENOSCOPY N/A 4/9/2024    Procedure: EGD;  Surgeon: Ravi Petit MD;  Location: [facility] ENDOSCOPY;  Service: Gastroenterology;  Laterality: N/A;    HIP SURGERY Left     LAPAROSCOPIC CHOLECYSTECTOMY N/A 4/22/2024    Procedure: CHOLECYSTECTOMY, LAPAROSCOPIC;  Surgeon: Demetrice Gardner MD;  Location: [facility];  Service: General;  Laterality: N/A;    ORIF HIP FRACTURE Right     >10 years     Family History   Problem Relation Name Age of Onset    No Known Problems Mother      Diabetes Father       Social History     Tobacco Use    Smoking status: Every Day     Current packs/day: 0.50     Average packs/day: 0.5 packs/day for 52.5 years (26.2 ttl pk-yrs)     Types: Cigarettes     Start date: 1/1/1972    Smokeless tobacco: Never   Substance Use Topics    Alcohol use: Not Currently     Comment: occ    Drug use: Never     Review of  Systems   Musculoskeletal:  Positive for back pain.   Neurological:  Positive for numbness.   Psychiatric/Behavioral:  Positive for sleep disturbance.        Physical Exam     Initial Vitals [06/26/24 1511]   BP Pulse Resp Temp SpO2   112/75 95 20 97.3 °F (36.3 °C) 100 %      MAP       --         Physical Exam    Nursing note and vitals reviewed.  Constitutional: He appears well-developed. No distress.   HENT:   Head: Normocephalic and atraumatic.   Eyes: Conjunctivae and EOM are normal. Pupils are equal, round, and reactive to light.   Neck: Neck supple.   Normal range of motion.  Cardiovascular:  Normal rate, regular rhythm, normal heart sounds and intact distal pulses.           Pulmonary/Chest: Breath sounds normal. No respiratory distress.   Abdominal: Abdomen is soft. Bowel sounds are normal. He exhibits no distension. There is no abdominal tenderness. There is no rebound and no guarding.   Musculoskeletal:         General: No edema. Normal range of motion.      Cervical back: Normal range of motion and neck supple.     Neurological: He is alert and oriented to person, place, and time. He has normal strength. No cranial nerve deficit. GCS score is 15. GCS eye subscore is 4. GCS verbal subscore is 5. GCS motor subscore is 6.   Skin: Skin is warm and dry. No rash noted.   Psychiatric: His behavior is normal.         ED Course   Procedures  Labs Reviewed   COMPREHENSIVE METABOLIC PANEL - Abnormal; Notable for the following components:       Result Value    Glucose 129 (*)     All other components within normal limits   EXTRA TUBES    Narrative:     The following orders were created for panel order EXTRA TUBES.  Procedure                               Abnormality         Status                     ---------                               -----------         ------                     Light Blue Top Hold[[id number]]                             Final result               Red Top Hold[[id number]]                                     Final result               Light Green Top Hold[[id number]]                            Final result               Lavender Top Hold[[id number]]                               Final result               Lavender Top Hold[[id number]]                               Final result                 Please view results for these tests on the individual orders.   LIGHT BLUE TOP HOLD   RED TOP HOLD   LIGHT GREEN TOP HOLD   LAVENDER TOP HOLD   LAVENDER TOP HOLD     EKG Readings: (Independently Interpreted)   Initial Reading: No STEMI. Rhythm: Normal Sinus Rhythm. Heart Rate: 92. Ectopy: No Ectopy. Conduction: Normal. ST Segments: Normal ST Segments. T Waves: Normal. Clinical Impression: Normal Sinus Rhythm     ECG Results              EKG 12-lead (In process)        Collection Time Result Time QRS Duration OHS QTC Calculation    06/26/24 15:22:14 06/26/24 15:25:40 78 447                     In process by Interface, Lab In [facility] (06/26/24 15:25:49)                   Narrative:    Test Reason : R42,    Vent. Rate : 093 BPM     Atrial Rate : 093 BPM     P-R Int : 110 ms          QRS Dur : 078 ms      QT Int : 360 ms       P-R-T Axes : 081 081 072 degrees     QTc Int : 447 ms    Sinus rhythm with short DC  Right atrial enlargement  Borderline Abnormal ECG  When compared with ECG of 25-MAY-2024 22:33,  No significant change was found    Referred By:             Confirmed By:                                   Imaging Results              CT Head Without Contrast (Final result)  Result time 06/26/24 18:14:55      Final result by Herson Brewer MD (06/26/24 18:14:55)                   Impression:      No acute abnormality seen      Electronically signed by: Honorio Brewer  Date:    06/26/2024  Time:    18:14               Narrative:    EXAMINATION:  CT HEAD WITHOUT CONTRAST    CLINICAL HISTORY:  Dizziness, persistent/recurrent, cardiac or vascular cause suspected;    TECHNIQUE:  Multiple axial images were obtained  from the base of the brain to the vertex without contrast administration.  Sagittal and coronal reconstructions were performed. .Automatic exposure control  (AEC) is utilized to reduce patient radiation exposure.    COMPARISON:  None    FINDINGS:  There is no intracranial mass or lesion seen.  No hemorrhage is seen.  No infarct is seen.  The ventricles and basilar cisterns appear normal.  Brain parenchyma appears grossly unremarkable.    Posterior fossa appears normal.  The calvarium is intact.  The paranasal sinuses appear grossly unremarkable.                                       Medications - No data to display  Medical Decision Making  Amount and/or Complexity of Data Reviewed  Labs: ordered. Decision-making details documented in ED Course.  Radiology: ordered and independent interpretation performed. Decision-making details documented in ED Course.  ECG/medicine tests: ordered and independent interpretation performed. Decision-making details documented in ED Course.    Risk  Prescription drug management.      Additional MDM:   Differential Diagnosis:   Peripheral vertigo, Central Vertigo, Cerumen impaction, orthostatic dizziness, Brain Mass, cardiac Dysrhythmias, among others                                      Clinical Impression:  Final diagnoses:  [R42] Dizziness  [G62.9] Peripheral polyneuropathy (Primary)          ED Disposition Condition    Discharge Stable          ED Prescriptions       Medication Sig Dispense Start Date End Date Auth. Provider    gabapentin (NEURONTIN) 100 MG capsule Take 1 capsule (100 mg total) by mouth 3 (three) times daily. 90 capsule 6/26/2024 6/26/2025 Tyler Hurt MD          Follow-up Information       Follow up With Specialties Details Why Contact Marilu Syed, P Family Medicine Schedule an appointment as soon as possible for a visit in 2 weeks  [address] [facility] [id number] [phone]      [facility] - Emergency Dept  Emergency Medicine  If symptoms worsen [address] [facility] [id number] [phone]             Tyler Hurt MD  06/26/24 1836       Tyler Hurt MD  06/26/24 1830

## 2024-06-27 LAB
OHS QRS DURATION: 78 MS
OHS QTC CALCULATION: 447 MS

## 2024-07-17 ENCOUNTER — HOSPITAL ENCOUNTER (OUTPATIENT)
Dept: RADIOLOGY | Facility: HOSPITAL | Age: 69
Discharge: HOME | End: 2024-07-17
Attending: INTERNAL MEDICINE
Payer: MEDICARE

## 2024-07-17 DIAGNOSIS — G62.9 PERIPHERAL POLYNEUROPATHY: ICD-10-CM

## 2024-07-17 PROCEDURE — 72148 MRI LUMBAR SPINE W/O DYE: CPT | Mod: TC

## 2024-07-21 ENCOUNTER — HOSPITAL ENCOUNTER (EMERGENCY)
Facility: HOSPITAL | Age: 69
Discharge: HOME | End: 2024-07-21
Attending: INTERNAL MEDICINE
Payer: MEDICARE

## 2024-07-21 VITALS
SYSTOLIC BLOOD PRESSURE: 162 MMHG | OXYGEN SATURATION: 100 % | TEMPERATURE: 97 F | DIASTOLIC BLOOD PRESSURE: 91 MMHG | HEART RATE: 75 BPM | RESPIRATION RATE: 18 BRPM

## 2024-07-21 DIAGNOSIS — M54.41 CHRONIC MIDLINE LOW BACK PAIN WITH BILATERAL SCIATICA: Primary | ICD-10-CM

## 2024-07-21 DIAGNOSIS — G89.29 CHRONIC MIDLINE LOW BACK PAIN WITH BILATERAL SCIATICA: Primary | ICD-10-CM

## 2024-07-21 DIAGNOSIS — M54.42 CHRONIC MIDLINE LOW BACK PAIN WITH BILATERAL SCIATICA: Primary | ICD-10-CM

## 2024-07-21 LAB
ALBUMIN SERPL-MCNC: 3.7 G/DL (ref 3.4–4.8)
ALBUMIN/GLOB SERPL: 1.3 RATIO (ref 1.1–2)
ALP SERPL-CCNC: 58 UNIT/L (ref 40–150)
ALT SERPL-CCNC: 24 UNIT/L (ref 0–55)
ANION GAP SERPL CALC-SCNC: 11 MEQ/L
AST SERPL-CCNC: 24 UNIT/L (ref 5–34)
BASOPHILS # BLD AUTO: 0.02 X10(3)/MCL
BASOPHILS NFR BLD AUTO: 0.4 %
BILIRUB SERPL-MCNC: 0.5 MG/DL
BNP BLD-MCNC: 23.5 PG/ML
BUN SERPL-MCNC: 22.7 MG/DL (ref 8.4–25.7)
CALCIUM SERPL-MCNC: 9.1 MG/DL (ref 8.8–10)
CHLORIDE SERPL-SCNC: 100 MMOL/L (ref 98–107)
CO2 SERPL-SCNC: 22 MMOL/L (ref 23–31)
CREAT SERPL-MCNC: 0.83 MG/DL (ref 0.73–1.18)
CREAT/UREA NIT SERPL: 27
D DIMER PPP IA.FEU-MCNC: 0.4 UG/ML FEU (ref 0–0.5)
EOSINOPHIL # BLD AUTO: 0.01 X10(3)/MCL (ref 0–0.9)
EOSINOPHIL NFR BLD AUTO: 0.2 %
ERYTHROCYTE [DISTWIDTH] IN BLOOD BY AUTOMATED COUNT: 12.8 % (ref 11.5–17)
GFR SERPLBLD CREATININE-BSD FMLA CKD-EPI: >60 ML/MIN/1.73/M2
GLOBULIN SER-MCNC: 2.9 GM/DL (ref 2.4–3.5)
GLUCOSE SERPL-MCNC: 86 MG/DL (ref 82–115)
HCT VFR BLD AUTO: 40.9 % (ref 42–52)
HGB BLD-MCNC: 14.6 G/DL (ref 14–18)
HOLD SPECIMEN: NORMAL
HOLD SPECIMEN: NORMAL
IMM GRANULOCYTES # BLD AUTO: 0.01 X10(3)/MCL (ref 0–0.04)
IMM GRANULOCYTES NFR BLD AUTO: 0.2 %
LYMPHOCYTES # BLD AUTO: 3.16 X10(3)/MCL (ref 0.6–4.6)
LYMPHOCYTES NFR BLD AUTO: 56.7 %
MCH RBC QN AUTO: 31.6 PG (ref 27–31)
MCHC RBC AUTO-ENTMCNC: 35.7 G/DL (ref 33–36)
MCV RBC AUTO: 88.5 FL (ref 80–94)
MONOCYTES # BLD AUTO: 0.34 X10(3)/MCL (ref 0.1–1.3)
MONOCYTES NFR BLD AUTO: 6.1 %
NEUTROPHILS # BLD AUTO: 2.03 X10(3)/MCL (ref 2.1–9.2)
NEUTROPHILS NFR BLD AUTO: 36.4 %
NRBC BLD AUTO-RTO: 0 %
PLATELET # BLD AUTO: 229 X10(3)/MCL (ref 130–400)
PMV BLD AUTO: 9.5 FL (ref 7.4–10.4)
POTASSIUM SERPL-SCNC: 3.6 MMOL/L (ref 3.5–5.1)
PROT SERPL-MCNC: 6.6 GM/DL (ref 5.8–7.6)
RBC # BLD AUTO: 4.62 X10(6)/MCL (ref 4.7–6.1)
SODIUM SERPL-SCNC: 133 MMOL/L (ref 136–145)
TROPONIN I SERPL-MCNC: <0.01 NG/ML (ref 0–0.04)
WBC # BLD AUTO: 5.57 X10(3)/MCL (ref 4.5–11.5)

## 2024-07-21 PROCEDURE — 25000003 PHARM REV CODE 250: Performed by: PHYSICIAN ASSISTANT

## 2024-07-21 PROCEDURE — 85379 FIBRIN DEGRADATION QUANT: CPT | Performed by: PHYSICIAN ASSISTANT

## 2024-07-21 PROCEDURE — 93005 ELECTROCARDIOGRAM TRACING: CPT

## 2024-07-21 PROCEDURE — 83880 ASSAY OF NATRIURETIC PEPTIDE: CPT | Performed by: PHYSICIAN ASSISTANT

## 2024-07-21 PROCEDURE — 85025 COMPLETE CBC W/AUTO DIFF WBC: CPT | Performed by: PHYSICIAN ASSISTANT

## 2024-07-21 PROCEDURE — 84484 ASSAY OF TROPONIN QUANT: CPT | Performed by: PHYSICIAN ASSISTANT

## 2024-07-21 PROCEDURE — 99285 EMERGENCY DEPT VISIT HI MDM: CPT | Mod: 25

## 2024-07-21 PROCEDURE — 80053 COMPREHEN METABOLIC PANEL: CPT | Performed by: PHYSICIAN ASSISTANT

## 2024-07-21 RX ORDER — METHOCARBAMOL 500 MG/1
1000 TABLET, FILM COATED ORAL 3 TIMES DAILY
Qty: 30 TABLET | Refills: 0 | Status: SHIPPED | OUTPATIENT
Start: 2024-07-21 | End: 2024-07-26

## 2024-07-21 RX ORDER — HYDROCODONE BITARTRATE AND ACETAMINOPHEN 5; 325 MG/1; MG/1
1 TABLET ORAL
Status: COMPLETED | OUTPATIENT
Start: 2024-07-21 | End: 2024-07-21

## 2024-07-21 RX ORDER — HYDROCODONE BITARTRATE AND ACETAMINOPHEN 5; 325 MG/1; MG/1
1 TABLET ORAL EVERY 6 HOURS PRN
Qty: 12 TABLET | Refills: 0 | Status: SHIPPED | OUTPATIENT
Start: 2024-07-21

## 2024-07-21 RX ORDER — METHOCARBAMOL 500 MG/1
500 TABLET, FILM COATED ORAL
Status: COMPLETED | OUTPATIENT
Start: 2024-07-21 | End: 2024-07-21

## 2024-07-21 RX ADMIN — HYDROCODONE BITARTRATE AND ACETAMINOPHEN 1 TABLET: 5; 325 TABLET ORAL at 05:07

## 2024-07-21 RX ADMIN — METHOCARBAMOL TABLETS 500 MG: 500 TABLET, COATED ORAL at 08:07

## 2024-07-21 NOTE — ED PROVIDER NOTES
"Encounter Date: 7/21/2024       History     Chief Complaint   Patient presents with    Shortness of Breath    Dizziness    Back Pain     C/o dizziness, sob, lower back pain since yesterday     67 yo male with PMHx of DM, HTN, and chronic low back pain presents to ED with worsening low back pain that radiates down his legs for the past 2 months. Patient states his back has hurt since he had his gallbladder removed a few months ago. Patient describes the pain as pins and needles and intermittent "shocks". Patient denies bladder or bowel incontinence, CP, fever, chills. Patient states pain is worse at rest. Patient admits recent weight loss, dizziness, fatigue, and SOB. Patient states he had an MRI of his back 5 days ago. MRI showed mild disc herniations of lumbar spine. Patient was prescribed diclofenac for pain previously but states it has not helped.     The history is provided by the patient. No  was used.     Review of patient's allergies indicates:  No Known Allergies  Past Medical History:   Diagnosis Date    Chronic low back pain with left-sided sciatica     Diabetes mellitus     HTN (hypertension)      Past Surgical History:   Procedure Laterality Date    COLONOSCOPY N/A 4/10/2024    Procedure: COLON;  Surgeon: Ravi Petit MD;  Location: [facility] ENDOSCOPY;  Service: Gastroenterology;  Laterality: N/A;    EGD, WITH CLOSED BIOPSY  4/9/2024    Procedure: EGD, WITH CLOSED BIOPSY;  Surgeon: Ravi Petit MD;  Location: [facility] ENDOSCOPY;  Service: Gastroenterology;;    ESOPHAGOGASTRODUODENOSCOPY N/A 4/9/2024    Procedure: EGD;  Surgeon: Ravi Petit MD;  Location: [facility] ENDOSCOPY;  Service: Gastroenterology;  Laterality: N/A;    HIP SURGERY Left     LAPAROSCOPIC CHOLECYSTECTOMY N/A 4/22/2024    Procedure: CHOLECYSTECTOMY, LAPAROSCOPIC;  Surgeon: Demetrice Gardner MD;  Location: [facility] OR;  Service: General;  Laterality: N/A;    ORIF HIP FRACTURE Right     >10 years     Family " History   Problem Relation Name Age of Onset    No Known Problems Mother      Diabetes Father       Social History     Tobacco Use    Smoking status: Every Day     Current packs/day: 0.50     Average packs/day: 0.5 packs/day for 52.6 years (26.3 ttl pk-yrs)     Types: Cigarettes     Start date: 1/1/1972    Smokeless tobacco: Never   Substance Use Topics    Alcohol use: Not Currently     Comment: occ    Drug use: Never     Review of Systems   Constitutional:  Positive for fatigue. Negative for chills, diaphoresis and fever.   HENT:  Negative for sore throat.    Respiratory:  Positive for shortness of breath. Negative for cough, chest tightness and wheezing.    Cardiovascular:  Negative for chest pain, palpitations and leg swelling.   Gastrointestinal:  Negative for nausea and vomiting.   Genitourinary:  Negative for dysuria and flank pain.   Musculoskeletal:  Positive for arthralgias and back pain. Negative for joint swelling, myalgias, neck pain and neck stiffness.   Skin:  Negative for rash.   Neurological:  Positive for dizziness, light-headedness and numbness. Negative for seizures, syncope, facial asymmetry, speech difficulty, weakness and headaches.   Hematological:  Does not bruise/bleed easily.   All other systems reviewed and are negative.      Physical Exam     Initial Vitals   BP Pulse Resp Temp SpO2   07/21/24 1707 07/21/24 1707 07/21/24 1707 07/21/24 1707 07/21/24 1800   102/65 88 20 97.3 °F (36.3 °C) 99 %      MAP       --                Physical Exam    Nursing note and vitals reviewed.  Constitutional: He appears well-developed and well-nourished.   HENT:   Head: Normocephalic and atraumatic.   Right Ear: External ear normal.   Left Ear: External ear normal.   Nose: Nose normal.   Eyes: Conjunctivae and EOM are normal.   Neck: Neck supple. No thyromegaly present. No tracheal deviation present.   Cardiovascular:  Normal rate, regular rhythm and normal heart sounds.     Exam reveals no gallop and no  friction rub.       No murmur heard.  Pulmonary/Chest: Breath sounds normal. No respiratory distress. He has no wheezes. He has no rhonchi. He has no rales. He exhibits no tenderness.   Abdominal: Abdomen is soft. He exhibits no distension.   Musculoskeletal:         General: No edema.      Cervical back: Neck supple.      Lumbar back: Spasms and tenderness present. No swelling, edema, deformity or signs of trauma. Normal range of motion. Positive right straight leg raise test and positive left straight leg raise test.        Back:       Comments: AROM elicits pain.     Neurological: He is alert and oriented to person, place, and time. GCS score is 15. GCS eye subscore is 4. GCS verbal subscore is 5. GCS motor subscore is 6.   Skin: Skin is warm. Capillary refill takes less than 2 seconds. No rash and no abscess noted. No erythema. No pallor.   Psychiatric: He has a normal mood and affect.         ED Course   Procedures  Labs Reviewed   COMPREHENSIVE METABOLIC PANEL - Abnormal       Result Value    Sodium 133 (*)     Potassium 3.6      Chloride 100      CO2 22 (*)     Glucose 86      Blood Urea Nitrogen 22.7      Creatinine 0.83      Calcium 9.1      Protein Total 6.6      Albumin 3.7      Globulin 2.9      Albumin/Globulin Ratio 1.3      Bilirubin Total 0.5      ALP 58      ALT 24      AST 24      eGFR >60      Anion Gap 11.0      BUN/Creatinine Ratio 27     CBC WITH DIFFERENTIAL - Abnormal    WBC 5.57      RBC 4.62 (*)     Hgb 14.6      Hct 40.9 (*)     MCV 88.5      MCH 31.6 (*)     MCHC 35.7      RDW 12.8      Platelet 229      MPV 9.5      Neut % 36.4      Lymph % 56.7      Mono % 6.1      Eos % 0.2      Basophil % 0.4      Lymph # 3.16      Neut # 2.03 (*)     Mono # 0.34      Eos # 0.01      Baso # 0.02      IG# 0.01      IG% 0.2      NRBC% 0.0     TROPONIN I - Normal    Troponin-I <0.010     D DIMER, QUANTITATIVE - Normal    D-Dimer 0.40     B-TYPE NATRIURETIC PEPTIDE - Normal    Natriuretic Peptide 23.5      CBC W/ AUTO DIFFERENTIAL    Narrative:     The following orders were created for panel order CBC auto differential.  Procedure                               Abnormality         Status                     ---------                               -----------         ------                     CBC with Differential[[id number]]       Abnormal            Final result                 Please view results for these tests on the individual orders.   EXTRA TUBES    Narrative:     The following orders were created for panel order EXTRA TUBES.  Procedure                               Abnormality         Status                     ---------                               -----------         ------                     Gold Top Hold[[id number]]                                   Final result               Pink Top Hold[[id number]]                                   Final result                 Please view results for these tests on the individual orders.   GOLD TOP HOLD    Extra Tube Hold for add-ons.     PINK TOP HOLD    Extra Tube Hold for add-ons.       EKG Readings: (Independently Interpreted)   Initial Reading: No STEMI. Rhythm: Normal Sinus Rhythm. Heart Rate: 72. Ectopy: No Ectopy. Conduction: Normal. ST Segments: Normal ST Segments. T Waves: Normal. Clinical Impression: Normal Sinus Rhythm     ECG Results              EKG 12-lead (In process)        Collection Time Result Time QRS Duration OHS QTC Calculation    07/21/24 18:18:12 07/21/24 18:21:58 92 435                     In process by Interface, Lab In [facility] (07/21/24 18:22:04)                   Narrative:    Test Reason : R42,    Vent. Rate : 072 BPM     Atrial Rate : 072 BPM     P-R Int : 116 ms          QRS Dur : 092 ms      QT Int : 398 ms       P-R-T Axes : 087 088 077 degrees     QTc Int : 435 ms    Normal sinus rhythm  Normal ECG  When compared with ECG of 26-JUN-2024 15:22,  ST elevation now present in Inferior leads    Referred By: [email or web address]   SELF            "Confirmed By:                                   Imaging Results              X-Ray Chest PA And Lateral (Final result)  Result time 07/21/24 19:18:32      Final result by Richard Patricio MD (07/21/24 19:18:32)                   Impression:      No acute disease is seen      Electronically signed by: Richard Patricio MD  Date:    07/21/2024  Time:    19:18               Narrative:    EXAMINATION:  XR CHEST PA AND LATERAL    CLINICAL HISTORY:  Dizziness and giddiness    TECHNIQUE:  PA and lateral views of the chest were performed.    COMPARISON:  06/17/2024    FINDINGS:  No infiltrates are seen.  Heart size is within normal limits.  Costophrenic angles are clear.  There is vascular calcification noted.                                       Medications   HYDROcodone-acetaminophen 5-325 mg per tablet 1 tablet (1 tablet Oral Given 7/21/24 1757)   methocarbamoL tablet 500 mg (500 mg Oral Given 7/21/24 2004)     Medical Decision Making  67 yo male with PMHx of DM, HTN, and chronic low back pain presents to ED with worsening low back pain that radiates down his legs for the past 2 months. Patient states his back has hurt since he had his gallbladder removed a few months ago. Patient describes the pain as pins and needles and intermittent "shocks". Patient denies bladder or bowel incontinence, CP, fever, chills. Patient states pain is worse at rest. Patient admits recent weight loss, dizziness, fatigue, and SOB. Patient states he had an MRI of his back 5 days ago. MRI showed mild disc herniations of lumbar spine. Patient was prescribed diclofenac for pain previously but states it has not helped.     DDX: sciatica, spinal stenosis, bone cancer, pneumonia, PE, heart failure, aortic dissection, among others.    Hospital course: CXR, labs, EKG ordered. [place] given in ED for pain. CBC, CMP, Troponin, d dimer, and BNP all wnl. CXR wnl. Low back pain improved with Norco. Will DC home with short course of Norco and Robaxin. Instructed " him to follow up with PCP followign his MRI. Strict return precautions given. Stable for discharge.       Amount and/or Complexity of Data Reviewed  Labs: ordered.  Radiology: ordered.    Risk  Prescription drug management.                                  Zohra ENRIQUE PA-S, helped prepare the medical record for my supervising clinician, Stefanie Bliss PA-C.     Stefanie ENRIQUE PA-C, was physically present during the student's evaluation of the patient. I personally performed or reperformed the physical exam and medical-decision making activities. I have reviewed and agree with the student's documentation and/or findings including history, review of systems, physical exam, and medical-decision making. Part of the encounter was completed by the PA student under my direct supervision. The information documented is accurate and correct.       Clinical Impression:  Final diagnoses:  [M54.41, M54.42, G89.29] Chronic midline low back pain with bilateral sciatica (Primary)          ED Disposition Condition    Discharge Stable          ED Prescriptions       Medication Sig Dispense Start Date End Date Auth. Provider    HYDROcodone-acetaminophen (NORCO) 5-325 mg per tablet Take 1 tablet by mouth every 6 (six) hours as needed for Pain. 12 tablet 7/21/2024 -- Stefanie Bliss PA-C    methocarbamoL (ROBAXIN) 500 MG Tab Take 2 tablets (1,000 mg total) by mouth 3 (three) times daily. for 5 days 30 tablet 7/21/2024 7/26/2024 Stefanie Bliss PA-C          Follow-up Information       Follow up With Specialties Details Why Contact Info    Marilu Amezquita, RAY Family Medicine   [address] [facility] [address] [phone]      [facility]  In 1 week  [address] [facility] [address]    [facility] Emergency Medicine In 3 days As needed, If symptoms worsen [address] [facility] [address] [facility] [address] [phone]             Izaiah  Stefanie Wade PA-C  07/21/24 1950       Stefanie Bliss PA-C  07/21/24 2046       Stefanie Bliss PA-C  07/21/24 2047

## 2024-07-22 LAB
OHS QRS DURATION: 92 MS
OHS QTC CALCULATION: 435 MS

## 2024-07-27 ENCOUNTER — HOSPITAL ENCOUNTER (EMERGENCY)
Facility: HOSPITAL | Age: 69
Discharge: HOME | End: 2024-07-27
Attending: EMERGENCY MEDICINE
Payer: MEDICARE

## 2024-07-27 VITALS
SYSTOLIC BLOOD PRESSURE: 143 MMHG | RESPIRATION RATE: 19 BRPM | HEART RATE: 89 BPM | WEIGHT: 120 LBS | OXYGEN SATURATION: 98 % | BODY MASS INDEX: 18.83 KG/M2 | HEIGHT: 67 IN | TEMPERATURE: 98 F | DIASTOLIC BLOOD PRESSURE: 89 MMHG

## 2024-07-27 DIAGNOSIS — M54.16 LUMBAR RADICULOPATHY: Primary | ICD-10-CM

## 2024-07-27 PROCEDURE — 96372 THER/PROPH/DIAG INJ SC/IM: CPT

## 2024-07-27 PROCEDURE — 63600175 PHARM REV CODE 636 W HCPCS

## 2024-07-27 PROCEDURE — 99284 EMERGENCY DEPT VISIT MOD MDM: CPT | Mod: 25

## 2024-07-27 PROCEDURE — 25000003 PHARM REV CODE 250

## 2024-07-27 RX ORDER — KETOROLAC TROMETHAMINE 30 MG/ML
15 INJECTION, SOLUTION INTRAMUSCULAR; INTRAVENOUS
Status: COMPLETED | OUTPATIENT
Start: 2024-07-27 | End: 2024-07-27

## 2024-07-27 RX ORDER — OXYCODONE AND ACETAMINOPHEN 5; 325 MG/1; MG/1
1 TABLET ORAL EVERY 6 HOURS PRN
Qty: 12 TABLET | Refills: 0 | Status: SHIPPED | OUTPATIENT
Start: 2024-07-27

## 2024-07-27 RX ORDER — TIZANIDINE 4 MG/1
4 TABLET ORAL EVERY 6 HOURS PRN
Qty: 30 TABLET | Refills: 0 | Status: SHIPPED | OUTPATIENT
Start: 2024-07-27 | End: 2024-08-06

## 2024-07-27 RX ORDER — KETOROLAC TROMETHAMINE 10 MG/1
10 TABLET, FILM COATED ORAL EVERY 6 HOURS
Qty: 20 TABLET | Refills: 0 | Status: SHIPPED | OUTPATIENT
Start: 2024-07-27 | End: 2024-08-01

## 2024-07-27 RX ORDER — GABAPENTIN 300 MG/1
300 CAPSULE ORAL 3 TIMES DAILY
Qty: 42 CAPSULE | Refills: 0 | Status: SHIPPED | OUTPATIENT
Start: 2024-07-27 | End: 2024-08-10

## 2024-07-27 RX ORDER — OXYCODONE AND ACETAMINOPHEN 10; 325 MG/1; MG/1
1 TABLET ORAL
Status: COMPLETED | OUTPATIENT
Start: 2024-07-27 | End: 2024-07-27

## 2024-07-27 RX ADMIN — OXYCODONE HYDROCHLORIDE AND ACETAMINOPHEN 1 TABLET: 10; 325 TABLET ORAL at 05:07

## 2024-07-27 RX ADMIN — KETOROLAC TROMETHAMINE 15 MG: 30 INJECTION, SOLUTION INTRAMUSCULAR at 05:07

## 2024-07-27 NOTE — FIRST PROVIDER EVALUATION
"Medical screening examination initiated.  I have conducted a focused provider triage encounter, findings are as follows:    Brief history of present illness:  arrived to ED due to atraumatic lower back pain that radiates down his legs. Hx of chronic back pain with sciatica. Denies injury/trauma. No incontinence, saddle anesthesia, or additional red flags. Recently seen at [facility] for same complaints. Prescribed Norco and Robaxin.     Vitals:    07/27/24 1704   BP: (!) 179/104   BP Location: Left arm   Pulse: 88   Resp: 18   Temp: 97.2 °F (36.2 °C)   TempSrc: Oral   SpO2: 98%   Weight: 54.4 kg (120 lb)   Height: 5' 7" (1.702 m)       Pertinent physical exam:  awake, alert, has non-labored breathing, ambulatory into triage    Brief workup plan:  medication     Preliminary workup initiated; this workup will be continued and followed by the physician or advanced practice provider that is assigned to the patient when roomed.  "

## 2024-07-27 NOTE — DISCHARGE INSTRUCTIONS
For pain, take Percocet every 6 hours as needed.  Do not take with previously prescribed hydrocodone. Okay to rotate with Toradol.    Additionally, you may take tizanidine every 6 hours as needed for muscle pain and spasms.  Do not take this was previously prescribed methocarbamol.      Take gabapentin 3 times daily.  Do not take with previously prescribed gabapentin dose.  Recommend follow up with primary care for continued management of this medication in your chronic lower back pain.      Return to ER for worsening symptoms.

## 2024-07-27 NOTE — ED PROVIDER NOTES
Encounter Date: 7/27/2024       History     Chief Complaint   Patient presents with    Hip Pain     Bilateral hip pain x 2 days    Back Pain     See MDM for details     The history is provided by the patient.     Review of patient's allergies indicates:  No Known Allergies  Past Medical History:   Diagnosis Date    Chronic low back pain with left-sided sciatica     Diabetes mellitus     HTN (hypertension)      Past Surgical History:   Procedure Laterality Date    COLONOSCOPY N/A 4/10/2024    Procedure: COLON;  Surgeon: Ravi Petit MD;  Location: [facility] ENDOSCOPY;  Service: Gastroenterology;  Laterality: N/A;    EGD, WITH CLOSED BIOPSY  4/9/2024    Procedure: EGD, WITH CLOSED BIOPSY;  Surgeon: Ravi Petit MD;  Location: [facility] ENDOSCOPY;  Service: Gastroenterology;;    ESOPHAGOGASTRODUODENOSCOPY N/A 4/9/2024    Procedure: EGD;  Surgeon: Ravi Petit MD;  Location: [facility] ENDOSCOPY;  Service: Gastroenterology;  Laterality: N/A;    HIP SURGERY Left     LAPAROSCOPIC CHOLECYSTECTOMY N/A 4/22/2024    Procedure: CHOLECYSTECTOMY, LAPAROSCOPIC;  Surgeon: Demetrice Gardner MD;  Location: [facility];  Service: General;  Laterality: N/A;    ORIF HIP FRACTURE Right     >10 years     Family History   Problem Relation Name Age of Onset    No Known Problems Mother      Diabetes Father       Social History     Tobacco Use    Smoking status: Every Day     Current packs/day: 0.50     Average packs/day: 0.5 packs/day for 52.6 years (26.3 ttl pk-yrs)     Types: Cigarettes     Start date: 1/1/1972    Smokeless tobacco: Never   Substance Use Topics    Alcohol use: Not Currently     Comment: occ    Drug use: Never     Review of Systems   Constitutional:  Negative for chills and fever.   Respiratory:  Negative for shortness of breath.    Cardiovascular:  Negative for chest pain.   Musculoskeletal:  Positive for arthralgias and back pain. Negative for gait problem.   Neurological:  Negative for weakness and numbness.    All other systems reviewed and are negative.      Physical Exam     Initial Vitals [07/27/24 1704]   BP Pulse Resp Temp SpO2   (!) 179/104 88 18 97.2 °F (36.2 °C) 98 %      MAP       --         Physical Exam    Nursing note and vitals reviewed.  Constitutional: He appears well-developed and well-nourished. No distress.   Uncomfortable appearing   HENT:   Head: Normocephalic and atraumatic.   Eyes: Conjunctivae and EOM are normal.   Neck:   Normal range of motion.  Cardiovascular:  Normal rate.           Pulmonary/Chest: No respiratory distress.   Abdominal: Abdomen is soft.   Musculoskeletal:      Cervical back: Normal range of motion.      Comments: Lumbar spine: No bony tenderness.  Positive paraspinal muscle tenderness bilaterally with palpable muscle spasms present.  Negative straight leg raise bilaterally.  5/5 motor strength in bilateral lower extremities.     Neurological: He is alert and oriented to person, place, and time. He has normal strength.   Motor and sensation intact and equal in bilateral lower extremities.   Skin: Skin is warm and dry.   Psychiatric: He has a normal mood and affect. Thought content normal.         ED Course   Procedures  Labs Reviewed - No data to display       Imaging Results    None          Medications   oxyCODONE-acetaminophen  mg per tablet 1 tablet (has no administration in time range)   ketorolac injection 15 mg (15 mg Intramuscular Given 7/27/24 1714)     Medical Decision Making  68-year-old male with past medical history of chronic lower back pain presents to the ER for evaluation of worsening back pain with radiation of bilateral lower extremities x2 days.  He denies any new recent injury or trauma.  He localizes his pain to the bilateral lower back with radiation to the bilateral lower extremities.  He describes the pain as constant and varying in character and consistency.  He reports intermittent sharp, shooting pains into his lower extremities.  He denies any  numbness.  He denies any bladder or bowel incontinence.  He reports that he has been ambulatory with a walking cane.  He reports that his primary care did order an MRI which revealed a disc problem causing some spinal stenosis.  He reports that he has not had a follow up with his primary care since and he is unsure of what the plan will be from here.  This MRI was done in early July.    The patient has not had any new recent injury or trauma and therefore I do not believe that repeat imaging is warranted today.  He was recently seen in the ER and was given Robaxin and Norco.  He reports he has been taking this medication with little relief.  On physical exam, the patient has no red flag for repeat imaging today.  Discussed his MRI findings with the patient recommend knees primary care provider for a pain management versus neurosurgery plan.  Patient verbalizes understanding of this.  At this time, we will try to control his acute pain with medications.  At this time, we will change his previously prescribed Robaxin to tizanidine.  Additionally, we will discharge home with Percocet.  He was given IM Toradol here.  We will send with an oral course of Toradol as well.  The patient was previously taking gabapentin 100 mg dose, however, he shares he has not been taking his medication recently.  We will refill his gabapentin at this time.  Patient was in agreement treatment plan will be discharged home.    Risk  Prescription drug management.      Additional MDM:   Differential Diagnosis:   Other: The following diagnoses were also considered and will be evaluated: injury, cauda equina and Malingering.                                   Clinical Impression:  Final diagnoses:  [M54.16] Lumbar radiculopathy (Primary)          ED Disposition Condition    Discharge Stable          ED Prescriptions       Medication Sig Dispense Start Date End Date Auth. Provider    ketorolac (TORADOL) 10 mg tablet Take 1 tablet (10 mg total) by  mouth every 6 (six) hours. for 5 days 20 tablet 7/27/2024 8/1/2024 Gayathri Lane PA    oxyCODONE-acetaminophen (PERCOCET) 5-325 mg per tablet Take 1 tablet by mouth every 6 (six) hours as needed for Pain. 12 tablet 7/27/2024 -- Gayathri Lane PA    gabapentin (NEURONTIN) 300 MG capsule Take 1 capsule (300 mg total) by mouth 3 (three) times daily. for 14 days 42 capsule 7/27/2024 8/10/2024 Gayathri Lane PA    tiZANidine (ZANAFLEX) 4 MG tablet Take 1 tablet (4 mg total) by mouth every 6 (six) hours as needed (muscle pain). 30 tablet 7/27/2024 8/6/2024 Gayathri Lane PA          Follow-up Information       Follow up With Specialties Details Why Contact Info    Marilu Amezquita, FNP Family Medicine Call in 1 day for MRI follow up and management of lower back pain [address] [facility] [address]  [phone]               Gayathri Lane PA  07/27/24 4001

## 2024-08-19 ENCOUNTER — HOSPITAL ENCOUNTER (EMERGENCY)
Facility: HOSPITAL | Age: 69
Discharge: HOME | End: 2024-08-19
Attending: INTERNAL MEDICINE
Payer: MEDICARE

## 2024-08-19 VITALS
DIASTOLIC BLOOD PRESSURE: 93 MMHG | SYSTOLIC BLOOD PRESSURE: 164 MMHG | HEART RATE: 84 BPM | BODY MASS INDEX: 21.69 KG/M2 | OXYGEN SATURATION: 99 % | RESPIRATION RATE: 24 BRPM | WEIGHT: 135 LBS | TEMPERATURE: 98 F | HEIGHT: 66 IN

## 2024-08-19 DIAGNOSIS — G89.29 CHRONIC MIDLINE LOW BACK PAIN WITHOUT SCIATICA: Primary | ICD-10-CM

## 2024-08-19 DIAGNOSIS — M54.42 CHRONIC LEFT-SIDED LOW BACK PAIN WITH LEFT-SIDED SCIATICA: ICD-10-CM

## 2024-08-19 DIAGNOSIS — R42 LIGHT HEADEDNESS: ICD-10-CM

## 2024-08-19 DIAGNOSIS — R06.02 SHORTNESS OF BREATH: ICD-10-CM

## 2024-08-19 DIAGNOSIS — G89.29 CHRONIC LEFT-SIDED LOW BACK PAIN WITH LEFT-SIDED SCIATICA: ICD-10-CM

## 2024-08-19 DIAGNOSIS — M54.50 CHRONIC MIDLINE LOW BACK PAIN WITHOUT SCIATICA: Primary | ICD-10-CM

## 2024-08-19 LAB
ALBUMIN SERPL-MCNC: 4 G/DL (ref 3.4–4.8)
ALBUMIN/GLOB SERPL: 1.3 RATIO (ref 1.1–2)
ALP SERPL-CCNC: 61 UNIT/L (ref 40–150)
ALT SERPL-CCNC: 23 UNIT/L (ref 0–55)
ANION GAP SERPL CALC-SCNC: 8 MEQ/L
AST SERPL-CCNC: 22 UNIT/L (ref 5–34)
BASOPHILS # BLD AUTO: 0.04 X10(3)/MCL
BASOPHILS NFR BLD AUTO: 0.5 %
BILIRUB SERPL-MCNC: 0.6 MG/DL
BNP BLD-MCNC: 28.1 PG/ML
BUN SERPL-MCNC: 15.3 MG/DL (ref 8.4–25.7)
CALCIUM SERPL-MCNC: 10 MG/DL (ref 8.8–10)
CHLORIDE SERPL-SCNC: 103 MMOL/L (ref 98–107)
CO2 SERPL-SCNC: 26 MMOL/L (ref 23–31)
CREAT SERPL-MCNC: 0.84 MG/DL (ref 0.73–1.18)
CREAT/UREA NIT SERPL: 18
EOSINOPHIL # BLD AUTO: 0.03 X10(3)/MCL (ref 0–0.9)
EOSINOPHIL NFR BLD AUTO: 0.4 %
ERYTHROCYTE [DISTWIDTH] IN BLOOD BY AUTOMATED COUNT: 12.1 % (ref 11.5–17)
GFR SERPLBLD CREATININE-BSD FMLA CKD-EPI: >60 ML/MIN/1.73/M2
GLOBULIN SER-MCNC: 3.2 GM/DL (ref 2.4–3.5)
GLUCOSE SERPL-MCNC: 104 MG/DL (ref 82–115)
HCT VFR BLD AUTO: 43.6 % (ref 42–52)
HGB BLD-MCNC: 15.5 G/DL (ref 14–18)
HOLD SPECIMEN: NORMAL
IMM GRANULOCYTES # BLD AUTO: 0.01 X10(3)/MCL (ref 0–0.04)
IMM GRANULOCYTES NFR BLD AUTO: 0.1 %
LYMPHOCYTES # BLD AUTO: 4.21 X10(3)/MCL (ref 0.6–4.6)
LYMPHOCYTES NFR BLD AUTO: 50.2 %
MAGNESIUM SERPL-MCNC: 1.8 MG/DL (ref 1.6–2.6)
MCH RBC QN AUTO: 31.9 PG (ref 27–31)
MCHC RBC AUTO-ENTMCNC: 35.6 G/DL (ref 33–36)
MCV RBC AUTO: 89.7 FL (ref 80–94)
MONOCYTES # BLD AUTO: 0.58 X10(3)/MCL (ref 0.1–1.3)
MONOCYTES NFR BLD AUTO: 6.9 %
NEUTROPHILS # BLD AUTO: 3.51 X10(3)/MCL (ref 2.1–9.2)
NEUTROPHILS NFR BLD AUTO: 41.9 %
NRBC BLD AUTO-RTO: 0 %
PLATELET # BLD AUTO: 283 X10(3)/MCL (ref 130–400)
PMV BLD AUTO: 9.6 FL (ref 7.4–10.4)
POTASSIUM SERPL-SCNC: 3.8 MMOL/L (ref 3.5–5.1)
PROT SERPL-MCNC: 7.2 GM/DL (ref 5.8–7.6)
RBC # BLD AUTO: 4.86 X10(6)/MCL (ref 4.7–6.1)
SODIUM SERPL-SCNC: 137 MMOL/L (ref 136–145)
TSH SERPL-ACNC: 2.51 UIU/ML (ref 0.35–4.94)
WBC # BLD AUTO: 8.38 X10(3)/MCL (ref 4.5–11.5)

## 2024-08-19 PROCEDURE — 99284 EMERGENCY DEPT VISIT MOD MDM: CPT | Mod: 25

## 2024-08-19 PROCEDURE — 80053 COMPREHEN METABOLIC PANEL: CPT | Performed by: INTERNAL MEDICINE

## 2024-08-19 PROCEDURE — 83735 ASSAY OF MAGNESIUM: CPT | Performed by: INTERNAL MEDICINE

## 2024-08-19 PROCEDURE — 83880 ASSAY OF NATRIURETIC PEPTIDE: CPT | Performed by: INTERNAL MEDICINE

## 2024-08-19 PROCEDURE — 84443 ASSAY THYROID STIM HORMONE: CPT | Performed by: INTERNAL MEDICINE

## 2024-08-19 PROCEDURE — 85025 COMPLETE CBC W/AUTO DIFF WBC: CPT | Performed by: INTERNAL MEDICINE

## 2024-08-19 RX ORDER — GABAPENTIN 300 MG/1
300 CAPSULE ORAL 2 TIMES DAILY
Qty: 60 CAPSULE | Refills: 1 | Status: SHIPPED | OUTPATIENT
Start: 2024-08-19 | End: 2024-10-18

## 2024-08-20 NOTE — ED PROVIDER NOTES
Encounter Date: 8/19/2024       History     Chief Complaint   Patient presents with    Shortness of Breath     Sob, lightheaded, lower back pain x2 months. MRI done on back in July but PCP out of town. Denies cardiac/pulmonary hx     Presents with chronic lower back pain, requesting result of MRI due to PCP unavailable. States also feeling light headache, unable to walk well due to SOB on exertion. Feeling weak.     The history is provided by the patient and a relative.     Review of patient's allergies indicates:  No Known Allergies  Past Medical History:   Diagnosis Date    Chronic low back pain with left-sided sciatica     Diabetes mellitus     HTN (hypertension)      Past Surgical History:   Procedure Laterality Date    COLONOSCOPY N/A 4/10/2024    Procedure: COLON;  Surgeon: Ravi Petit MD;  Location: [facility] ENDOSCOPY;  Service: Gastroenterology;  Laterality: N/A;    EGD, WITH CLOSED BIOPSY  4/9/2024    Procedure: EGD, WITH CLOSED BIOPSY;  Surgeon: Ravi Petit MD;  Location: [facility] ENDOSCOPY;  Service: Gastroenterology;;    ESOPHAGOGASTRODUODENOSCOPY N/A 4/9/2024    Procedure: EGD;  Surgeon: Ravi Petit MD;  Location: [facility] ENDOSCOPY;  Service: Gastroenterology;  Laterality: N/A;    HIP SURGERY Left     LAPAROSCOPIC CHOLECYSTECTOMY N/A 4/22/2024    Procedure: CHOLECYSTECTOMY, LAPAROSCOPIC;  Surgeon: Demetrice Gardner MD;  Location: [facility];  Service: General;  Laterality: N/A;    ORIF HIP FRACTURE Right     >10 years     Family History   Problem Relation Name Age of Onset    No Known Problems Mother      Diabetes Father       Social History     Tobacco Use    Smoking status: Every Day     Current packs/day: 0.50     Average packs/day: 0.5 packs/day for 52.6 years (26.3 ttl pk-yrs)     Types: Cigarettes     Start date: 1/1/1972    Smokeless tobacco: Never   Substance Use Topics    Alcohol use: Not Currently     Comment: occ    Drug use: Never     Review of Systems   Musculoskeletal:   Positive for back pain.   Neurological:  Positive for weakness and light-headedness.   All other systems reviewed and are negative.      Physical Exam     Initial Vitals [08/19/24 1856]   BP Pulse Resp Temp SpO2   113/79 89 18 97.5 °F (36.4 °C) 100 %      MAP       --         Physical Exam    Nursing note and vitals reviewed.  Constitutional: He appears well-developed. No distress.   HENT:   Head: Normocephalic and atraumatic.   Right Ear: External ear normal.   Left Ear: External ear normal.   Nose: Nose normal.   Mouth/Throat: Oropharynx is clear and moist. No oropharyngeal exudate.   Eyes: Conjunctivae and EOM are normal. Pupils are equal, round, and reactive to light.   Neck: Neck supple. No thyromegaly present. No JVD present.   Normal range of motion.  Cardiovascular:  Normal rate, regular rhythm, normal heart sounds and intact distal pulses.           Pulmonary/Chest: Breath sounds normal. No respiratory distress.   Abdominal: Abdomen is soft. Bowel sounds are normal. He exhibits no distension. There is no abdominal tenderness. There is no rebound and no guarding.   Musculoskeletal:         General: No edema. Normal range of motion.      Cervical back: Normal range of motion and neck supple.     Neurological: He is alert and oriented to person, place, and time. He has normal strength. No cranial nerve deficit. GCS score is 15. GCS eye subscore is 4. GCS verbal subscore is 5. GCS motor subscore is 6.   Skin: Skin is warm and dry. No rash noted.   Psychiatric: His behavior is normal.         ED Course   Procedures  Labs Reviewed   CBC WITH DIFFERENTIAL - Abnormal       Result Value    WBC 8.38      RBC 4.86      Hgb 15.5      Hct 43.6      MCV 89.7      MCH 31.9 (*)     MCHC 35.6      RDW 12.1      Platelet 283      MPV 9.6      Neut % 41.9      Lymph % 50.2      Mono % 6.9      Eos % 0.4      Basophil % 0.5      Lymph # 4.21      Neut # 3.51      Mono # 0.58      Eos # 0.03      Baso # 0.04      IG# 0.01       IG% 0.1      NRBC% 0.0     MAGNESIUM - Normal    Magnesium Level 1.80     B-TYPE NATRIURETIC PEPTIDE - Normal    Natriuretic Peptide 28.1     TSH - Normal    TSH 2.514     CBC W/ AUTO DIFFERENTIAL    Narrative:     The following orders were created for panel order CBC auto differential.  Procedure                               Abnormality         Status                     ---------                               -----------         ------                     CBC with Differential[[id number]]       Abnormal            Final result                 Please view results for these tests on the individual orders.   COMPREHENSIVE METABOLIC PANEL    Sodium 137      Potassium 3.8      Chloride 103      CO2 26      Glucose 104      Blood Urea Nitrogen 15.3      Creatinine 0.84      Calcium 10.0      Protein Total 7.2      Albumin 4.0      Globulin 3.2      Albumin/Globulin Ratio 1.3      Bilirubin Total 0.6      ALP 61      ALT 23      AST 22      eGFR >60      Anion Gap 8.0      BUN/Creatinine Ratio 18     EXTRA TUBES    Narrative:     The following orders were created for panel order EXTRA TUBES.  Procedure                               Abnormality         Status                     ---------                               -----------         ------                     Light Blue Top Hold[[id number]]                             In process                 Light Green Top Hold[[id number]]                            In process                 Gold Top Hold[[id number]]                                   In process                   Please view results for these tests on the individual orders.   LIGHT BLUE TOP HOLD   LIGHT GREEN TOP HOLD   GOLD TOP HOLD     EKG Readings: (Independently Interpreted)   Initial Reading: No STEMI. Rhythm: Normal Sinus Rhythm. Heart Rate: 88. Ectopy: No Ectopy. Conduction: Normal. ST Segments: Normal ST Segments. T Waves: Normal. Clinical Impression: Normal Sinus Rhythm       Imaging Results               X-Ray Chest PA And Lateral (Final result)  Result time 08/19/24 20:02:02      Final result by Justin Persaud MD (08/19/24 20:02:02)                   Impression:      No acute cardiopulmonary abnormality.      Electronically signed by: Justin Persaud  Date:    08/19/2024  Time:    20:02               Narrative:    EXAMINATION:  XR CHEST PA AND LATERAL    CLINICAL HISTORY:  Shortness of breath    COMPARISON:  21 July 2024    FINDINGS:  PA and lateral views of the chest were obtained. Heart and mediastinum within normal limits. The lungs are clear. No pneumothorax or significant effusion.                                       CT Head Without Contrast (Final result)  Result time 08/19/24 20:04:52      Final result by Pedro Garcia MD (08/19/24 20:04:52)                   Impression:      No acute intracranial findings identified      Electronically signed by: Pedro Garcia  Date:    08/19/2024  Time:    20:04               Narrative:    EXAMINATION:  CT HEAD WITHOUT CONTRAST    CLINICAL HISTORY:  Dizziness, persistent/recurrent, cardiac or vascular cause suspected;    TECHNIQUE:  Sequential axial images were performed of the brain without contrast.    Dose product length of 955 mGycm. Automated exposure control was utilized to minimize radiation dose.    COMPARISON:  June 26, 2024.    FINDINGS:  There is no intracranial mass effect, midline shift, hydrocephalus or hemorrhage. There is no sulcal effacement or low attenuation changes to suggest recent large vessel territory infarction.  There appear mild chronic microvascular ischemia.  The ventricular system and sulcal markings prominence is consistent with atrophy. There is no acute extra axial fluid collection.    There is similar chronic defect of left lamina papyracea.  Left maxillary sinus mild network of septations or secretion.  Otherwise, visualized paranasal sinuses are clear without mucosal thickening, polypoidal abnormality or air-fluid levels. Mastoid  air cells aeration is optimal.                                       Medications - No data to display  Medical Decision Making  Amount and/or Complexity of Data Reviewed  External Data Reviewed: radiology.     Details: EXAMINATION:  MRI LUMBAR SPINE WITHOUT CONTRAST     CLINICAL HISTORY:  Lumbar radiculopathy, symptoms persist with conservative treatment;  Polyneuropathy, unspecified     TECHNIQUE:  Multiplanar, multisequence MR images were acquired from the thoracolumbar junction to the sacrum without the administration of contrast.     COMPARISON:  Lumbar spine radiograph 03/18/2024.     FINDINGS:  Straightening of the normal lumbar lordosis.  No spondylolisthesis.     No aggressive marrow signal.  No fracture.     No intrinsic abnormality of the distal cord or cauda equina.     Paraspinous soft tissues are unremarkable.     Congenitally short pedicles with mild superimposed degenerative changes as follows:     T12-L1: Unremarkable.     L1-L2: Unremarkable.     L2-L3: Unremarkable.     L3-L4: Unremarkable.     L4-L5: Shallow disc bulge indenting the ventral thecal sac.  Mild right facet arthropathy.  No significant stenosis or nerve impingement.     L5-S1: Shallow disc bulge indenting the ventral thecal sac.  No significant stenosis.  Normal appearance of the facet joints.     Impression:     No significant stenosis or nerve impingement.     Congenitally short pedicles.     Shallow disc bulges indenting the ventral thecal sac at L4-L5 and L5-S1.     Mild right facet arthropathy at L4-L5.     Straightening of the normal lumbar lordosis.        Electronically signed by:Iban Rios  Date:                                            07/18/2024  Time:                                           08:32    Labs: ordered. Decision-making details documented in ED Course.  Radiology: ordered and independent interpretation performed. Decision-making details documented in ED Course.  ECG/medicine tests: ordered and independent  interpretation performed. Decision-making details documented in ED Course.    Risk  Prescription drug management.      Additional MDM:   Differential Diagnosis:   Hypothyroidism, medication side effect, orthostatic weakness, kidney failure, stroke, among others                                      Clinical Impression:  Final diagnoses:  [R06.02] Shortness of breath  [M54.50, G89.29] Chronic midline low back pain without sciatica (Primary)  [R42] Light headedness          ED Disposition Condition    Discharge Stable          ED Prescriptions       Medication Sig Dispense Start Date End Date Auth. Provider    gabapentin (NEURONTIN) 300 MG capsule Take 1 capsule (300 mg total) by mouth 2 (two) times daily. 60 capsule 8/19/2024 10/18/2024 Tyler Hurt MD          Follow-up Information       Follow up With Specialties Details Why Contact Info    [facility] Schedule an appointment as soon as possible for a visit in 2 weeks  [address] [facility] [address]  [phone]      [facility] Emergency Medicine  If symptoms worsen [address] [facility] [id number] [phone]             Tyler Hurt MD  08/19/24 2033

## 2024-08-22 ENCOUNTER — HOSPITAL ENCOUNTER (OUTPATIENT)
Dept: CARDIOLOGY | Facility: HOSPITAL | Age: 69
Discharge: HOME | End: 2024-08-22
Attending: NURSE PRACTITIONER
Payer: MEDICARE

## 2024-08-22 VITALS
SYSTOLIC BLOOD PRESSURE: 127 MMHG | DIASTOLIC BLOOD PRESSURE: 78 MMHG | BODY MASS INDEX: 21.69 KG/M2 | HEIGHT: 66 IN | WEIGHT: 135 LBS

## 2024-08-22 DIAGNOSIS — R55 SYNCOPE AND COLLAPSE: ICD-10-CM

## 2024-08-22 LAB
APICAL FOUR CHAMBER EJECTION FRACTION: 61 %
APICAL TWO CHAMBER EJECTION FRACTION: 66 %
BSA FOR ECHO PROCEDURE: 1.69 M2
CV ECHO LV RWT: 0.56 CM
DOP CALC LVOT AREA: 3.1 CM2
DOP CALC LVOT DIAMETER: 1.99 CM
DOP CALC MV VTI: 17.8 CM
E WAVE DECELERATION TIME: 335.32 MSEC
E/A RATIO: 0.83
ECHO LV POSTERIOR WALL: 0.82 CM (ref 0.6–1.1)
FRACTIONAL SHORTENING: 37 % (ref 28–44)
HR MV ECHO: 88 BPM
INTERVENTRICULAR SEPTUM: 0.9 CM (ref 0.6–1.1)
IVC DIAMETER: 1.3 CM
LEFT ATRIUM AREA SYSTOLIC (APICAL 2 CHAMBER): 9.27 CM2
LEFT ATRIUM AREA SYSTOLIC (APICAL 4 CHAMBER): 8.21 CM2
LEFT ATRIUM SIZE: 2.63 CM
LEFT ATRIUM VOLUME INDEX MOD: 9.8 ML/M2
LEFT ATRIUM VOLUME MOD: 16.54 CM3
LEFT INTERNAL DIMENSION IN SYSTOLE: 1.84 CM (ref 2.1–4)
LEFT VENTRICLE DIASTOLIC VOLUME INDEX: 19.35 ML/M2
LEFT VENTRICLE DIASTOLIC VOLUME: 32.7 ML
LEFT VENTRICLE END DIASTOLIC VOLUME APICAL 2 CHAMBER: 42.46 ML
LEFT VENTRICLE END DIASTOLIC VOLUME APICAL 4 CHAMBER: 29.09 ML
LEFT VENTRICLE END SYSTOLIC VOLUME APICAL 2 CHAMBER: 16.52 ML
LEFT VENTRICLE END SYSTOLIC VOLUME APICAL 4 CHAMBER: 14.97 ML
LEFT VENTRICLE MASS INDEX: 37 G/M2
LEFT VENTRICLE SYSTOLIC VOLUME INDEX: 6.1 ML/M2
LEFT VENTRICLE SYSTOLIC VOLUME: 10.31 ML
LEFT VENTRICULAR INTERNAL DIMENSION IN DIASTOLE: 2.92 CM (ref 3.5–6)
LEFT VENTRICULAR MASS: 63 G
LV LATERAL E/E' RATIO: 4.92 M/S
LVED V (TEICH): 32.7 ML
LVES V (TEICH): 10.31 ML
MV MEAN GRADIENT: 1 MMHG
MV PEAK A VEL: 0.71 M/S
MV PEAK E VEL: 0.59 M/S
MV PEAK GRADIENT: 3 MMHG
OHS LV EJECTION FRACTION SIMPSONS BIPLANE MOD: 63 %
PISA MRMAX VEL: 2.67 M/S
PISA TR MAX VEL: 2.7 M/S
RA MAJOR: 3.37 CM
RA PRESSURE ESTIMATED: 3 MMHG
RV TB RVSP: 6 MMHG
TDI LATERAL: 0.12 M/S
TR MAX PG: 29 MMHG
TRICUSPID ANNULAR PLANE SYSTOLIC EXCURSION: 1.96 CM
TV REST PULMONARY ARTERY PRESSURE: 32 MMHG
Z-SCORE OF LEFT VENTRICULAR DIMENSION IN END DIASTOLE: -4.77
Z-SCORE OF LEFT VENTRICULAR DIMENSION IN END SYSTOLE: -3.64

## 2024-08-22 PROCEDURE — 93306 TTE W/DOPPLER COMPLETE: CPT

## 2024-08-29 ENCOUNTER — TELEPHONE (OUTPATIENT)
Dept: INFECTIOUS DISEASES | Facility: CLINIC | Age: 69
End: 2024-08-29
Payer: MEDICARE

## 2024-08-29 NOTE — TELEPHONE ENCOUNTER
On 8-MAY-24 Citlali Spencer LPN informed me patient was seeking treatment for his HCV at the [facility], yellow HCV chart was scanned into Epic.  Patient is currently has a HCV viral load that is not detected.  Please remove from follow-up

## 2024-08-30 ENCOUNTER — TELEPHONE (OUTPATIENT)
Dept: INTERNAL MEDICINE | Facility: CLINIC | Age: 69
End: 2024-08-30
Payer: MEDICARE

## 2024-08-30 DIAGNOSIS — R55 SYNCOPE, UNSPECIFIED SYNCOPE TYPE: Primary | ICD-10-CM

## 2024-08-30 DIAGNOSIS — R94.31 ABNORMAL EKG: ICD-10-CM

## 2024-08-30 NOTE — TELEPHONE ENCOUNTER
Echo results WNL. Call pt and inform results WNL and that since he had a prior EKG in July of this year showing a slight abnormality I am sending a referral to [facility] outside of [facility] for further evaluation and management. Encourage pt to go to ER if symptoms persist or worsens.

## 2024-09-05 ENCOUNTER — TELEPHONE (OUTPATIENT)
Dept: INTERNAL MEDICINE | Facility: CLINIC | Age: 69
End: 2024-09-05
Payer: MEDICARE

## 2024-09-05 LAB
LEFT EYE DM RETINOPATHY: NEGATIVE
RIGHT EYE DM RETINOPATHY: NEGATIVE

## 2024-09-05 NOTE — TELEPHONE ENCOUNTER
Attempted to contact patient to inform him a referral was sent to Dr WISE office per emergency room. Patient unable to be reached at this time.

## 2024-09-06 DIAGNOSIS — R55 SYNCOPE, UNSPECIFIED SYNCOPE TYPE: Primary | ICD-10-CM

## 2024-09-06 DIAGNOSIS — R94.31 ABNORMAL EKG: ICD-10-CM

## 2024-09-06 NOTE — PROGRESS NOTES
Refer to Dr Cramer for eval and mgmt of syncope/ Abnl EKG. Pt requesting handicap license form completed due to spinal canal stenosis. Handicap form completed.

## 2024-09-06 NOTE — TELEPHONE ENCOUNTER
Called pts insurance company to inquire about a Neurosurgeon that accepts pts insurance. Was informed to contact [phone] for a list of [place] providers [phone].      Contacted number above and kept getting the auto system asking for PTAN number to further answer questions. Please advise.

## 2024-09-06 NOTE — LETTER
I certify that (Name) Heath Holder meets the requirements as outlined in # 2 (shown on reverse side) and qualifies for a mobility impaired license plate/hang-tag. I further understand that willful and false certification shall subject me to fines/imprisonment as outlined in R.S. 47:463.4 (G) (4). The applicant's information is as follows:    YOB: 1955            Race:Black or         Gender:Male    Address:  [address] [place] [address]    City:[place]                               State:[place]     Zip Code:[address]     []Permanently Impaired - Applicant has a total or lifelong condition of mobility impairment from which little or no improvement or recovery can reasonably be expected. A medical examiners certification is required on initial application only.      [x] Temporarily Impaired - Applicant has a temporary condition of mobility impairment of which improvement or recovery can reasonably be expected. Applicant is entitled to a hangtag, which will be valid for one (1) year. A medical examiners certification is required for the renewal of the hangtag      [] Unable to appear in person at the Office of Motor Vehicles - Applicant must bring facial photo        Medical Examiner's Signature________________________________________ Date:9/6/24_________________________    Printed Name:MejiaMarilu Alirio FNP-BC____________________    State License #__[id number]________    Address: [address] [place] [address]___                                     Phone Number: [phone]                                                                                                                                                                                                                  City: [place]__________________________________ State: [place] __Zip Code: [address] _______________    TO BE COMPLETED BY MOTOR VEHICLE ANALYST ONLY    REJI   Lic. Plate #      Hangtag Control #   Hangtag ID #      Date Issued:     #:   Office #:

## 2024-09-09 DIAGNOSIS — M48.061 SPINAL STENOSIS OF LUMBAR REGION, UNSPECIFIED WHETHER NEUROGENIC CLAUDICATION PRESENT: Primary | ICD-10-CM

## 2024-09-09 NOTE — PROGRESS NOTES
Pt was referred to Dr Puckett per ER for spinal stenosis due to chronic back pain. Referral to Dr Puckett was denied. Pt was offered referral to Dr Donato Cerda however there is a 6 month waiting list. Pt refused to wait that long. Informed will refer to [facility] neurosurgery for eval and mgmt of spinal stenosis.

## 2024-09-15 DIAGNOSIS — E11.9 CONTROLLED TYPE 2 DIABETES MELLITUS WITHOUT COMPLICATION, WITHOUT LONG-TERM CURRENT USE OF INSULIN: ICD-10-CM

## 2024-09-16 ENCOUNTER — TELEPHONE (OUTPATIENT)
Dept: INTERNAL MEDICINE | Facility: CLINIC | Age: 69
End: 2024-09-16
Payer: MEDICARE

## 2024-09-16 RX ORDER — METFORMIN HYDROCHLORIDE 1000 MG/1
1000 TABLET ORAL 2 TIMES DAILY WITH MEALS
Qty: 180 TABLET | Refills: 1 | OUTPATIENT
Start: 2024-09-16

## 2024-09-16 NOTE — TELEPHONE ENCOUNTER
Received a call from Chandrakant at [facility] requesting HH for pt due to dizzy spells and fogginess. Chandrakant stated she'll just the LOV and  order staing for Nursing PT and Evaluation faxed to the number listed below. Please advise.      [facility]  Ph:[phone]  Fax:[phone]

## 2024-09-17 DIAGNOSIS — R42 DIZZINESS: Primary | ICD-10-CM

## 2024-09-17 NOTE — PROGRESS NOTES
[facility] to eval and treat for dizziness. PT ordered and faxed to  agency.

## 2024-09-20 ENCOUNTER — DOCUMENTATION ONLY (OUTPATIENT)
Dept: INTERNAL MEDICINE | Facility: CLINIC | Age: 69
End: 2024-09-20
Payer: MEDICARE

## 2024-09-30 ENCOUNTER — LAB REQUISITION (OUTPATIENT)
Dept: LAB | Facility: HOSPITAL | Age: 69
End: 2024-09-30
Payer: MEDICARE

## 2024-09-30 DIAGNOSIS — I10 ESSENTIAL (PRIMARY) HYPERTENSION: ICD-10-CM

## 2024-09-30 DIAGNOSIS — B18.2 CHRONIC VIRAL HEPATITIS C: ICD-10-CM

## 2024-09-30 DIAGNOSIS — E43 UNSPECIFIED SEVERE PROTEIN-CALORIE MALNUTRITION: ICD-10-CM

## 2024-09-30 DIAGNOSIS — E11.69 TYPE 2 DIABETES MELLITUS WITH OTHER SPECIFIED COMPLICATION: ICD-10-CM

## 2024-09-30 DIAGNOSIS — R62.7 ADULT FAILURE TO THRIVE: ICD-10-CM

## 2024-09-30 LAB
25(OH)D3+25(OH)D2 SERPL-MCNC: 10 NG/ML (ref 30–80)
ALBUMIN SERPL-MCNC: 3.9 G/DL (ref 3.4–4.8)
ALBUMIN/GLOB SERPL: 1.3 RATIO (ref 1.1–2)
ALP SERPL-CCNC: 47 UNIT/L (ref 40–150)
ALT SERPL-CCNC: 18 UNIT/L (ref 0–55)
ANION GAP SERPL CALC-SCNC: 9 MEQ/L
AST SERPL-CCNC: 25 UNIT/L (ref 5–34)
BASOPHILS # BLD AUTO: 0.02 X10(3)/MCL
BASOPHILS NFR BLD AUTO: 0.4 %
BILIRUB SERPL-MCNC: 1 MG/DL
BUN SERPL-MCNC: 29.1 MG/DL (ref 8.4–25.7)
CALCIUM SERPL-MCNC: 9.8 MG/DL (ref 8.8–10)
CHLORIDE SERPL-SCNC: 102 MMOL/L (ref 98–107)
CHOLEST SERPL-MCNC: 192 MG/DL
CHOLEST/HDLC SERPL: 4 {RATIO} (ref 0–5)
CO2 SERPL-SCNC: 28 MMOL/L (ref 23–31)
CREAT SERPL-MCNC: 0.83 MG/DL (ref 0.73–1.18)
CREAT/UREA NIT SERPL: 35
EOSINOPHIL # BLD AUTO: 0.04 X10(3)/MCL (ref 0–0.9)
EOSINOPHIL NFR BLD AUTO: 0.7 %
ERYTHROCYTE [DISTWIDTH] IN BLOOD BY AUTOMATED COUNT: 11.9 % (ref 11.5–17)
EST. AVERAGE GLUCOSE BLD GHB EST-MCNC: 114 MG/DL
FERRITIN SERPL-MCNC: 717.91 NG/ML (ref 21.81–274.66)
GFR SERPLBLD CREATININE-BSD FMLA CKD-EPI: >60 ML/MIN/1.73/M2
GGT SERPL-CCNC: 26 U/L (ref 12–64)
GLOBULIN SER-MCNC: 2.9 GM/DL (ref 2.4–3.5)
GLUCOSE SERPL-MCNC: 94 MG/DL (ref 82–115)
HBA1C MFR BLD: 5.6 %
HBV CORE AB SERPL QL IA: REACTIVE
HBV CORE IGM SERPL QL IA: NONREACTIVE
HCT VFR BLD AUTO: 45 % (ref 42–52)
HDLC SERPL-MCNC: 48 MG/DL (ref 35–60)
HGB BLD-MCNC: 15.5 G/DL (ref 14–18)
HIV 1+2 AB+HIV1 P24 AG SERPL QL IA: NONREACTIVE
IMM GRANULOCYTES # BLD AUTO: 0.02 X10(3)/MCL (ref 0–0.04)
IMM GRANULOCYTES NFR BLD AUTO: 0.4 %
IRON SATN MFR SERPL: 49 % (ref 20–50)
IRON SERPL-MCNC: 133 UG/DL (ref 65–175)
LDLC SERPL CALC-MCNC: 116 MG/DL (ref 50–140)
LYMPHOCYTES # BLD AUTO: 2.89 X10(3)/MCL (ref 0.6–4.6)
LYMPHOCYTES NFR BLD AUTO: 52.6 %
MAGNESIUM SERPL-MCNC: 1.4 MG/DL (ref 1.6–2.6)
MCH RBC QN AUTO: 30.8 PG (ref 27–31)
MCHC RBC AUTO-ENTMCNC: 34.4 G/DL (ref 33–36)
MCV RBC AUTO: 89.5 FL (ref 80–94)
MONOCYTES # BLD AUTO: 0.3 X10(3)/MCL (ref 0.1–1.3)
MONOCYTES NFR BLD AUTO: 5.5 %
NEUTROPHILS # BLD AUTO: 2.22 X10(3)/MCL (ref 2.1–9.2)
NEUTROPHILS NFR BLD AUTO: 40.4 %
NRBC BLD AUTO-RTO: 0 %
PLATELET # BLD AUTO: 240 X10(3)/MCL (ref 130–400)
PMV BLD AUTO: 10.1 FL (ref 7.4–10.4)
POTASSIUM SERPL-SCNC: 4.5 MMOL/L (ref 3.5–5.1)
PROT SERPL-MCNC: 6.8 GM/DL (ref 5.8–7.6)
RBC # BLD AUTO: 5.03 X10(6)/MCL (ref 4.7–6.1)
SODIUM SERPL-SCNC: 139 MMOL/L (ref 136–145)
T4 FREE SERPL-MCNC: 1.17 NG/DL (ref 0.7–1.48)
TIBC SERPL-MCNC: 140 UG/DL (ref 69–240)
TIBC SERPL-MCNC: 273 UG/DL (ref 250–450)
TRANSFERRIN SERPL-MCNC: 237 MG/DL (ref 163–344)
TRIGL SERPL-MCNC: 142 MG/DL (ref 34–140)
TSH SERPL-ACNC: 2.78 UIU/ML (ref 0.35–4.94)
VLDLC SERPL CALC-MCNC: 28 MG/DL
WBC # BLD AUTO: 5.49 X10(3)/MCL (ref 4.5–11.5)

## 2024-09-30 PROCEDURE — 87517 HEPATITIS B DNA QUANT: CPT

## 2024-09-30 PROCEDURE — 84443 ASSAY THYROID STIM HORMONE: CPT

## 2024-09-30 PROCEDURE — 83540 ASSAY OF IRON: CPT

## 2024-09-30 PROCEDURE — 87389 HIV-1 AG W/HIV-1&-2 AB AG IA: CPT

## 2024-09-30 PROCEDURE — 82977 ASSAY OF GGT: CPT

## 2024-09-30 PROCEDURE — 82728 ASSAY OF FERRITIN: CPT

## 2024-09-30 PROCEDURE — 82306 VITAMIN D 25 HYDROXY: CPT

## 2024-09-30 PROCEDURE — 83735 ASSAY OF MAGNESIUM: CPT

## 2024-09-30 PROCEDURE — 80053 COMPREHEN METABOLIC PANEL: CPT

## 2024-09-30 PROCEDURE — 84439 ASSAY OF FREE THYROXINE: CPT

## 2024-09-30 PROCEDURE — 83550 IRON BINDING TEST: CPT

## 2024-09-30 PROCEDURE — 86705 HEP B CORE ANTIBODY IGM: CPT

## 2024-09-30 PROCEDURE — 80061 LIPID PANEL: CPT

## 2024-09-30 PROCEDURE — 83036 HEMOGLOBIN GLYCOSYLATED A1C: CPT

## 2024-09-30 PROCEDURE — 86704 HEP B CORE ANTIBODY TOTAL: CPT

## 2024-09-30 PROCEDURE — 85025 COMPLETE CBC W/AUTO DIFF WBC: CPT

## 2024-10-01 LAB — HCV RNA SERPL NAA+PROBE-ACNC: NORMAL IU/ML

## 2024-10-02 LAB — HBV DNA SERPL NAA+PROBE-ACNC: NORMAL IU/ML

## 2024-11-14 DIAGNOSIS — M54.42 CHRONIC LEFT-SIDED LOW BACK PAIN WITH LEFT-SIDED SCIATICA: ICD-10-CM

## 2024-11-14 DIAGNOSIS — G89.29 CHRONIC LEFT-SIDED LOW BACK PAIN WITH LEFT-SIDED SCIATICA: ICD-10-CM

## 2024-11-20 RX ORDER — DICLOFENAC SODIUM 75 MG/1
TABLET, DELAYED RELEASE ORAL
Qty: 60 TABLET | Refills: 4 | Status: SHIPPED | OUTPATIENT
Start: 2024-11-20

## 2024-11-27 ENCOUNTER — OFFICE VISIT (OUTPATIENT)
Dept: URGENT CARE | Facility: CLINIC | Age: 69
End: 2024-11-27
Payer: MEDICARE

## 2024-11-27 VITALS
DIASTOLIC BLOOD PRESSURE: 84 MMHG | TEMPERATURE: 98 F | WEIGHT: 111 LBS | HEART RATE: 95 BPM | RESPIRATION RATE: 18 BRPM | OXYGEN SATURATION: 99 % | BODY MASS INDEX: 17.84 KG/M2 | SYSTOLIC BLOOD PRESSURE: 116 MMHG | HEIGHT: 66 IN

## 2024-11-27 DIAGNOSIS — R05.1 ACUTE COUGH: Primary | ICD-10-CM

## 2024-11-27 DIAGNOSIS — Z20.822 CLOSE EXPOSURE TO COVID-19 VIRUS: ICD-10-CM

## 2024-11-27 LAB
CTP QC/QA: YES
SARS-COV-2 AG RESP QL IA.RAPID: NEGATIVE

## 2024-11-27 PROCEDURE — 87811 SARS-COV-2 COVID19 W/OPTIC: CPT | Mod: PBBFAC

## 2024-11-27 PROCEDURE — 99214 OFFICE O/P EST MOD 30 MIN: CPT | Mod: PBBFAC

## 2024-11-27 PROCEDURE — 99213 OFFICE O/P EST LOW 20 MIN: CPT | Mod: S$PBB,,,

## 2024-11-27 NOTE — PROGRESS NOTES
"Subjective:       Patient ID: Heath Holder is a 69 y.o. male.    Vitals:  height is 5' 6" (1.676 m) and weight is 50.3 kg (111 lb). His oral temperature is 97.8 °F (36.6 °C). His blood pressure is 116/84 and his pulse is 95. His respiration is 18 and oxygen saturation is 99%.     Chief Complaint: Cough (Cough and weakness, exposure to Covid)    68 y/o AAM with hx of HTN and DM, requesting COVID screening, staff girlfriend tested positive today, he reports mild intermittent coughing only.     Cough  Associated symptoms include chills. Pertinent negatives include no ear pain, fever, headaches or sore throat.       Constitution: Positive for chills. Negative for sweating, fever and generalized weakness.   HENT:  Negative for ear pain and sore throat.    Respiratory:  Positive for cough. Negative for sputum production.    Neurological:  Negative for headaches.       Objective:      Physical Exam   Constitutional: He is oriented to person, place, and time. He appears well-developed. He is cooperative. He is easily aroused.  Non-toxic appearance. He does not appear ill. well-groomed and underweightawake  HENT:   Head: Normocephalic and atraumatic.   Nose: Rhinorrhea (clear) present.   Mouth/Throat: Uvula is midline, oropharynx is clear and moist and mucous membranes are normal. No tonsillar exudate.   Eyes: Conjunctivae and lids are normal.   Neck: Neck supple.   Cardiovascular: Normal rate.   Pulmonary/Chest: Effort normal and breath sounds normal.   Abdominal: Normal appearance.   Neurological: He is alert, oriented to person, place, and time and easily aroused. Gait normal. GCS eye subscore is 4. GCS verbal subscore is 5. GCS motor subscore is 6.   Skin: Skin is warm, dry, intact and not diaphoretic. Capillary refill takes less than 2 seconds.   Psychiatric: His behavior is normal.   Nursing note and vitals reviewed.        Assessment:       1. Acute cough    2. Close exposure to COVID-19 virus          Plan:     Exam " is benign COVID is negative, encouraged patient to monitor for worsening symptoms, may take HBP Coricidin as needed, return to clinic if symptoms worsening.     Acute cough    Close exposure to COVID-19 virus  -     SARS Coronavirus 2 Antigen, POCT Manual Read           Results for orders placed or performed in visit on 11/27/24   SARS Coronavirus 2 Antigen, POCT Manual Read    Collection Time: 11/27/24  4:49 PM   Result Value Ref Range    SARS Coronavirus 2 Antigen Negative Negative     Acceptable Yes

## 2024-11-27 NOTE — PROGRESS NOTES
"Subjective:       Patient ID: Heath Holder is a 69 y.o. male.    Vitals:  height is 5' 6" (1.676 m) and weight is 50.3 kg (111 lb). His oral temperature is 97.8 °F (36.6 °C). His blood pressure is 116/84 and his pulse is 95. His respiration is 18 and oxygen saturation is 99%.     Chief Complaint: Cough (Cough and weakness, exposure to Covid)    Cough  Associated symptoms include chills. Pertinent negatives include no sore throat or shortness of breath.       Constitution: Positive for chills. Negative for generalized weakness.   HENT:  Negative for sore throat.    Respiratory:  Positive for cough. Negative for sputum production, shortness of breath and asthma.    Allergic/Immunologic: Negative for asthma.       Objective:      Physical Exam      Assessment:       1. Close exposure to COVID-19 virus          Plan:     Poor historian     Close exposure to COVID-19 virus  -     SARS Coronavirus 2 Antigen, POCT Manual Read           Results for orders placed or performed in visit on 11/27/24   SARS Coronavirus 2 Antigen, POCT Manual Read    Collection Time: 11/27/24  4:49 PM   Result Value Ref Range    SARS Coronavirus 2 Antigen Negative Negative     Acceptable Yes                   "

## 2024-12-26 ENCOUNTER — LAB REQUISITION (OUTPATIENT)
Dept: LAB | Facility: HOSPITAL | Age: 69
End: 2024-12-26
Payer: MEDICARE

## 2024-12-26 DIAGNOSIS — E43 UNSPECIFIED SEVERE PROTEIN-CALORIE MALNUTRITION: ICD-10-CM

## 2024-12-26 DIAGNOSIS — E11.00 TYPE 2 DIABETES MELLITUS WITH HYPEROSMOLARITY WITHOUT NONKETOTIC HYPERGLYCEMIC-HYPEROSMOLAR COMA (NKHHC): ICD-10-CM

## 2024-12-26 DIAGNOSIS — D64.9 ANEMIA, UNSPECIFIED: ICD-10-CM

## 2024-12-26 DIAGNOSIS — I10 ESSENTIAL (PRIMARY) HYPERTENSION: ICD-10-CM

## 2024-12-26 LAB
25(OH)D3+25(OH)D2 SERPL-MCNC: 15 NG/ML (ref 30–80)
ALBUMIN SERPL-MCNC: 4.1 G/DL (ref 3.4–4.8)
ALBUMIN/GLOB SERPL: 1.4 RATIO (ref 1.1–2)
ALP SERPL-CCNC: 68 UNIT/L (ref 40–150)
ALT SERPL-CCNC: 10 UNIT/L (ref 0–55)
ANION GAP SERPL CALC-SCNC: 9 MEQ/L
AST SERPL-CCNC: 18 UNIT/L (ref 5–34)
BASOPHILS # BLD AUTO: 0.04 X10(3)/MCL
BASOPHILS NFR BLD AUTO: 0.7 %
BILIRUB SERPL-MCNC: 0.8 MG/DL
BUN SERPL-MCNC: 23.5 MG/DL (ref 8.4–25.7)
CALCIUM SERPL-MCNC: 9.5 MG/DL (ref 8.8–10)
CHLORIDE SERPL-SCNC: 103 MMOL/L (ref 98–107)
CO2 SERPL-SCNC: 29 MMOL/L (ref 23–31)
CREAT SERPL-MCNC: 0.75 MG/DL (ref 0.72–1.25)
CREAT/UREA NIT SERPL: 31
CRP SERPL-MCNC: <1 MG/L
EOSINOPHIL # BLD AUTO: 0.06 X10(3)/MCL (ref 0–0.9)
EOSINOPHIL NFR BLD AUTO: 1.1 %
ERYTHROCYTE [DISTWIDTH] IN BLOOD BY AUTOMATED COUNT: 12.3 % (ref 11.5–17)
ERYTHROCYTE [SEDIMENTATION RATE] IN BLOOD: 16 MM/HR (ref 0–15)
FERRITIN SERPL-MCNC: 530.58 NG/ML (ref 21.81–274.66)
GFR SERPLBLD CREATININE-BSD FMLA CKD-EPI: >60 ML/MIN/1.73/M2
GLOBULIN SER-MCNC: 2.9 GM/DL (ref 2.4–3.5)
GLUCOSE SERPL-MCNC: 83 MG/DL (ref 82–115)
HCT VFR BLD AUTO: 38.2 % (ref 42–52)
HGB BLD-MCNC: 12.7 G/DL (ref 14–18)
IMM GRANULOCYTES # BLD AUTO: 0.02 X10(3)/MCL (ref 0–0.04)
IMM GRANULOCYTES NFR BLD AUTO: 0.4 %
IRON SATN MFR SERPL: 28 % (ref 20–50)
IRON SERPL-MCNC: 77 UG/DL (ref 65–175)
LYMPHOCYTES # BLD AUTO: 2.74 X10(3)/MCL (ref 0.6–4.6)
LYMPHOCYTES NFR BLD AUTO: 48.8 %
MAGNESIUM SERPL-MCNC: 1.6 MG/DL (ref 1.6–2.6)
MCH RBC QN AUTO: 30.7 PG (ref 27–31)
MCHC RBC AUTO-ENTMCNC: 33.2 G/DL (ref 33–36)
MCV RBC AUTO: 92.3 FL (ref 80–94)
MONOCYTES # BLD AUTO: 0.39 X10(3)/MCL (ref 0.1–1.3)
MONOCYTES NFR BLD AUTO: 6.9 %
NEUTROPHILS # BLD AUTO: 2.37 X10(3)/MCL (ref 2.1–9.2)
NEUTROPHILS NFR BLD AUTO: 42.1 %
NRBC BLD AUTO-RTO: 0 %
PLATELET # BLD AUTO: 224 X10(3)/MCL (ref 130–400)
PMV BLD AUTO: 10.1 FL (ref 7.4–10.4)
POTASSIUM SERPL-SCNC: 4 MMOL/L (ref 3.5–5.1)
PROT SERPL-MCNC: 7 GM/DL (ref 5.8–7.6)
RBC # BLD AUTO: 4.14 X10(6)/MCL (ref 4.7–6.1)
RET# (OHS): 0.04 X10E6/UL (ref 0.03–0.1)
RETICULOCYTE COUNT AUTOMATED (OLG): 0.89 % (ref 1.1–2.1)
SODIUM SERPL-SCNC: 141 MMOL/L (ref 136–145)
TIBC SERPL-MCNC: 195 UG/DL (ref 60–240)
TIBC SERPL-MCNC: 272 UG/DL (ref 250–450)
TRANSFERRIN SERPL-MCNC: 239 MG/DL (ref 163–344)
WBC # BLD AUTO: 5.62 X10(3)/MCL (ref 4.5–11.5)

## 2024-12-26 PROCEDURE — 85652 RBC SED RATE AUTOMATED: CPT

## 2024-12-26 PROCEDURE — 80053 COMPREHEN METABOLIC PANEL: CPT

## 2024-12-26 PROCEDURE — 82306 VITAMIN D 25 HYDROXY: CPT

## 2024-12-26 PROCEDURE — 82728 ASSAY OF FERRITIN: CPT

## 2024-12-26 PROCEDURE — 83735 ASSAY OF MAGNESIUM: CPT

## 2024-12-26 PROCEDURE — 83550 IRON BINDING TEST: CPT

## 2024-12-26 PROCEDURE — 85045 AUTOMATED RETICULOCYTE COUNT: CPT

## 2024-12-26 PROCEDURE — 86140 C-REACTIVE PROTEIN: CPT

## 2024-12-26 PROCEDURE — 85025 COMPLETE CBC W/AUTO DIFF WBC: CPT

## 2025-01-02 LAB — VIEW PATHOLOGY REPORT (RELIAPATH): NORMAL

## 2025-01-14 ENCOUNTER — HOSPITAL ENCOUNTER (EMERGENCY)
Facility: HOSPITAL | Age: 70
Discharge: HOME | End: 2025-01-14
Attending: STUDENT IN AN ORGANIZED HEALTH CARE EDUCATION/TRAINING PROGRAM
Payer: MEDICARE

## 2025-01-14 VITALS
RESPIRATION RATE: 16 BRPM | WEIGHT: 124.56 LBS | BODY MASS INDEX: 19.55 KG/M2 | OXYGEN SATURATION: 100 % | SYSTOLIC BLOOD PRESSURE: 119 MMHG | DIASTOLIC BLOOD PRESSURE: 71 MMHG | TEMPERATURE: 99 F | HEART RATE: 87 BPM | HEIGHT: 67 IN

## 2025-01-14 DIAGNOSIS — L29.9 PRURITUS: Primary | ICD-10-CM

## 2025-01-14 DIAGNOSIS — L85.3 DRY SKIN: ICD-10-CM

## 2025-01-14 PROCEDURE — 99283 EMERGENCY DEPT VISIT LOW MDM: CPT

## 2025-01-14 PROCEDURE — 25000003 PHARM REV CODE 250: Performed by: NURSE PRACTITIONER

## 2025-01-14 RX ORDER — HYDROXYZINE PAMOATE 25 MG/1
25 CAPSULE ORAL EVERY 6 HOURS PRN
Qty: 15 CAPSULE | Refills: 0 | Status: SHIPPED | OUTPATIENT
Start: 2025-01-14

## 2025-01-14 RX ORDER — HYDROXYZINE PAMOATE 25 MG/1
25 CAPSULE ORAL
Status: COMPLETED | OUTPATIENT
Start: 2025-01-14 | End: 2025-01-14

## 2025-01-14 RX ADMIN — HYDROXYZINE PAMOATE 25 MG: 25 CAPSULE ORAL at 11:01

## 2025-01-15 NOTE — ED PROVIDER NOTES
Encounter Date: 1/14/2025       History     Chief Complaint   Patient presents with    Rash     Pt to ED c/o skin irritation x 4 days. States feels like little needles poking his whole body. States is itchy. Hx of diabetes     The patient presents with itching to lower back and upper thighs for the last week. He denies any rash, fever, or chills. He requests medication for itching.      Review of patient's allergies indicates:  No Known Allergies  Past Medical History:   Diagnosis Date    Chronic low back pain with left-sided sciatica     Diabetes mellitus     HTN (hypertension)      Past Surgical History:   Procedure Laterality Date    COLONOSCOPY N/A 4/10/2024    Procedure: COLON;  Surgeon: Ravi Petit MD;  Location: [facility] ENDOSCOPY;  Service: Gastroenterology;  Laterality: N/A;    EGD, WITH CLOSED BIOPSY  4/9/2024    Procedure: EGD, WITH CLOSED BIOPSY;  Surgeon: Ravi Petit MD;  Location: [facility] ENDOSCOPY;  Service: Gastroenterology;;    ESOPHAGOGASTRODUODENOSCOPY N/A 4/9/2024    Procedure: EGD;  Surgeon: Ravi Petit MD;  Location: [facility] ENDOSCOPY;  Service: Gastroenterology;  Laterality: N/A;    HIP SURGERY Left     LAPAROSCOPIC CHOLECYSTECTOMY N/A 4/22/2024    Procedure: CHOLECYSTECTOMY, LAPAROSCOPIC;  Surgeon: Demetrice Gardner MD;  Location: [facility];  Service: General;  Laterality: N/A;    ORIF HIP FRACTURE Right     >10 years     Family History   Problem Relation Name Age of Onset    No Known Problems Mother      Diabetes Father       Social History     Tobacco Use    Smoking status: Every Day     Current packs/day: 0.50     Average packs/day: 0.5 packs/day for 53.0 years (26.5 ttl pk-yrs)     Types: Cigarettes     Start date: 1/1/1972    Smokeless tobacco: Never   Substance Use Topics    Alcohol use: Not Currently     Comment: occ    Drug use: Never     Review of Systems   Constitutional:  Negative for fever.   HENT:  Negative for sore throat.    Respiratory:  Negative for  shortness of breath.    Cardiovascular:  Negative for chest pain.   Gastrointestinal:  Negative for nausea.   Genitourinary:  Negative for dysuria.   Musculoskeletal:  Negative for back pain.   Skin:  Negative for rash.   Neurological:  Negative for weakness.   Hematological:  Does not bruise/bleed easily.   All other systems reviewed and are negative.      Physical Exam     Initial Vitals [01/14/25 2242]   BP Pulse Resp Temp SpO2   119/71 87 16 98.6 °F (37 °C) 100 %      MAP       --         Physical Exam    Nursing note and vitals reviewed.  Constitutional: He appears well-developed and well-nourished.   HENT:   Head: Normocephalic and atraumatic.   Neck: Neck supple.   Normal range of motion.  Cardiovascular:  Normal rate, regular rhythm, normal heart sounds and intact distal pulses.           Pulmonary/Chest: Effort normal and breath sounds normal. He has no decreased breath sounds.   Abdominal: Abdomen is soft and flat. Bowel sounds are normal. There is no abdominal tenderness.   Musculoskeletal:         General: Normal range of motion.      Cervical back: Normal range of motion and neck supple.     Neurological: He is alert and oriented to person, place, and time. He has normal strength.   Skin: Skin is warm and dry.   Skin to lower back and upper thighs at areas of concern negative for rash but appears very dry, no erythema or s/s infection   Psychiatric: He has a normal mood and affect.         ED Course   Procedures  Labs Reviewed - No data to display       Imaging Results    None          Medications   hydrOXYzine pamoate capsule 25 mg (has no administration in time range)     Medical Decision Making  The patient presents with itching to lower back and upper thighs for the last week. He denies any rash, fever, or chills. He requests medication for itching.    Will give information on causes or pruritus and prescribe course of hydroxyzine for itching. He will f/u with his pcp.11:05 PM DISPOSITION: The  patient is resting comfortably in no acute distress.  He is hemodynamically stable and is without objective evidence for acute process requiring urgent intervention or hospitalization. I provided counseling to patient with regard to condition, the treatment plan, specific conditions for return, and the importance of follow up. Detailed written and verbal instructions provided to patient and he expressed a verbal understanding of the discharge instructions and overall management plan. Reiterated the importance of medication administration and safety and advised patient to follow up with primary care provider in 3-5 days or sooner if needed.  Answered questions at this time. The patient is stable for discharge.       Risk  Prescription drug management.      Additional MDM:   Differential Diagnosis:   At this time differential diagnosis is but not limited to contact dermatitis, eczema, tinea, pruritus, dry skin             ED Course as of 01/14/25 2306 Tue Jan 14, 2025 2304 Given strict ED return precautions. I have spoken with the patient and/or caregivers. I have explained the patient's condition, diagnoses and treatment plan based on the information available to me at this time. I have answered the patient's and/or caregiver's questions and addressed any concerns. The patient and/or caregivers have as good an understanding of the patient's diagnosis, condition and treatment plan as can be expected at this point. The vital signs have been stable. The patient's condition is stable and appropriate for discharge from the emergency department.      The patient will pursue further outpatient evaluation with the primary care physician or other designated or consulting physician as outlined in the discharge instructions. The patient and/or caregivers are agreeable to this plan of care and follow-up instructions have been explained in detail. The patient and/or caregivers have received these instructions in written  format and have expressed an understanding of the discharge instructions. The patient and/or caregivers are aware that any significant change in condition or worsening of symptoms should prompt an immediate return to this or the closest emergency department or a call to 911.      [RB]      ED Course User Index  [RB] Roby Bennett ACNP                           Clinical Impression:  Final diagnoses:  [L29.9] Pruritus (Primary)  [L85.3] Dry skin          ED Disposition Condition    Discharge Stable          ED Prescriptions       Medication Sig Dispense Start Date End Date Auth. Provider    hydrOXYzine pamoate (VISTARIL) 25 MG Cap Take 1 capsule (25 mg total) by mouth every 6 (six) hours as needed (itching). May cause drowsiness 15 capsule 1/14/2025 -- Roby Bennett ACNP          Follow-up Information       Follow up With Specialties Details Why Contact Info    Marilu Amezquita, FNP Family Medicine In 3 days  [address] [facility] [address]  [phone]      [facility] Emergency Medicine  If symptoms worsen [address] [facility] [address] [phone]             Roby Bennett ACNP  01/14/25 0291

## 2025-03-09 ENCOUNTER — HOSPITAL ENCOUNTER (EMERGENCY)
Facility: HOSPITAL | Age: 70
Discharge: HOME | End: 2025-03-10
Attending: EMERGENCY MEDICINE
Payer: MEDICARE

## 2025-03-09 DIAGNOSIS — D72.819 LEUKOPENIA, UNSPECIFIED TYPE: ICD-10-CM

## 2025-03-09 DIAGNOSIS — R91.8 INFILTRATE OF LOWER LOBE OF RIGHT LUNG PRESENT ON IMAGING STUDY: ICD-10-CM

## 2025-03-09 DIAGNOSIS — F14.10 COCAINE ABUSE: ICD-10-CM

## 2025-03-09 DIAGNOSIS — E86.0 DEHYDRATION: ICD-10-CM

## 2025-03-09 DIAGNOSIS — R73.9 HYPERGLYCEMIA: ICD-10-CM

## 2025-03-09 DIAGNOSIS — L30.9 DERMATITIS: Primary | ICD-10-CM

## 2025-03-09 DIAGNOSIS — R63.4 WEIGHT LOSS: ICD-10-CM

## 2025-03-09 DIAGNOSIS — J10.1 INFLUENZA A: ICD-10-CM

## 2025-03-09 LAB
ACCEPTIBLE SP GR UR QL: 1.03 (ref 1–1.03)
ALBUMIN SERPL-MCNC: 3.6 G/DL (ref 3.4–4.8)
ALBUMIN/GLOB SERPL: 1.1 RATIO (ref 1.1–2)
ALP SERPL-CCNC: 64 UNIT/L (ref 40–150)
ALT SERPL-CCNC: 19 UNIT/L (ref 0–55)
AMPHET UR QL SCN: NEGATIVE
ANION GAP SERPL CALC-SCNC: 13 MEQ/L
APTT PPP: 34.2 SECONDS (ref 23.2–33.7)
AST SERPL-CCNC: 33 UNIT/L (ref 5–34)
BACTERIA #/AREA URNS AUTO: ABNORMAL /HPF
BARBITURATE SCN PRESENT UR: NEGATIVE
BASOPHILS # BLD AUTO: 0.01 X10(3)/MCL
BASOPHILS NFR BLD AUTO: 0.4 %
BENZODIAZ UR QL SCN: NEGATIVE
BILIRUB SERPL-MCNC: 0.3 MG/DL
BILIRUB UR QL STRIP.AUTO: NEGATIVE
BUN SERPL-MCNC: 35.3 MG/DL (ref 8.4–25.7)
CALCIUM SERPL-MCNC: 8.3 MG/DL (ref 8.8–10)
CANNABINOIDS UR QL SCN: NEGATIVE
CHLORIDE SERPL-SCNC: 99 MMOL/L (ref 98–107)
CLARITY UR: CLEAR
CO2 SERPL-SCNC: 24 MMOL/L (ref 23–31)
COCAINE UR QL SCN: POSITIVE
COLOR UR AUTO: YELLOW
CREAT SERPL-MCNC: 0.86 MG/DL (ref 0.72–1.25)
CREAT/UREA NIT SERPL: 41
EOSINOPHIL # BLD AUTO: 0 X10(3)/MCL (ref 0–0.9)
EOSINOPHIL NFR BLD AUTO: 0 %
ERYTHROCYTE [DISTWIDTH] IN BLOOD BY AUTOMATED COUNT: 12 % (ref 11.5–17)
EST. AVERAGE GLUCOSE BLD GHB EST-MCNC: 116.9 MG/DL
ETHANOL SERPL-MCNC: <10 MG/DL
FENTANYL UR QL SCN: NEGATIVE
FLUAV AG UPPER RESP QL IA.RAPID: DETECTED
FLUBV AG UPPER RESP QL IA.RAPID: NOT DETECTED
GFR SERPLBLD CREATININE-BSD FMLA CKD-EPI: >60 ML/MIN/1.73/M2
GLOBULIN SER-MCNC: 3.2 GM/DL (ref 2.4–3.5)
GLUCOSE SERPL-MCNC: 137 MG/DL (ref 82–115)
GLUCOSE UR QL STRIP: ABNORMAL
HBA1C MFR BLD: 5.7 %
HCT VFR BLD AUTO: 36.5 % (ref 42–52)
HGB BLD-MCNC: 12.4 G/DL (ref 14–18)
HGB UR QL STRIP: NEGATIVE
HOLD SPECIMEN: NORMAL
HYALINE CASTS #/AREA URNS LPF: ABNORMAL /LPF
IMM GRANULOCYTES # BLD AUTO: 0 X10(3)/MCL (ref 0–0.04)
IMM GRANULOCYTES NFR BLD AUTO: 0 %
INR PPP: 0.9
KETONES UR QL STRIP: NEGATIVE
LEUKOCYTE ESTERASE UR QL STRIP: NEGATIVE
LYMPHOCYTES # BLD AUTO: 1.04 X10(3)/MCL (ref 0.6–4.6)
LYMPHOCYTES NFR BLD AUTO: 39.8 %
MCH RBC QN AUTO: 30.3 PG (ref 27–31)
MCHC RBC AUTO-ENTMCNC: 34 G/DL (ref 33–36)
MCV RBC AUTO: 89.2 FL (ref 80–94)
MDMA UR QL SCN: NEGATIVE
MONOCYTES # BLD AUTO: 0.37 X10(3)/MCL (ref 0.1–1.3)
MONOCYTES NFR BLD AUTO: 14.2 %
MUCOUS THREADS URNS QL MICRO: ABNORMAL /LPF
NEUTROPHILS # BLD AUTO: 1.19 X10(3)/MCL (ref 2.1–9.2)
NEUTROPHILS NFR BLD AUTO: 45.6 %
NITRITE UR QL STRIP: NEGATIVE
NRBC BLD AUTO-RTO: 0 %
OPIATES UR QL SCN: NEGATIVE
PCP UR QL: NEGATIVE
PH UR STRIP: 5.5 [PH]
PH UR: 5.5 [PH] (ref 3–11)
PLATELET # BLD AUTO: 154 X10(3)/MCL (ref 130–400)
PMV BLD AUTO: 9.8 FL (ref 7.4–10.4)
POCT GLUCOSE: 229 MG/DL (ref 70–110)
POTASSIUM SERPL-SCNC: 3.2 MMOL/L (ref 3.5–5.1)
PROT SERPL-MCNC: 6.8 GM/DL (ref 5.8–7.6)
PROT UR QL STRIP: ABNORMAL
PROTHROMBIN TIME: 12.7 SECONDS (ref 11.4–14)
RBC # BLD AUTO: 4.09 X10(6)/MCL (ref 4.7–6.1)
RBC #/AREA URNS AUTO: ABNORMAL /HPF
SARS-COV-2 RNA RESP QL NAA+PROBE: NOT DETECTED
SODIUM SERPL-SCNC: 136 MMOL/L (ref 136–145)
SP GR UR STRIP.AUTO: 1.03 (ref 1–1.03)
SQUAMOUS #/AREA URNS LPF: ABNORMAL /HPF
UROBILINOGEN UR STRIP-ACNC: ABNORMAL
WBC # BLD AUTO: 2.61 X10(3)/MCL (ref 4.5–11.5)
WBC #/AREA URNS AUTO: ABNORMAL /HPF

## 2025-03-09 PROCEDURE — 81001 URINALYSIS AUTO W/SCOPE: CPT | Performed by: EMERGENCY MEDICINE

## 2025-03-09 PROCEDURE — 80053 COMPREHEN METABOLIC PANEL: CPT | Performed by: EMERGENCY MEDICINE

## 2025-03-09 PROCEDURE — 25500020 PHARM REV CODE 255

## 2025-03-09 PROCEDURE — 93005 ELECTROCARDIOGRAM TRACING: CPT

## 2025-03-09 PROCEDURE — 85025 COMPLETE CBC W/AUTO DIFF WBC: CPT | Performed by: EMERGENCY MEDICINE

## 2025-03-09 PROCEDURE — 83036 HEMOGLOBIN GLYCOSYLATED A1C: CPT | Performed by: EMERGENCY MEDICINE

## 2025-03-09 PROCEDURE — 82077 ASSAY SPEC XCP UR&BREATH IA: CPT | Performed by: EMERGENCY MEDICINE

## 2025-03-09 PROCEDURE — 80307 DRUG TEST PRSMV CHEM ANLYZR: CPT | Performed by: EMERGENCY MEDICINE

## 2025-03-09 PROCEDURE — 85730 THROMBOPLASTIN TIME PARTIAL: CPT | Performed by: EMERGENCY MEDICINE

## 2025-03-09 PROCEDURE — 99285 EMERGENCY DEPT VISIT HI MDM: CPT | Mod: 25

## 2025-03-09 PROCEDURE — 85610 PROTHROMBIN TIME: CPT | Performed by: EMERGENCY MEDICINE

## 2025-03-09 PROCEDURE — 0240U COVID/FLU A&B PCR: CPT | Performed by: EMERGENCY MEDICINE

## 2025-03-09 RX ORDER — SODIUM CHLORIDE, SODIUM LACTATE, POTASSIUM CHLORIDE, CALCIUM CHLORIDE 600; 310; 30; 20 MG/100ML; MG/100ML; MG/100ML; MG/100ML
1000 INJECTION, SOLUTION INTRAVENOUS CONTINUOUS
Status: DISCONTINUED | OUTPATIENT
Start: 2025-03-10 | End: 2025-03-10 | Stop reason: HOSPADM

## 2025-03-09 RX ADMIN — IOHEXOL 100 ML: 350 INJECTION, SOLUTION INTRAVENOUS at 10:03

## 2025-03-10 VITALS
SYSTOLIC BLOOD PRESSURE: 152 MMHG | OXYGEN SATURATION: 100 % | WEIGHT: 114 LBS | RESPIRATION RATE: 18 BRPM | BODY MASS INDEX: 17.89 KG/M2 | HEIGHT: 67 IN | HEART RATE: 75 BPM | DIASTOLIC BLOOD PRESSURE: 87 MMHG | TEMPERATURE: 98 F

## 2025-03-10 LAB
OHS QRS DURATION: 90 MS
OHS QTC CALCULATION: 448 MS

## 2025-03-10 PROCEDURE — 63600175 PHARM REV CODE 636 W HCPCS: Performed by: EMERGENCY MEDICINE

## 2025-03-10 PROCEDURE — 96360 HYDRATION IV INFUSION INIT: CPT

## 2025-03-10 RX ADMIN — SODIUM CHLORIDE, POTASSIUM CHLORIDE, SODIUM LACTATE AND CALCIUM CHLORIDE 1000 ML: 600; 310; 30; 20 INJECTION, SOLUTION INTRAVENOUS at 12:03

## 2025-03-10 NOTE — DISCHARGE INSTRUCTIONS
You have the flu, you are mildly dehydrated, and there is a questionable area on your right lung by CT scan.      Drink plenty of fluids, rest, see your primary care provider soon for a recheck to continue workup on all of these issues.    Stop doing cocaine and go to the substance abuse treatment program of your choice.

## 2025-03-10 NOTE — ED PROVIDER NOTES
"Encounter Date: 3/9/2025       History     Chief Complaint   Patient presents with    Fatigue     Pt states he has been feeling fatigue with body aches "for a while" states he has been having dark urine for the past 3-4 days.      Here with his wife for various complaints.  Somewhat vague about time course, but he has been losing weight possibly as much as 40 lb or more in recent months or longer, unintentional.  Recent generalized fatigue and dry skin, picking at his skin because of a stinging sensation, using tweezers at times for what sounds like hair follicle irritation or similar.  He denies pruritus but states his urine has been getting dark in recent days.  He has a general sense of aching all over without known fever, chills, sweats, chest pain, dyspnea, dysuria, or other complaints.  His wife confirms that he has had significant weight loss.  He reluctantly admits to cocaine but denies alcohol or other drugs, in particular denies methamphetamine.  Underlying history of hypertension and diabetes, states he is taking metformin and blood pressure medicine as prescribed.  No other specific complaints.    The history is provided by the patient and the spouse. No  was used.     Review of patient's allergies indicates:  No Known Allergies  Past Medical History:   Diagnosis Date    Chronic low back pain with left-sided sciatica     Diabetes mellitus     HTN (hypertension)      Past Surgical History:   Procedure Laterality Date    COLONOSCOPY N/A 4/10/2024    Procedure: COLON;  Surgeon: Ravi Petit MD;  Location: [facility] ENDOSCOPY;  Service: Gastroenterology;  Laterality: N/A;    EGD, WITH CLOSED BIOPSY  4/9/2024    Procedure: EGD, WITH CLOSED BIOPSY;  Surgeon: Ravi Petit MD;  Location: [facility] ENDOSCOPY;  Service: Gastroenterology;;    ESOPHAGOGASTRODUODENOSCOPY N/A 4/9/2024    Procedure: EGD;  Surgeon: Ravi Petit MD;  Location: [facility] ENDOSCOPY;  Service: " Gastroenterology;  Laterality: N/A;    HIP SURGERY Left     LAPAROSCOPIC CHOLECYSTECTOMY N/A 4/22/2024    Procedure: CHOLECYSTECTOMY, LAPAROSCOPIC;  Surgeon: Demetrice Gardner MD;  Location: [facility];  Service: General;  Laterality: N/A;    ORIF HIP FRACTURE Right     >10 years     Family History   Problem Relation Name Age of Onset    No Known Problems Mother      Diabetes Father       Social History[1]  Review of Systems   Constitutional:  Positive for unexpected weight change.        See HPI   Genitourinary:         See HPI   Skin:  Positive for rash.        See HPI       Physical Exam     Initial Vitals [03/09/25 2008]   BP Pulse Resp Temp SpO2   (!) 175/91 74 20 97.9 °F (36.6 °C) 99 %      MAP       --         Physical Exam    Nursing note and vitals reviewed.  Constitutional: He appears well-developed. He is not diaphoretic. No distress.   Underweight, mostly preserved muscle mass generally but extremely low body fat, appears at least 10 or 20% below ideal body weight.  Does not appear grossly ill.   HENT:   Head: Normocephalic and atraumatic. Mouth/Throat: Oropharynx is clear and moist. No oropharyngeal exudate.   Eyes: Conjunctivae and EOM are normal. Pupils are equal, round, and reactive to light. Right eye exhibits no discharge. Left eye exhibits no discharge. No scleral icterus.   Neck: Neck supple. No thyromegaly present. No tracheal deviation present. No JVD present.   Normal range of motion.  Cardiovascular:  Normal rate, regular rhythm and normal heart sounds.     Exam reveals no gallop and no friction rub.       No murmur heard.  Pulmonary/Chest: Breath sounds normal. No respiratory distress. He has no wheezes. He has no rhonchi. He has no rales. He exhibits no tenderness.   Abdominal: Abdomen is soft. Bowel sounds are normal. He exhibits no distension and no mass. There is no abdominal tenderness. There is no rebound and no guarding.   Musculoskeletal:         General: No tenderness or edema. Normal  range of motion.      Cervical back: Normal range of motion and neck supple.     Lymphadenopathy:     He has no cervical adenopathy.   Neurological: He is alert and oriented to person, place, and time. He has normal strength. No cranial nerve deficit.   Skin: Skin is warm and dry. Rash noted. No erythema.   Mild diffuse dry skin predominantly on the extremities with evidence of excoriation and skin picking particularly on the dorsal aspects of the forearms, no sign of secondary infection.   Psychiatric: He has a normal mood and affect. His behavior is normal. Judgment and thought content normal.         ED Course   Procedures  Labs Reviewed   COMPREHENSIVE METABOLIC PANEL - Abnormal       Result Value    Sodium 136      Potassium 3.2 (*)     Chloride 99      CO2 24      Glucose 137 (*)     Blood Urea Nitrogen 35.3 (*)     Creatinine 0.86      Calcium 8.3 (*)     Protein Total 6.8      Albumin 3.6      Globulin 3.2      Albumin/Globulin Ratio 1.1      Bilirubin Total 0.3      ALP 64      ALT 19      AST 33      eGFR >60      Anion Gap 13.0      BUN/Creatinine Ratio 41     URINALYSIS, REFLEX TO URINE CULTURE - Abnormal    Color, UA Yellow      Appearance, UA Clear      Specific Gravity, UA 1.030      pH, UA 5.5      Protein, UA 1+ (*)     Glucose, UA 1+ (*)     Ketones, UA Negative      Blood, UA Negative      Bilirubin, UA Negative      Urobilinogen, UA 1+ (*)     Nitrites, UA Negative      Leukocyte Esterase, UA Negative      RBC, UA 0-5      WBC, UA 0-5      Bacteria, UA None Seen      Squamous Epithelial Cells, UA Trace (*)     Mucous, UA Trace (*)     Hyaline Casts, UA None Seen     DRUG SCREEN, URINE (BEAKER) - Abnormal    Amphetamines, Urine Negative      Barbiturates, Urine Negative      Benzodiazepine, Urine Negative      Cannabinoids, Urine Negative      Cocaine, Urine Positive (*)     Fentanyl, Urine Negative      MDMA, Urine Negative      Opiates, Urine Negative      Phencyclidine, Urine Negative      pH,  Urine 5.5      Specific Gravity, Urine Auto 1.030      Narrative:     Cut off concentrations:    Amphetamines - 1000 ng/ml  Barbiturates - 200 ng/ml  Benzodiazepine - 200 ng/ml  Cannabinoids (THC) - 50 ng/ml  Cocaine - 300 ng/ml  Fentanyl - 1.0 ng/ml  MDMA - 500 ng/ml  Opiates - 300 ng/ml   Phencyclidine (PCP) - 25 ng/ml    Specimen submitted for drug analysis and tested for pH and specific gravity in order to evaluate sample integrity. Suspect tampering if specific gravity is <1.003 and/or pH is not within the range of 4.5 - 8.0  False negatives may result form substances such as bleach added to urine.  False positives may result for the presence of a substance with similar chemical structure to the drug or its metabolite.    This test provides only a PRELIMINARY analytical test result. A more specific alternate chemical method must be used in order to obtain a confirmed analytical result. Gas chromatography/mass spectrometry (GC/MS) is the preferred confirmatory method. Other chemical confirmation methods are available. Clinical consideration and professional judgement should be applied to any drug of abuse test result, particularly when preliminary positive results are used.    Positive results will be confirmed only at the physicians request. Unconfirmed screening results are to be used only for medical purposes (treatment).        APTT - Abnormal    PTT 34.2 (*)    COVID/FLU A&B PCR - Abnormal    Influenza A PCR Detected (*)     Influenza B PCR Not Detected      SARS-CoV-2 PCR Not Detected      Narrative:     The Xpert Xpress SARS-CoV-2/FLU/RSV plus is a rapid, multiplexed real-time PCR test intended for the simultaneous qualitative detection and differentiation of SARS-CoV-2, Influenza A, Influenza B, and respiratory syncytial virus (RSV) viral RNA in either nasopharyngeal swab or nasal swab specimens.         CBC WITH DIFFERENTIAL - Abnormal    WBC 2.61 (*)     RBC 4.09 (*)     Hgb 12.4 (*)     Hct 36.5 (*)      MCV 89.2      MCH 30.3      MCHC 34.0      RDW 12.0      Platelet 154      MPV 9.8      Neut % 45.6      Lymph % 39.8      Mono % 14.2      Eos % 0.0      Basophil % 0.4      Imm Grans % 0.0      Neut # 1.19 (*)     Lymph # 1.04      Mono # 0.37      Eos # 0.00      Baso # 0.01      Imm Gran # 0.00      NRBC% 0.0     POCT GLUCOSE - Abnormal    POCT Glucose 229 (*)    ALCOHOL,MEDICAL (ETHANOL) - Normal    Ethanol Level <10.0     PROTIME-INR - Normal    PT 12.7      INR 0.9      Narrative:     Protimes are used to monitor anticoagulant agents such as warfarin. PT INR values are based on the current patient normal mean and the GUILLAUME value for the specific instrument reagent used.  **Routine theraputic target values for the INR are 2.0-3.0**   CBC W/ AUTO DIFFERENTIAL    Narrative:     The following orders were created for panel order CBC auto differential.  Procedure                               Abnormality         Status                     ---------                               -----------         ------                     CBC with Differential[[id number]]       Abnormal            Final result                 Please view results for these tests on the individual orders.   HEMOGLOBIN A1C    Hemoglobin A1c 5.7      Estimated Average Glucose 116.9     GOLD TOP HOLD    Extra Tube Hold for add-ons.     EXTRA TUBES    Narrative:     The following orders were created for panel order EXTRA TUBES.  Procedure                               Abnormality         Status                     ---------                               -----------         ------                     Light Green Top Hold[[id number]]                                                       Gold Top Hold[[id number]]                                   Final result               Pink Top Hold[[id number]]                                                                Please view results for these tests on the individual orders.   LIGHT GREEN TOP HOLD   PINK TOP  HOLD     EKG Readings: (Independently Interpreted)   Initial Reading: No STEMI. Rhythm: Normal Sinus Rhythm. Heart Rate: 68. Ectopy: No Ectopy. Conduction: Normal. ST Segments: Normal ST Segments. T Waves: Normal. Axis: Normal. Clinical Impression: Normal Sinus Rhythm       Imaging Results              CT Chest Abdomen Pelvis With IV Contrast (XPD) NO Oral Contrast (Final result)  Result time 03/09/25 22:21:48      Final result by Richard Patricio MD (03/09/25 22:21:48)                   Impression:      Dense vascular calcification.    Questionable cholelithiasis without evidence of acute cholecystitis    Density in the right middle and lower lobe most likely inflammatory or infectious this should be followed until clear      Electronically signed by: Richard Patricio MD  Date:    03/09/2025  Time:    22:21               Narrative:    EXAMINATION:  CT CHEST ABDOMEN PELVIS WITH IV CONTRAST (XPD)    CLINICAL HISTORY:  Weight loss, unintended;    TECHNIQUE:  Low dose axial images, sagittal and coronal reformations were obtained from the thoracic inlet to the pubic symphysis following the IV administration of 100 mL of Omnipaque 350    Automatic exposure control (AEC) was utilized for dose reduction.    Dose: 178 mGycm    COMPARISON:  None    FINDINGS:  Mediastinum reveals no significant adenopathy.  The thoracic aorta appears intact.  There is faint density diffusely in the right middle lobe and to a lesser extent the right lower lobe this is most likely inflammatory this should be followed until clear.    Liver appears normal.  Spleen appears normal.  Pancreas appears normal.  There is questionable cholelithiasis without evidence of acute cholecystitis.  The adrenals are not enlarged.  Kidneys appear normal.  Aorta is densely calcified without an aneurysm present there is a moderate amount of stool in the colon.  There is a compression screw in the left hip.                                       X-Ray Chest PA And  Lateral (Final result)  Result time 03/09/25 22:25:39      Final result by Richard Patricio MD (03/09/25 22:25:39)                   Impression:      No acute abnormality.      Electronically signed by: Richard Patricio MD  Date:    03/09/2025  Time:    22:25               Narrative:    EXAMINATION:  XR CHEST PA AND LATERAL    CLINICAL HISTORY:  Chest Pain;    TECHNIQUE:  PA and lateral views of the chest were performed.    COMPARISON:  08/19/2024    FINDINGS:  The lungs are clear, with normal appearance of pulmonary vasculature and no pleural effusion or pneumothorax.    The cardiac silhouette is normal in size. The hilar and mediastinal contours are unremarkable.    Bones are intact. There is vascular calcification noted                                       Medications   lactated ringers bolus 1,000 mL (1,000 mLs Intravenous New Bag 3/10/25 0002)   lactated ringers infusion (1,000 mLs Intravenous New Bag 3/10/25 0002)   iohexoL (OMNIPAQUE 350) 350 mg iodine/mL injection (100 mLs  Given 3/9/25 2208)       11:54 PM Data reveal volume depletion, cocaine, abnormal pulmonary findings by CT as documented not visible by chest x-ray, leukopenia, Influenza A.  Consult Internal Medicine for assistance, IV hydration.      12:39 AM Appreciate Internal Medicine assistance, outpatient workup, discharge after IV fluid bolus with the following instructions:      You have the flu, you are mildly dehydrated, and there is a questionable area on your right lung by CT scan.      Drink plenty of fluids, rest, see your primary care provider soon for a recheck to continue workup on all of these issues.    Stop doing cocaine and go to the substance abuse treatment program of your choice.          Medical Decision Making  Patient with multiple in scattered complaints, weight loss, body aches, ongoing cocaine abuse, nonspecific dermatitis.  Thorough ER evaluation reveals multiple diagnoses as listed but none in need acute admission, short  term follow up with primary care as recommended.    Problems Addressed:  Dehydration: acute illness or injury  Influenza A: acute illness or injury  Weight loss: chronic illness or injury    Amount and/or Complexity of Data Reviewed  Labs: ordered. Decision-making details documented in ED Course.  Radiology: ordered. Decision-making details documented in ED Course.  ECG/medicine tests: ordered and independent interpretation performed. Decision-making details documented in ED Course.    Risk  Prescription drug management.  Decision regarding hospitalization.      Additional MDM:   Differential Diagnosis:   Substance abuse, dehydration, malignancy, other causes for weight loss among many others                                    Clinical Impression:  Final diagnoses:  [R63.4] Weight loss  [L30.9] Dermatitis (Primary)  [R73.9] Hyperglycemia  [R91.8] Infiltrate of lower lobe of right lung present on imaging study  [D72.819] Leukopenia, unspecified type  [E86.0] Dehydration  [J10.1] Influenza A  [F14.10] Cocaine abuse          ED Disposition Condition    Discharge Stable          ED Prescriptions    None       Follow-up Information       Follow up With Specialties Details Why Contact Info    Marilu Amezquita, P Family Medicine Schedule an appointment as soon as possible for a visit   [facility] [facility] [address]  [phone]      [facility] Emergency Medicine  As needed [facility] [facility] [address] [phone]                 [1]   Social History  Tobacco Use    Smoking status: Every Day     Current packs/day: 0.50     Average packs/day: 0.5 packs/day for 53.2 years (26.6 ttl pk-yrs)     Types: Cigarettes     Start date: 1/1/1972    Smokeless tobacco: Never   Substance Use Topics    Alcohol use: Not Currently     Comment: occ    Drug use: Never        Justin Kemp MD  03/10/25 0041

## 2025-03-10 NOTE — CONSULTS
"[facility] History and Physical     Date of Admit: 3/9/2025    Chief Complaint     Fatigue (Pt states he has been feeling fatigue with body aches "for a while" states he has been having dark urine for the past 3-4 days. )   for 4 months    Subjective:      History of Present Illness:  Heath Holder is a 69 y.o. male who has a PMH of HTN and T2DM. The patient presented to [facility] ED on 3/9/2025 with a primary complaint of weight loss and LE aches bilaterally for the past 4 months. Patient reports that he has felt some mild malaise, and has had non-intentional weight loss of 20 lbs over the past 4 months. He denies any night sweats, fevers/chills, lymphadenopathy. Patient has no provoking factors. In ED, patient has CBC showing mild pancytopenia, and is FluA+, also had CT chest with mild R middle and lower lobe densities. Patient has no respiratory symptoms and reports that he does not want to stay in hospital. Patient is not, and does not appear acutely toxic/ill and is counseled that if he wishes to leave, should have close follow up with PCP for low cell counts and consider repeat imaging of chest to rule out non-infectious causes of findings. Patient likely has viral PNA, but has no  respiratory symptoms and no acute complaints. Patient will be discharged from ED with close PCP follow up. Can consider tamiflu, but has not acute symptoms so day 1 unsure.      Past Medical History:  Past Medical History:   Diagnosis Date    Chronic low back pain with left-sided sciatica     Diabetes mellitus     HTN (hypertension)        Past Surgical History:  Past Surgical History:   Procedure Laterality Date    COLONOSCOPY N/A 4/10/2024    Procedure: COLON;  Surgeon: Ravi Petit MD;  Location: [facility] ENDOSCOPY;  Service: Gastroenterology;  Laterality: N/A;    EGD, WITH CLOSED BIOPSY  4/9/2024    Procedure: EGD, WITH CLOSED BIOPSY;  Surgeon: Ravi Petit MD;  Location: [facility] ENDOSCOPY;  Service: " Gastroenterology;;    ESOPHAGOGASTRODUODENOSCOPY N/A 4/9/2024    Procedure: EGD;  Surgeon: Ravi Petit MD;  Location: [facility] ENDOSCOPY;  Service: Gastroenterology;  Laterality: N/A;    HIP SURGERY Left     LAPAROSCOPIC CHOLECYSTECTOMY N/A 4/22/2024    Procedure: CHOLECYSTECTOMY, LAPAROSCOPIC;  Surgeon: Demetrice Gardner MD;  Location: [facility] OR;  Service: General;  Laterality: N/A;    ORIF HIP FRACTURE Right     >10 years       Allergies:  Review of patient's allergies indicates:  No Known Allergies    Home Medications:  Prior to Admission medications    Medication Sig Start Date End Date Taking? Authorizing Provider   amLODIPine (NORVASC) 10 MG tablet Take 1 tablet (10 mg total) by mouth once daily. 4/13/24 4/13/25  Bo Waldron MD   diclofenac (VOLTAREN) 75 MG EC tablet TAKE 1 TABLET BY MOUTH 2 TIMES DAILY AS NEEDED (PAIN). 11/20/24   Marilu Amezquita FNP   EPCLUSA 400-100 mg Tab Take 1 tablet by mouth once daily.  Patient not taking: Reported on 11/27/2024    Provider, Historical   gabapentin (NEURONTIN) 300 MG capsule Take 1 capsule (300 mg total) by mouth 2 (two) times daily. 8/19/24 10/18/24  Tyler Hurt MD   hydrOXYzine pamoate (VISTARIL) 25 MG Cap Take 1 capsule (25 mg total) by mouth every 6 (six) hours as needed (itching). May cause drowsiness 1/14/25   Roby Bennett ACNP   lisinopriL (PRINIVIL,ZESTRIL) 40 MG tablet Take 1 tablet (40 mg total) by mouth once daily. 6/17/24 6/17/25  Marilu Amezquita FNP   metFORMIN (GLUCOPHAGE) 1000 MG tablet Take 1 tablet (1,000 mg total) by mouth 2 (two) times daily with meals. 6/17/24   Marilu Amezquita FNP   oxyCODONE-acetaminophen (PERCOCET) 5-325 mg per tablet Take 1 tablet by mouth every 6 (six) hours as needed for Pain.  Patient not taking: Reported on 11/27/2024 7/27/24   Gayathri Lane PA   pantoprazole (PROTONIX) 40 MG tablet Take 1 tablet (40 mg total) by mouth 2 (two) times daily. 4/12/24 6/17/24  Bo Waldron MD  "  polyethylene glycol (GLYCOLAX) 17 gram PwPk Take 17 g by mouth once daily. 4/30/24   Alberto Croft MD   sucralfate (CARAFATE) 1 gram tablet Take 1 tablet (1 g total) by mouth 4 (four) times daily. 4/12/24   Bo Waldron MD   traMADoL (ULTRAM) 50 mg tablet Take 1 tablet (50 mg total) by mouth every 6 (six) hours as needed for Pain.  Patient not taking: Reported on 6/17/2024 4/16/24   Alberto Croft MD       Family History:  Family History   Problem Relation Name Age of Onset    No Known Problems Mother      Diabetes Father         Social History:  Social History[1]    Review of Systems:  Review of Systems   Constitutional:  Positive for weight loss. Negative for chills, diaphoresis, fever and malaise/fatigue.   Eyes:  Negative for blurred vision.   Respiratory:  Negative for cough, hemoptysis, sputum production, shortness of breath and wheezing.    Cardiovascular:  Negative for chest pain, palpitations, orthopnea, leg swelling and PND.   Gastrointestinal:  Negative for abdominal pain, blood in stool, constipation, diarrhea, heartburn, melena, nausea and vomiting.   Genitourinary:  Negative for dysuria and urgency.   Musculoskeletal:  Negative for myalgias.        L extremity aches bilaterally   Skin:  Negative for itching and rash.   Neurological:  Negative for dizziness, tingling, focal weakness, loss of consciousness, weakness and headaches.   Psychiatric/Behavioral:  Negative for depression.               Objective:   Last 24 Hour Vital Signs:  Vitals  BP: (!) 152/87  Temp: 98.2 °F (36.8 °C)  Temp Source: Oral  Pulse: 75  Resp: 18  SpO2: 100 %  Height: 5' 7" (170.2 cm)  Weight: 51.7 kg (114 lb)    Physical Examination:  General: Patient resting comfortably, in no acute distress appears cachectic, but has no complaints and is pleasant  Eye: pupils equal, EOMI, clear conjunctiva, eyelids normal  HENT: Head-normocephalic and atraumatic  Neck: full range of motion, no thyromegaly or lymphadenopathy, " trachea midline, supple  Respiratory: clear to auscultation bilaterally without wheezes, rales, rhonchi  Cardiovascular: regular rate and rhythm without murmurs.  No gallops or rubs, no JVD.  Capillary refill within normal limits.  Gastrointestinal: soft, non-tender, non-distended with normal bowel sounds, without masses to palpation  Genitourinary: no CVA tenderness to palpation  Musculoskeletal: full range of motion of all extremities/spine without limitation or discomfort  Integumentary: no rashes or skin lesions present  Neurologic: no signs of peripheral neurological deficit, motor/sensory function intact  Psychiatric:  alert and oriented, cognitive function intact, cooperative with exam      Laboratory:  Most Recent Data:  Most Recent Data:  CBC:   Lab Results   Component Value Date    WBC 2.61 (L) 03/09/2025    HGB 12.4 (L) 03/09/2025    HCT 36.5 (L) 03/09/2025     03/09/2025    MCV 89.2 03/09/2025    RDW 12.0 03/09/2025     WBC Differential:   Recent Labs   Lab 03/09/25 2117   WBC 2.61*   HGB 12.4*   HCT 36.5*      MCV 89.2     BMP:   Lab Results   Component Value Date     03/09/2025    K 3.2 (L) 03/09/2025    CL 99 03/09/2025    CO2 24 03/09/2025    BUN 35.3 (H) 03/09/2025    CREATININE 0.86 03/09/2025    CALCIUM 8.3 (L) 03/09/2025    MG 1.60 12/26/2024    PHOS 1.8 (L) 04/30/2024     LFTs:   Lab Results   Component Value Date    ALBUMIN 3.6 03/09/2025    BILITOT 0.3 03/09/2025    AST 33 03/09/2025    ALKPHOS 64 03/09/2025    ALT 19 03/09/2025    GGT 26 09/30/2024     Coags:   Lab Results   Component Value Date    INR 0.9 03/09/2025     FLP:   Lab Results   Component Value Date    CHOL 192 09/30/2024    HDL 48 09/30/2024    TRIG 142 (H) 09/30/2024     DM:   Lab Results   Component Value Date    HGBA1C 5.7 03/09/2025    HGBA1C 5.6 09/30/2024    HGBA1C 9.3 (H) 03/18/2024    CREATININE 0.86 03/09/2025     Thyroid:   Lab Results   Component Value Date    TSH 2.777 09/30/2024     Anemia:    Lab Results   Component Value Date    IRON 77 12/26/2024    TIBC 272 12/26/2024    FERRITIN 530.58 (H) 12/26/2024     Cardiac:   Lab Results   Component Value Date    TROPONINI <0.010 07/21/2024    BNP 28.1 08/19/2024     Urinalysis:   Lab Results   Component Value Date    COLORU Yellow 03/09/2025    NITRITE Negative 03/09/2025    UROBILINOGEN 1+ (A) 03/09/2025    WBCUA 0-5 03/09/2025       Radiology:  Imaging Results              CT Chest Abdomen Pelvis With IV Contrast (XPD) NO Oral Contrast (Final result)  Result time 03/09/25 22:21:48      Final result by Richard Patricio MD (03/09/25 22:21:48)                   Impression:      Dense vascular calcification.    Questionable cholelithiasis without evidence of acute cholecystitis    Density in the right middle and lower lobe most likely inflammatory or infectious this should be followed until clear      Electronically signed by: Richard Patricio MD  Date:    03/09/2025  Time:    22:21               Narrative:    EXAMINATION:  CT CHEST ABDOMEN PELVIS WITH IV CONTRAST (XPD)    CLINICAL HISTORY:  Weight loss, unintended;    TECHNIQUE:  Low dose axial images, sagittal and coronal reformations were obtained from the thoracic inlet to the pubic symphysis following the IV administration of 100 mL of Omnipaque 350    Automatic exposure control (AEC) was utilized for dose reduction.    Dose: 178 mGycm    COMPARISON:  None    FINDINGS:  Mediastinum reveals no significant adenopathy.  The thoracic aorta appears intact.  There is faint density diffusely in the right middle lobe and to a lesser extent the right lower lobe this is most likely inflammatory this should be followed until clear.    Liver appears normal.  Spleen appears normal.  Pancreas appears normal.  There is questionable cholelithiasis without evidence of acute cholecystitis.  The adrenals are not enlarged.  Kidneys appear normal.  Aorta is densely calcified without an aneurysm present there is a moderate amount  of stool in the colon.  There is a compression screw in the left hip.                                       X-Ray Chest PA And Lateral (Final result)  Result time 03/09/25 22:25:39      Final result by Richard Patricio MD (03/09/25 22:25:39)                   Impression:      No acute abnormality.      Electronically signed by: Richard Patricio MD  Date:    03/09/2025  Time:    22:25               Narrative:    EXAMINATION:  XR CHEST PA AND LATERAL    CLINICAL HISTORY:  Chest Pain;    TECHNIQUE:  PA and lateral views of the chest were performed.    COMPARISON:  08/19/2024    FINDINGS:  The lungs are clear, with normal appearance of pulmonary vasculature and no pleural effusion or pneumothorax.    The cardiac silhouette is normal in size. The hilar and mediastinal contours are unremarkable.    Bones are intact. There is vascular calcification noted                                           Assessment & Plan:     InfluenzaA+  - patient without acute symptoms or acute SOB, fevers, or cough  - patient with chronic complaints of 20lbs weight loss over 4 months, and some lower extremity aches for the same period of time.  - no loss of strength or back pain or problems, and denies any bowel or bladder symptoms  - patient has not been febrile and has VSS  - save for mild pancytopenia, FluA+, and UA with cocaine, labs wnl  - CT chest/abdomen/pelvis with mild density of R middle and lower lobes, likely mild infiltrates due to FluA, patient remains asymptomatic of respiratory complaints, reports feeling well other than weight loss and 4 month hx of lower extremity aches  - patient strongly desires to leave  - discusses with patient and advised to have close follow up with PCP, has appointment with PCP and told to address low cell counts, and consider repeat imaging to rule out non-infectious causes of chest CT findings.     Mild Pancytopenia  - patient with Hgb 12, WBC 2.6, Plt 150  - patient has cell counts lower than  baseline  - has chronic symptoms of weight loss over past 4 months, about 20 lbs  - patient had CT chest, abdomen, pelvis without malignant associated findings.   - patient has follow up with PCP in 1 week and would likely benefit from workup of low counts of all cell lines, albeit mild.  - no acute findings that suggest need for hospitalization  - patient desires to leave        CODE STATUS: Full Code      Dispo: Will discharge patient with advised close follow up with PCP for blood count work up, patient FluA+ without acute symptoms, can consider tamiflu, will defer to ED      Pavan Rice MD  [facility] Internal Medicine  HO-1        [1]   Social History  Tobacco Use    Smoking status: Every Day     Current packs/day: 0.50     Average packs/day: 0.5 packs/day for 53.2 years (26.6 ttl pk-yrs)     Types: Cigarettes     Start date: 1/1/1972    Smokeless tobacco: Never   Substance Use Topics    Alcohol use: Not Currently     Comment: occ    Drug use: Never

## 2025-05-30 DIAGNOSIS — I10 PRIMARY HYPERTENSION: ICD-10-CM

## 2025-05-30 RX ORDER — LISINOPRIL 40 MG/1
40 TABLET ORAL DAILY
Qty: 30 TABLET | Refills: 2 | Status: SHIPPED | OUTPATIENT
Start: 2025-05-30 | End: 2026-05-30

## 2025-06-02 ENCOUNTER — HOSPITAL ENCOUNTER (EMERGENCY)
Facility: HOSPITAL | Age: 70
Discharge: HOME | End: 2025-06-02
Attending: FAMILY MEDICINE
Payer: MEDICARE

## 2025-06-02 VITALS
SYSTOLIC BLOOD PRESSURE: 165 MMHG | BODY MASS INDEX: 19.66 KG/M2 | TEMPERATURE: 99 F | HEART RATE: 76 BPM | RESPIRATION RATE: 20 BRPM | OXYGEN SATURATION: 100 % | DIASTOLIC BLOOD PRESSURE: 87 MMHG | WEIGHT: 122.31 LBS | HEIGHT: 66 IN

## 2025-06-02 DIAGNOSIS — K59.00 CONSTIPATION, UNSPECIFIED CONSTIPATION TYPE: Primary | ICD-10-CM

## 2025-06-02 DIAGNOSIS — R10.9 ABDOMINAL PAIN: ICD-10-CM

## 2025-06-02 LAB
ALBUMIN SERPL-MCNC: 3.5 G/DL (ref 3.4–4.8)
ALBUMIN/GLOB SERPL: 1 RATIO (ref 1.1–2)
ALP SERPL-CCNC: 94 UNIT/L (ref 40–150)
ALT SERPL-CCNC: 11 UNIT/L (ref 0–55)
ANION GAP SERPL CALC-SCNC: 7 MEQ/L
AST SERPL-CCNC: 19 UNIT/L (ref 11–45)
BACTERIA #/AREA URNS AUTO: ABNORMAL /HPF
BASOPHILS # BLD AUTO: 0.03 X10(3)/MCL
BASOPHILS NFR BLD AUTO: 0.6 %
BILIRUB SERPL-MCNC: 0.4 MG/DL
BILIRUB UR QL STRIP.AUTO: NEGATIVE
BUN SERPL-MCNC: 17 MG/DL (ref 8.4–25.7)
CALCIUM SERPL-MCNC: 9.3 MG/DL (ref 8.8–10)
CHLORIDE SERPL-SCNC: 111 MMOL/L (ref 98–107)
CLARITY UR: CLEAR
CO2 SERPL-SCNC: 26 MMOL/L (ref 23–31)
COLOR UR AUTO: ABNORMAL
CREAT SERPL-MCNC: 0.71 MG/DL (ref 0.72–1.25)
CREAT/UREA NIT SERPL: 24
EOSINOPHIL # BLD AUTO: 0.08 X10(3)/MCL (ref 0–0.9)
EOSINOPHIL NFR BLD AUTO: 1.5 %
ERYTHROCYTE [DISTWIDTH] IN BLOOD BY AUTOMATED COUNT: 12.5 % (ref 11.5–17)
GFR SERPLBLD CREATININE-BSD FMLA CKD-EPI: >60 ML/MIN/1.73/M2
GLOBULIN SER-MCNC: 3.4 GM/DL (ref 2.4–3.5)
GLUCOSE SERPL-MCNC: 134 MG/DL (ref 82–115)
GLUCOSE UR QL STRIP: ABNORMAL
HCT VFR BLD AUTO: 37.7 % (ref 42–52)
HGB BLD-MCNC: 11.9 G/DL (ref 14–18)
HGB UR QL STRIP: NEGATIVE
HOLD SPECIMEN: NORMAL
HYALINE CASTS #/AREA URNS LPF: ABNORMAL /LPF
IMM GRANULOCYTES # BLD AUTO: 0.01 X10(3)/MCL (ref 0–0.04)
IMM GRANULOCYTES NFR BLD AUTO: 0.2 %
KETONES UR QL STRIP: NEGATIVE
LEUKOCYTE ESTERASE UR QL STRIP: NEGATIVE
LIPASE SERPL-CCNC: 44 U/L
LYMPHOCYTES # BLD AUTO: 2.2 X10(3)/MCL (ref 0.6–4.6)
LYMPHOCYTES NFR BLD AUTO: 41 %
MCH RBC QN AUTO: 30.1 PG (ref 27–31)
MCHC RBC AUTO-ENTMCNC: 31.6 G/DL (ref 33–36)
MCV RBC AUTO: 95.2 FL (ref 80–94)
MONOCYTES # BLD AUTO: 0.42 X10(3)/MCL (ref 0.1–1.3)
MONOCYTES NFR BLD AUTO: 7.8 %
MUCOUS THREADS URNS QL MICRO: ABNORMAL /LPF
NEUTROPHILS # BLD AUTO: 2.63 X10(3)/MCL (ref 2.1–9.2)
NEUTROPHILS NFR BLD AUTO: 48.9 %
NITRITE UR QL STRIP: NEGATIVE
NRBC BLD AUTO-RTO: 0 %
PH UR STRIP: 6 [PH]
PLATELET # BLD AUTO: 201 X10(3)/MCL (ref 130–400)
PMV BLD AUTO: 10.4 FL (ref 7.4–10.4)
POTASSIUM SERPL-SCNC: 4.1 MMOL/L (ref 3.5–5.1)
PROT SERPL-MCNC: 6.9 GM/DL (ref 5.8–7.6)
PROT UR QL STRIP: NEGATIVE
RBC # BLD AUTO: 3.96 X10(6)/MCL (ref 4.7–6.1)
RBC #/AREA URNS AUTO: ABNORMAL /HPF
SODIUM SERPL-SCNC: 144 MMOL/L (ref 136–145)
SP GR UR STRIP.AUTO: 1.01 (ref 1–1.03)
SQUAMOUS #/AREA URNS LPF: ABNORMAL /HPF
UROBILINOGEN UR STRIP-ACNC: NORMAL
WBC # BLD AUTO: 5.37 X10(3)/MCL (ref 4.5–11.5)
WBC #/AREA URNS AUTO: ABNORMAL /HPF

## 2025-06-02 PROCEDURE — 96375 TX/PRO/DX INJ NEW DRUG ADDON: CPT

## 2025-06-02 PROCEDURE — 85025 COMPLETE CBC W/AUTO DIFF WBC: CPT | Performed by: NURSE PRACTITIONER

## 2025-06-02 PROCEDURE — 99284 EMERGENCY DEPT VISIT MOD MDM: CPT | Mod: 25

## 2025-06-02 PROCEDURE — 25000003 PHARM REV CODE 250: Performed by: NURSE PRACTITIONER

## 2025-06-02 PROCEDURE — 83690 ASSAY OF LIPASE: CPT | Performed by: NURSE PRACTITIONER

## 2025-06-02 PROCEDURE — 80053 COMPREHEN METABOLIC PANEL: CPT | Performed by: NURSE PRACTITIONER

## 2025-06-02 PROCEDURE — 96361 HYDRATE IV INFUSION ADD-ON: CPT

## 2025-06-02 PROCEDURE — 96374 THER/PROPH/DIAG INJ IV PUSH: CPT

## 2025-06-02 PROCEDURE — 81001 URINALYSIS AUTO W/SCOPE: CPT | Performed by: NURSE PRACTITIONER

## 2025-06-02 PROCEDURE — 63600175 PHARM REV CODE 636 W HCPCS: Performed by: NURSE PRACTITIONER

## 2025-06-02 RX ORDER — ONDANSETRON 4 MG/1
4 TABLET, ORALLY DISINTEGRATING ORAL EVERY 8 HOURS PRN
Qty: 9 TABLET | Refills: 0 | Status: SHIPPED | OUTPATIENT
Start: 2025-06-02 | End: 2025-06-05

## 2025-06-02 RX ORDER — ONDANSETRON HYDROCHLORIDE 2 MG/ML
4 INJECTION, SOLUTION INTRAVENOUS
Status: COMPLETED | OUTPATIENT
Start: 2025-06-02 | End: 2025-06-02

## 2025-06-02 RX ORDER — KETOROLAC TROMETHAMINE 30 MG/ML
15 INJECTION, SOLUTION INTRAMUSCULAR; INTRAVENOUS
Status: COMPLETED | OUTPATIENT
Start: 2025-06-02 | End: 2025-06-02

## 2025-06-02 RX ORDER — POLYETHYLENE GLYCOL 3350 17 G/17G
17 POWDER, FOR SOLUTION ORAL DAILY
Qty: 7 EACH | Refills: 0 | Status: SHIPPED | OUTPATIENT
Start: 2025-06-02 | End: 2025-06-09

## 2025-06-02 RX ORDER — DOCUSATE SODIUM 100 MG/1
100 CAPSULE, LIQUID FILLED ORAL 2 TIMES DAILY PRN
Qty: 14 CAPSULE | Refills: 0 | Status: SHIPPED | OUTPATIENT
Start: 2025-06-02 | End: 2025-06-09

## 2025-06-02 RX ADMIN — ONDANSETRON 4 MG: 2 INJECTION INTRAMUSCULAR; INTRAVENOUS at 02:06

## 2025-06-02 RX ADMIN — KETOROLAC TROMETHAMINE 15 MG: 30 INJECTION, SOLUTION INTRAMUSCULAR at 02:06

## 2025-06-02 RX ADMIN — SODIUM CHLORIDE 1000 ML: 9 INJECTION, SOLUTION INTRAVENOUS at 02:06

## 2025-07-14 ENCOUNTER — PATIENT OUTREACH (OUTPATIENT)
Facility: CLINIC | Age: 70
End: 2025-07-14
Payer: MEDICARE

## 2025-07-14 NOTE — PROGRESS NOTES
Health Maintenance Topic(s) Outreach Outcomes & Actions Taken:    Low Dose CT Screening - Outreach Outcomes & Actions Taken  : message to pcp        Additional Notes:  LDCT Report

## 2025-08-04 ENCOUNTER — LAB VISIT (OUTPATIENT)
Dept: LAB | Facility: HOSPITAL | Age: 70
End: 2025-08-04
Attending: NURSE PRACTITIONER
Payer: MEDICARE

## 2025-08-04 DIAGNOSIS — Z12.5 PROSTATE CANCER SCREENING: ICD-10-CM

## 2025-08-04 DIAGNOSIS — E11.65 UNCONTROLLED TYPE 2 DIABETES MELLITUS WITH HYPERGLYCEMIA: Primary | ICD-10-CM

## 2025-08-04 DIAGNOSIS — R79.89 ABNORMAL CBC: ICD-10-CM

## 2025-08-04 DIAGNOSIS — E11.65 UNCONTROLLED TYPE 2 DIABETES MELLITUS WITH HYPERGLYCEMIA: ICD-10-CM

## 2025-08-04 DIAGNOSIS — I10 PRIMARY HYPERTENSION: ICD-10-CM

## 2025-08-04 LAB
ALBUMIN SERPL-MCNC: 3.2 G/DL (ref 3.4–4.8)
ALBUMIN/GLOB SERPL: 0.7 RATIO (ref 1.1–2)
ALP SERPL-CCNC: 93 UNIT/L (ref 40–150)
ALT SERPL-CCNC: 13 UNIT/L (ref 0–55)
ANION GAP SERPL CALC-SCNC: 6 MEQ/L
AST SERPL-CCNC: 18 UNIT/L (ref 11–45)
BASOPHILS # BLD AUTO: 0.04 X10(3)/MCL
BASOPHILS NFR BLD AUTO: 0.7 %
BILIRUB SERPL-MCNC: 0.6 MG/DL
BUN SERPL-MCNC: 23.1 MG/DL (ref 8.4–25.7)
CALCIUM SERPL-MCNC: 8.8 MG/DL (ref 8.8–10)
CHLORIDE SERPL-SCNC: 109 MMOL/L (ref 98–107)
CHOLEST SERPL-MCNC: 150 MG/DL
CHOLEST/HDLC SERPL: 3 {RATIO} (ref 0–5)
CO2 SERPL-SCNC: 29 MMOL/L (ref 23–31)
CREAT SERPL-MCNC: 0.78 MG/DL (ref 0.72–1.25)
CREAT/UREA NIT SERPL: 30
EOSINOPHIL # BLD AUTO: 0.04 X10(3)/MCL (ref 0–0.9)
EOSINOPHIL NFR BLD AUTO: 0.7 %
ERYTHROCYTE [DISTWIDTH] IN BLOOD BY AUTOMATED COUNT: 11.9 % (ref 11.5–17)
EST. AVERAGE GLUCOSE BLD GHB EST-MCNC: 128.4 MG/DL
GFR SERPLBLD CREATININE-BSD FMLA CKD-EPI: >60 ML/MIN/1.73/M2
GLOBULIN SER-MCNC: 4.3 GM/DL (ref 2.4–3.5)
GLUCOSE SERPL-MCNC: 120 MG/DL (ref 82–115)
HBA1C MFR BLD: 6.1 %
HCT VFR BLD AUTO: 38.6 % (ref 42–52)
HDLC SERPL-MCNC: 46 MG/DL (ref 35–60)
HGB BLD-MCNC: 12.5 G/DL (ref 14–18)
IMM GRANULOCYTES # BLD AUTO: 0.02 X10(3)/MCL (ref 0–0.04)
IMM GRANULOCYTES NFR BLD AUTO: 0.3 %
LDLC SERPL CALC-MCNC: 81 MG/DL (ref 50–140)
LYMPHOCYTES # BLD AUTO: 2.24 X10(3)/MCL (ref 0.6–4.6)
LYMPHOCYTES NFR BLD AUTO: 37.7 %
MCH RBC QN AUTO: 30 PG (ref 27–31)
MCHC RBC AUTO-ENTMCNC: 32.4 G/DL (ref 33–36)
MCV RBC AUTO: 92.6 FL (ref 80–94)
MONOCYTES # BLD AUTO: 0.42 X10(3)/MCL (ref 0.1–1.3)
MONOCYTES NFR BLD AUTO: 7.1 %
NEUTROPHILS # BLD AUTO: 3.18 X10(3)/MCL (ref 2.1–9.2)
NEUTROPHILS NFR BLD AUTO: 53.5 %
NRBC BLD AUTO-RTO: 0 %
PLATELET # BLD AUTO: 243 X10(3)/MCL (ref 130–400)
PMV BLD AUTO: 9.3 FL (ref 7.4–10.4)
POTASSIUM SERPL-SCNC: 3.6 MMOL/L (ref 3.5–5.1)
PROT SERPL-MCNC: 7.5 GM/DL (ref 5.8–7.6)
PSA SERPL-MCNC: 0.87 NG/ML
RBC # BLD AUTO: 4.17 X10(6)/MCL (ref 4.7–6.1)
SODIUM SERPL-SCNC: 144 MMOL/L (ref 136–145)
T4 FREE SERPL-MCNC: 0.89 NG/DL (ref 0.7–1.48)
TRIGL SERPL-MCNC: 115 MG/DL (ref 34–140)
TSH SERPL-ACNC: 1.39 UIU/ML (ref 0.35–4.94)
VLDLC SERPL CALC-MCNC: 23 MG/DL
WBC # BLD AUTO: 5.94 X10(3)/MCL (ref 4.5–11.5)

## 2025-08-04 PROCEDURE — 80053 COMPREHEN METABOLIC PANEL: CPT

## 2025-08-04 PROCEDURE — 84153 ASSAY OF PSA TOTAL: CPT

## 2025-08-04 PROCEDURE — 84439 ASSAY OF FREE THYROXINE: CPT

## 2025-08-04 PROCEDURE — 36415 COLL VENOUS BLD VENIPUNCTURE: CPT

## 2025-08-04 PROCEDURE — 84443 ASSAY THYROID STIM HORMONE: CPT

## 2025-08-04 PROCEDURE — 83036 HEMOGLOBIN GLYCOSYLATED A1C: CPT

## 2025-08-04 PROCEDURE — 85025 COMPLETE CBC W/AUTO DIFF WBC: CPT

## 2025-08-04 PROCEDURE — 80061 LIPID PANEL: CPT

## 2025-08-07 ENCOUNTER — HOSPITAL ENCOUNTER (EMERGENCY)
Facility: HOSPITAL | Age: 70
Discharge: HOME | End: 2025-08-07
Attending: FAMILY MEDICINE
Payer: MEDICARE

## 2025-08-07 ENCOUNTER — OFFICE VISIT (OUTPATIENT)
Dept: INTERNAL MEDICINE | Facility: CLINIC | Age: 70
End: 2025-08-07
Payer: MEDICARE

## 2025-08-07 VITALS
TEMPERATURE: 98 F | BODY MASS INDEX: 19.37 KG/M2 | SYSTOLIC BLOOD PRESSURE: 169 MMHG | HEART RATE: 65 BPM | DIASTOLIC BLOOD PRESSURE: 91 MMHG | RESPIRATION RATE: 16 BRPM | OXYGEN SATURATION: 100 % | WEIGHT: 120 LBS

## 2025-08-07 VITALS
WEIGHT: 120.63 LBS | BODY MASS INDEX: 19.39 KG/M2 | HEIGHT: 66 IN | TEMPERATURE: 98 F | HEART RATE: 78 BPM | RESPIRATION RATE: 18 BRPM | OXYGEN SATURATION: 100 % | DIASTOLIC BLOOD PRESSURE: 87 MMHG | SYSTOLIC BLOOD PRESSURE: 174 MMHG

## 2025-08-07 DIAGNOSIS — S91.301A WOUND OF RIGHT FOOT: Primary | ICD-10-CM

## 2025-08-07 DIAGNOSIS — S90.31XA CONTUSION OF RIGHT FOOT, INITIAL ENCOUNTER: Primary | ICD-10-CM

## 2025-08-07 DIAGNOSIS — L03.031 CELLULITIS OF TOE OF RIGHT FOOT: ICD-10-CM

## 2025-08-07 LAB
ANION GAP SERPL CALC-SCNC: 15 MEQ/L
BASOPHILS # BLD AUTO: 0.03 X10(3)/MCL
BASOPHILS NFR BLD AUTO: 0.3 %
BUN SERPL-MCNC: 19.6 MG/DL (ref 8.4–25.7)
CALCIUM SERPL-MCNC: 9.5 MG/DL (ref 8.8–10)
CHLORIDE SERPL-SCNC: 102 MMOL/L (ref 98–107)
CO2 SERPL-SCNC: 22 MMOL/L (ref 23–31)
CREAT SERPL-MCNC: 0.87 MG/DL (ref 0.72–1.25)
CREAT/UREA NIT SERPL: 23
EOSINOPHIL # BLD AUTO: 0.03 X10(3)/MCL (ref 0–0.9)
EOSINOPHIL NFR BLD AUTO: 0.3 %
ERYTHROCYTE [DISTWIDTH] IN BLOOD BY AUTOMATED COUNT: 12 % (ref 11.5–17)
GFR SERPLBLD CREATININE-BSD FMLA CKD-EPI: >60 ML/MIN/1.73/M2
GLUCOSE SERPL-MCNC: 96 MG/DL (ref 82–115)
HCT VFR BLD AUTO: 37.3 % (ref 42–52)
HGB BLD-MCNC: 12.1 G/DL (ref 14–18)
HOLD SPECIMEN: NORMAL
IMM GRANULOCYTES # BLD AUTO: 0.02 X10(3)/MCL (ref 0–0.04)
IMM GRANULOCYTES NFR BLD AUTO: 0.2 %
LYMPHOCYTES # BLD AUTO: 3.05 X10(3)/MCL (ref 0.6–4.6)
LYMPHOCYTES NFR BLD AUTO: 34.4 %
MCH RBC QN AUTO: 28.9 PG (ref 27–31)
MCHC RBC AUTO-ENTMCNC: 32.4 G/DL (ref 33–36)
MCV RBC AUTO: 89.2 FL (ref 80–94)
MONOCYTES # BLD AUTO: 0.62 X10(3)/MCL (ref 0.1–1.3)
MONOCYTES NFR BLD AUTO: 7 %
NEUTROPHILS # BLD AUTO: 5.12 X10(3)/MCL (ref 2.1–9.2)
NEUTROPHILS NFR BLD AUTO: 57.8 %
NRBC BLD AUTO-RTO: 0 %
PLATELET # BLD AUTO: 248 X10(3)/MCL (ref 130–400)
PMV BLD AUTO: 10 FL (ref 7.4–10.4)
POTASSIUM SERPL-SCNC: 4.2 MMOL/L (ref 3.5–5.1)
RBC # BLD AUTO: 4.18 X10(6)/MCL (ref 4.7–6.1)
SODIUM SERPL-SCNC: 139 MMOL/L (ref 136–145)
URATE SERPL-MCNC: 4.6 MG/DL (ref 3.5–7.2)
WBC # BLD AUTO: 8.87 X10(3)/MCL (ref 4.5–11.5)

## 2025-08-07 PROCEDURE — 3044F HG A1C LEVEL LT 7.0%: CPT | Mod: CPTII,,, | Performed by: NURSE PRACTITIONER

## 2025-08-07 PROCEDURE — 1159F MED LIST DOCD IN RCRD: CPT | Mod: CPTII,,, | Performed by: NURSE PRACTITIONER

## 2025-08-07 PROCEDURE — 84550 ASSAY OF BLOOD/URIC ACID: CPT | Performed by: FAMILY MEDICINE

## 2025-08-07 PROCEDURE — 99214 OFFICE O/P EST MOD 30 MIN: CPT | Mod: PBBFAC | Performed by: NURSE PRACTITIONER

## 2025-08-07 PROCEDURE — 3077F SYST BP >= 140 MM HG: CPT | Mod: CPTII,,, | Performed by: NURSE PRACTITIONER

## 2025-08-07 PROCEDURE — 3079F DIAST BP 80-89 MM HG: CPT | Mod: CPTII,,, | Performed by: NURSE PRACTITIONER

## 2025-08-07 PROCEDURE — 1101F PT FALLS ASSESS-DOCD LE1/YR: CPT | Mod: CPTII,,, | Performed by: NURSE PRACTITIONER

## 2025-08-07 PROCEDURE — 1160F RVW MEDS BY RX/DR IN RCRD: CPT | Mod: CPTII,,, | Performed by: NURSE PRACTITIONER

## 2025-08-07 PROCEDURE — 4010F ACE/ARB THERAPY RXD/TAKEN: CPT | Mod: CPTII,,, | Performed by: NURSE PRACTITIONER

## 2025-08-07 PROCEDURE — 3008F BODY MASS INDEX DOCD: CPT | Mod: CPTII,,, | Performed by: NURSE PRACTITIONER

## 2025-08-07 PROCEDURE — 85025 COMPLETE CBC W/AUTO DIFF WBC: CPT | Performed by: FAMILY MEDICINE

## 2025-08-07 PROCEDURE — 3288F FALL RISK ASSESSMENT DOCD: CPT | Mod: CPTII,,, | Performed by: NURSE PRACTITIONER

## 2025-08-07 PROCEDURE — 99215 OFFICE O/P EST HI 40 MIN: CPT | Mod: S$PBB,,, | Performed by: NURSE PRACTITIONER

## 2025-08-07 PROCEDURE — 99284 EMERGENCY DEPT VISIT MOD MDM: CPT | Mod: 25,27

## 2025-08-07 PROCEDURE — 80048 BASIC METABOLIC PNL TOTAL CA: CPT | Performed by: FAMILY MEDICINE

## 2025-08-07 RX ORDER — NICOTINE 7MG/24HR
1 PATCH, TRANSDERMAL 24 HOURS TRANSDERMAL DAILY
Qty: 28 PATCH | Refills: 3 | Status: SHIPPED | OUTPATIENT
Start: 2025-08-07

## 2025-08-07 RX ORDER — AMOXICILLIN AND CLAVULANATE POTASSIUM 875; 125 MG/1; MG/1
1 TABLET, FILM COATED ORAL 2 TIMES DAILY
Qty: 14 TABLET | Refills: 0 | Status: SHIPPED | OUTPATIENT
Start: 2025-08-07

## 2025-08-07 NOTE — PROGRESS NOTES
Patient ID: [id number]     Chief Complaint: Follow-up (Lab results, rt foot swelling x1-2 weeks )        HPI:     KAYLEE Holder is a 69 y.o. male here today for a follow up. Pt c/o wound to right great toe with swelling to whole foot X 1 week. Pt states he hit it on a cement block and developed bleeding and drainage from toenail on great toe.         Immunizations:   There is no immunization history on file for this patient.     -------------------------------------    Chronic low back pain with left-sided sciatica    Diabetes mellitus    HTN (hypertension)        Past Surgical History:   Procedure Laterality Date    COLONOSCOPY N/A 4/10/2024    Procedure: COLON;  Surgeon: Ravi Petit MD;  Location: [facility] ENDOSCOPY;  Service: Gastroenterology;  Laterality: N/A;    EGD, WITH CLOSED BIOPSY  4/9/2024    Procedure: EGD, WITH CLOSED BIOPSY;  Surgeon: Ravi Petit MD;  Location: [facility] ENDOSCOPY;  Service: Gastroenterology;;    ESOPHAGOGASTRODUODENOSCOPY N/A 4/9/2024    Procedure: EGD;  Surgeon: Ravi Petit MD;  Location: [facility] ENDOSCOPY;  Service: Gastroenterology;  Laterality: N/A;    HIP SURGERY Left     LAPAROSCOPIC CHOLECYSTECTOMY N/A 4/22/2024    Procedure: CHOLECYSTECTOMY, LAPAROSCOPIC;  Surgeon: Demetrice Gardner MD;  Location: [facility];  Service: General;  Laterality: N/A;    ORIF HIP FRACTURE Right     >10 years       Review of patient's allergies indicates:  No Known Allergies    Current Outpatient Medications   Medication Instructions    amLODIPine (NORVASC) 10 mg, Oral, Daily    diclofenac (VOLTAREN) 75 MG EC tablet TAKE 1 TABLET BY MOUTH 2 TIMES DAILY AS NEEDED (PAIN).    EPCLUSA 400-100 mg Tab 1 tablet, Daily    gabapentin (NEURONTIN) 300 mg, Oral, 2 times daily    hydrOXYzine pamoate (VISTARIL) 25 mg, Oral, Every 6 hours PRN, May cause drowsiness    lisinopriL (PRINIVIL,ZESTRIL) 40 mg, Oral, Daily    metFORMIN (GLUCOPHAGE) 1,000 mg, Oral, 2 times daily with meals     "oxyCODONE-acetaminophen (PERCOCET) 5-325 mg per tablet 1 tablet, Oral, Every 6 hours PRN    pantoprazole (PROTONIX) 40 mg, Oral, 2 times daily    sucralfate (CARAFATE) 1 g, Oral, 4 times daily    traMADoL (ULTRAM) 50 mg, Oral, Every 6 hours PRN       Social History[1]     Family History   Problem Relation Name Age of Onset    No Known Problems Mother      Diabetes Father          Patient Care Team:  Marilu Amezquita FNP as PCP - General (Family Medicine)  Demetrice Valdez FNP as Nurse Practitioner (Infectious Diseases)     Subjective:     Review of Systems     See HPI for details    Constitutional: Denies Change in appetite. Denies Chills. Denies Fever. Denies Night sweats.  Eye: Denies Blurred vision. Denies Discharge. Denies Eye pain.  ENT: Denies Decreased hearing. Denies Sore throat. Denies Swollen glands.  Respiratory: Denies Cough. Denies Shortness of breath. Denies Shortness of breath with exertion. Denies Wheezing.  Cardiovascular: DeniesChest pain at rest. Denies Chest pain with exertion. Denies Irregular heartbeat. Denies Palpitations. Denies Edema.  Gastrointestinal: Denies Abdominal pain. DeniesDiarrhea. Denies Nausea. Denies Vomiting. Denies Hematemesis or Hematochezia.  Genitourinary: Denies Dysuria. Denies Urinary frequency. Denies Urinary urgency. Denies Blood in urine.  Endocrine: Denies Cold intolerance. Denies Excessive thirst. Denies Heat intolerance. Denies Weight loss. Denies Weight gain.  Musculoskeletal: Denies Painful joints. Denies Weakness.  Integumentary: Denies Rash. Denies Itching. Denies Dry skin.  Neurologic: Denies Dizziness. Denies Fainting. Denies Headache.  Psychiatric: Denies Depression. Denies Anxiety. Denies Suicidal/Homicidal ideations.    All Other ROS: Negative except as stated in HPI.       Objective:     Visit Vitals  BP (!) 174/87 (BP Location: Left arm, Patient Position: Sitting)   Pulse 78   Temp 98 °F (36.7 °C) (Oral)   Resp 18   Ht 5' 6" (1.676 m)   Wt 54.7 kg (120 " lb 9.6 oz)   SpO2 100%   BMI 19.47 kg/m²       Physical Exam    General: Alert and oriented, No acute distress.  Head: Normocephalic, Atraumatic.  Eye: Pupils are equal, round and reactive to light, Extraocular movements are intact, Sclera non-icteric.  Ears/Nose/Throat: Normal, Mucosa moist,Clear.  Neck/Thyroid: Supple, Non-tender, No carotid bruit, No lymphadenopathy, No JVD, Full range of motion.  Respiratory: Clear to auscultation bilaterally; No wheezes, rales or rhonchi,Non-labored respirations, Symmetrical chest wall expansion.  Cardiovascular: Regular rate and rhythm, S1/S2 normal, No murmurs, rubs or gallops.  Gastrointestinal: Soft, Non-tender, Non-distended, Normal bowel sounds, No palpable organomegaly.  Musculoskeletal: Normal range of motion.  Integumentary: Warm, Dry, Intact, No suspicious lesions or rashes.  Extremities: + right food with swelling and dark appearance. + pedal pulse noted. + right great toe nail lifted from nail plate with dried drainage noted. + odor noted. Pt denies pain to foot.   Neurologic: No focal deficits, Cranial Nerves II-XII are grossly intact, Motor strength normal upper and lower extremities, Sensory exam intact.  Psychiatric: Normal interaction, Coherent speech, Euthymic mood, Appropriate affect       Labs Reviewed:     Chemistry:  Lab Results   Component Value Date     08/04/2025    K 3.6 08/04/2025    BUN 23.1 08/04/2025    CREATININE 0.78 08/04/2025    EGFRNORACEVR >60 08/04/2025    CALCIUM 8.8 08/04/2025    ALKPHOS 93 08/04/2025    ALBUMIN 3.2 (L) 08/04/2025    BILIDIR 0.4 04/26/2024    IBILI 0.40 04/26/2024    AST 18 08/04/2025    ALT 13 08/04/2025    MG 1.60 12/26/2024    PHOS 1.8 (L) 04/30/2024    [id number] 15 (L) 12/26/2024        Lab Results   Component Value Date    HGBA1C 6.1 08/04/2025        Hematology:  Lab Results   Component Value Date    WBC 5.94 08/04/2025    HGB 12.5 (L) 08/04/2025    HCT 38.6 (L) 08/04/2025     08/04/2025       Lipid  Panel:  Lab Results   Component Value Date    CHOL 150 08/04/2025    HDL 46 08/04/2025    TRIG 115 08/04/2025    TOTALCHOLEST 3 08/04/2025        Urine:  Lab Results   Component Value Date    APPEARANCEUA Clear 06/02/2025    SGUA 1.014 06/02/2025    PROTEINUA Negative 06/02/2025    KETONESUA Negative 06/02/2025    LEUKOCYTESUR Negative 06/02/2025    RBCUA 0-5 06/02/2025    WBCUA None Seen 06/02/2025    BACTERIA None Seen 06/02/2025    SQEPUA None Seen 06/02/2025    HYALINECASTS None Seen 06/02/2025        Assessment:       ICD-10-CM ICD-9-CM   1. Wound of right foot  S91.301A 892.0        Plan:     1. Wound of right foot (Primary)  See pic in chart. Pt's right foot with edema up to ankle. + great toe nail lifted from nail plate with dried drainage noted under tip of toenail. + odor. Informed pt due to his DX of DM and discoloration and swelling of right foot he will need evaluation in ER for possible IV antibioitics and surgical eval. 15:20 called ER spoke with triage nurse Doreen- report given. Pt brought to ER via WC accompanied by Jeanna Erickson LPN and Leola Hayes LPN. Pt accompanied by his wife.               Follow up for Will schedule f/u once pt treated in ER for right food wound. . In addition to their scheduled follow up, the patient has also been instructed to follow up on as needed basis.     No future appointments.     RAY Bautista             [1]   Social History  Socioeconomic History    Marital status:    Tobacco Use    Smoking status: Every Day     Current packs/day: 0.50     Average packs/day: 0.5 packs/day for 53.6 years (26.8 ttl pk-yrs)     Types: Cigarettes     Start date: 1/1/1972    Smokeless tobacco: Never   Vaping Use    Vaping status: Never Used   Substance and Sexual Activity    Alcohol use: Not Currently     Comment: occ    Drug use: Never    Sexual activity: Yes     Partners: Female     Social Drivers of Health     Financial Resource Strain: Low Risk  (4/28/2024)     Overall Financial Resource Strain (CARDIA)     Difficulty of Paying Living Expenses: Not hard at all   Food Insecurity: No Food Insecurity (4/28/2024)    Hunger Vital Sign     Worried About Running Out of Food in the Last Year: Never true     Ran Out of Food in the Last Year: Never true   Transportation Needs: No Transportation Needs (4/28/2024)    PRAPARE - Transportation     Lack of Transportation (Medical): No     Lack of Transportation (Non-Medical): No   Physical Activity: Inactive (4/26/2024)    Exercise Vital Sign     Days of Exercise per Week: 0 days     Minutes of Exercise per Session: 0 min   Stress: Stress Concern Present (4/28/2024)    [employer] - Occupational Stress Questionnaire     Feeling of Stress : To some extent   Housing Stability: Low Risk  (4/28/2024)    Housing Stability Vital Sign     Unable to Pay for Housing in the Last Year: No     Homeless in the Last Year: No

## 2025-08-07 NOTE — LETTER
I certify that (Name) Heath Holder meets the requirements as outlined in # 6 (shown on reverse side) and qualifies for a mobility impaired license plate/hang-tag. I further understand that willful and false certification shall subject me to fines/imprisonment as outlined in R.S. 47:463.4 (G) (4). The applicant's information is as follows:    YOB: 1955            Race:Black or         Gender:Male    Address:  [address] [place] [address]    City:[place]                               State:[place]     Zip Code:[address]     []Permanently Impaired - Applicant has a total or lifelong condition of mobility impairment from which little or no improvement or recovery can reasonably be expected. A medical examiners certification is required on initial application only.      [x] Temporarily Impaired - Applicant has a temporary condition of mobility impairment of which improvement or recovery can reasonably be expected. Applicant is entitled to a hangtag, which will be valid for one (1) year. A medical examiners certification is required for the renewal of the hangtag      [] Unable to appear in person at the Office of Motor Vehicles - Applicant must bring facial photo        Medical Examiner's Signature________________________________________ Date:8/7/25_________________________    Printed Name:Tavon Amezquita FNP-BC__________________________    State License #_[id number]_______________    Address: [address] [place] [address]___                                     Phone Number: [phone]                                                                                                                                                                                                                  City: [place]__________________________________ State: [place] __Zip Code: [address] _______________    TO BE COMPLETED BY MOTOR VEHICLE ANALYST ONLY    REJI   Lic. Plate #      Hangtag Control #   Hangtag ID #      Date  Issued:    #:   Office #:

## 2025-08-20 ENCOUNTER — OFFICE VISIT (OUTPATIENT)
Dept: INTERNAL MEDICINE | Facility: CLINIC | Age: 70
End: 2025-08-20
Payer: MEDICARE

## 2025-08-20 VITALS
TEMPERATURE: 98 F | DIASTOLIC BLOOD PRESSURE: 75 MMHG | BODY MASS INDEX: 19.27 KG/M2 | OXYGEN SATURATION: 98 % | HEIGHT: 66 IN | HEART RATE: 69 BPM | RESPIRATION RATE: 18 BRPM | WEIGHT: 119.88 LBS | SYSTOLIC BLOOD PRESSURE: 123 MMHG

## 2025-08-20 DIAGNOSIS — I10 PRIMARY HYPERTENSION: Primary | ICD-10-CM

## 2025-08-20 DIAGNOSIS — R79.89 ABNORMAL CBC: ICD-10-CM

## 2025-08-20 DIAGNOSIS — G89.29 CHRONIC LEFT-SIDED LOW BACK PAIN WITH LEFT-SIDED SCIATICA: ICD-10-CM

## 2025-08-20 DIAGNOSIS — E11.65 UNCONTROLLED TYPE 2 DIABETES MELLITUS WITH HYPERGLYCEMIA: ICD-10-CM

## 2025-08-20 DIAGNOSIS — E11.9 CONTROLLED TYPE 2 DIABETES MELLITUS WITHOUT COMPLICATION, WITHOUT LONG-TERM CURRENT USE OF INSULIN: ICD-10-CM

## 2025-08-20 DIAGNOSIS — M54.42 CHRONIC LEFT-SIDED LOW BACK PAIN WITH LEFT-SIDED SCIATICA: ICD-10-CM

## 2025-08-20 PROCEDURE — 99214 OFFICE O/P EST MOD 30 MIN: CPT | Mod: PBBFAC | Performed by: NURSE PRACTITIONER

## 2025-08-20 PROCEDURE — 4010F ACE/ARB THERAPY RXD/TAKEN: CPT | Mod: CPTII,,, | Performed by: NURSE PRACTITIONER

## 2025-08-20 PROCEDURE — 3078F DIAST BP <80 MM HG: CPT | Mod: CPTII,,, | Performed by: NURSE PRACTITIONER

## 2025-08-20 PROCEDURE — 3074F SYST BP LT 130 MM HG: CPT | Mod: CPTII,,, | Performed by: NURSE PRACTITIONER

## 2025-08-20 PROCEDURE — 3008F BODY MASS INDEX DOCD: CPT | Mod: CPTII,,, | Performed by: NURSE PRACTITIONER

## 2025-08-20 PROCEDURE — 3044F HG A1C LEVEL LT 7.0%: CPT | Mod: CPTII,,, | Performed by: NURSE PRACTITIONER

## 2025-08-20 PROCEDURE — 1160F RVW MEDS BY RX/DR IN RCRD: CPT | Mod: CPTII,,, | Performed by: NURSE PRACTITIONER

## 2025-08-20 PROCEDURE — 3288F FALL RISK ASSESSMENT DOCD: CPT | Mod: CPTII,,, | Performed by: NURSE PRACTITIONER

## 2025-08-20 PROCEDURE — 1101F PT FALLS ASSESS-DOCD LE1/YR: CPT | Mod: CPTII,,, | Performed by: NURSE PRACTITIONER

## 2025-08-20 PROCEDURE — 1159F MED LIST DOCD IN RCRD: CPT | Mod: CPTII,,, | Performed by: NURSE PRACTITIONER

## 2025-08-20 PROCEDURE — 99214 OFFICE O/P EST MOD 30 MIN: CPT | Mod: S$PBB,,, | Performed by: NURSE PRACTITIONER

## 2025-08-20 RX ORDER — DICLOFENAC SODIUM 75 MG/1
75 TABLET, DELAYED RELEASE ORAL 2 TIMES DAILY PRN
Qty: 60 TABLET | Refills: 4 | Status: SHIPPED | OUTPATIENT
Start: 2025-08-20

## 2025-08-20 RX ORDER — LISINOPRIL 40 MG/1
40 TABLET ORAL DAILY
Qty: 30 TABLET | Refills: 2 | Status: SHIPPED | OUTPATIENT
Start: 2025-08-20 | End: 2026-08-20

## 2025-08-20 RX ORDER — GABAPENTIN 300 MG/1
300 CAPSULE ORAL 2 TIMES DAILY
Qty: 60 CAPSULE | Refills: 6 | Status: SHIPPED | OUTPATIENT
Start: 2025-08-20 | End: 2025-10-19

## 2025-08-20 RX ORDER — METFORMIN HYDROCHLORIDE 1000 MG/1
1000 TABLET ORAL 2 TIMES DAILY WITH MEALS
Qty: 180 TABLET | Refills: 1 | Status: SHIPPED | OUTPATIENT
Start: 2025-08-20

## 2025-08-31 ENCOUNTER — HOSPITAL ENCOUNTER (INPATIENT)
Facility: HOSPITAL | Age: 70
LOS: 3 days | Discharge: LEFT BEFORE DISCHARGE | DRG: 300 | End: 2025-09-03
Attending: EMERGENCY MEDICINE | Admitting: STUDENT IN AN ORGANIZED HEALTH CARE EDUCATION/TRAINING PROGRAM
Payer: MEDICARE

## 2025-08-31 DIAGNOSIS — M86.171 OTHER ACUTE OSTEOMYELITIS OF RIGHT FOOT: ICD-10-CM

## 2025-08-31 DIAGNOSIS — M79.89 PAIN AND SWELLING OF TOE, RIGHT: Primary | ICD-10-CM

## 2025-08-31 DIAGNOSIS — M79.674 PAIN AND SWELLING OF TOE, RIGHT: Primary | ICD-10-CM

## 2025-08-31 LAB
ALBUMIN SERPL-MCNC: 3.8 G/DL (ref 3.4–4.8)
ALBUMIN/GLOB SERPL: 0.7 RATIO (ref 1.1–2)
ALP SERPL-CCNC: 108 UNIT/L (ref 40–150)
ALT SERPL-CCNC: 13 UNIT/L (ref 0–55)
ANION GAP SERPL CALC-SCNC: 11 MEQ/L
AST SERPL-CCNC: 23 UNIT/L (ref 11–45)
BASOPHILS # BLD AUTO: 0.03 X10(3)/MCL
BASOPHILS NFR BLD AUTO: 0.4 %
BILIRUB SERPL-MCNC: 0.6 MG/DL
BUN SERPL-MCNC: 15.6 MG/DL (ref 8.4–25.7)
CALCIUM SERPL-MCNC: 9.6 MG/DL (ref 8.8–10)
CHLORIDE SERPL-SCNC: 102 MMOL/L (ref 98–107)
CO2 SERPL-SCNC: 28 MMOL/L (ref 23–31)
CREAT SERPL-MCNC: 0.75 MG/DL (ref 0.72–1.25)
CREAT/UREA NIT SERPL: 21
EOSINOPHIL # BLD AUTO: 0.09 X10(3)/MCL (ref 0–0.9)
EOSINOPHIL NFR BLD AUTO: 1.3 %
ERYTHROCYTE [DISTWIDTH] IN BLOOD BY AUTOMATED COUNT: 13.1 % (ref 11.5–17)
GFR SERPLBLD CREATININE-BSD FMLA CKD-EPI: >60 ML/MIN/1.73/M2
GLOBULIN SER-MCNC: 5.3 GM/DL (ref 2.4–3.5)
GLUCOSE SERPL-MCNC: 96 MG/DL (ref 82–115)
HCT VFR BLD AUTO: 41.4 % (ref 42–52)
HGB BLD-MCNC: 13.5 G/DL (ref 14–18)
IMM GRANULOCYTES # BLD AUTO: 0.01 X10(3)/MCL (ref 0–0.04)
IMM GRANULOCYTES NFR BLD AUTO: 0.1 %
LEFT ABI: 1.59
LEFT CFA PSV: 97 CM/S
LEFT DORSALIS PEDIS: 255 MMHG
LEFT POSTERIOR TIBIAL: 149 MMHG
LYMPHOCYTES # BLD AUTO: 2.63 X10(3)/MCL (ref 0.6–4.6)
LYMPHOCYTES NFR BLD AUTO: 38.2 %
MCH RBC QN AUTO: 29.9 PG (ref 27–31)
MCHC RBC AUTO-ENTMCNC: 32.6 G/DL (ref 33–36)
MCV RBC AUTO: 91.6 FL (ref 80–94)
MONOCYTES # BLD AUTO: 0.52 X10(3)/MCL (ref 0.1–1.3)
MONOCYTES NFR BLD AUTO: 7.5 %
NEUTROPHILS # BLD AUTO: 3.61 X10(3)/MCL (ref 2.1–9.2)
NEUTROPHILS NFR BLD AUTO: 52.5 %
NRBC BLD AUTO-RTO: 0 %
OHS CV CPX PATIENT HEIGHT IN: 67
OHS CV CPX PATIENT HEIGHT IN: 67
PLATELET # BLD AUTO: 280 X10(3)/MCL (ref 130–400)
PMV BLD AUTO: 9.6 FL (ref 7.4–10.4)
POCT GLUCOSE: 224 MG/DL (ref 70–110)
POTASSIUM SERPL-SCNC: 3.8 MMOL/L (ref 3.5–5.1)
PROT SERPL-MCNC: 9.1 GM/DL (ref 5.8–7.6)
RBC # BLD AUTO: 4.52 X10(6)/MCL (ref 4.7–6.1)
RIGHT ABI: 1.59
RIGHT ARM BP: 160 MMHG
RIGHT CFA PSV: 73 CM/S
RIGHT DORSALIS PEDIS PSV: 33 CM/S
RIGHT DORSALIS PEDIS: 255 MMHG
RIGHT POPLITEAL PSV: 44 CM/S
RIGHT POST TIBIAL SYS PSV: 37 CM/S
RIGHT POSTERIOR TIBIAL: 187 MMHG
RIGHT PROFUNDA SYS PSV: 65 CM/S
RIGHT SUPER FEMORAL DIST SYS PSV: 103 CM/S
RIGHT SUPER FEMORAL MID SYS PSV: 83 CM/S
RIGHT SUPER FEMORAL PROX SYS PSV: 59 CM/S
RIGHT TIB/PER TRUNK SYS PSV: 6 CM/S
SODIUM SERPL-SCNC: 141 MMOL/L (ref 136–145)
WBC # BLD AUTO: 6.89 X10(3)/MCL (ref 4.5–11.5)

## 2025-08-31 PROCEDURE — 63600175 PHARM REV CODE 636 W HCPCS: Performed by: EMERGENCY MEDICINE

## 2025-08-31 PROCEDURE — 85025 COMPLETE CBC W/AUTO DIFF WBC: CPT | Performed by: NURSE PRACTITIONER

## 2025-08-31 PROCEDURE — 96375 TX/PRO/DX INJ NEW DRUG ADDON: CPT

## 2025-08-31 PROCEDURE — 25000003 PHARM REV CODE 250: Performed by: EMERGENCY MEDICINE

## 2025-08-31 PROCEDURE — 96374 THER/PROPH/DIAG INJ IV PUSH: CPT

## 2025-08-31 PROCEDURE — 11000001 HC ACUTE MED/SURG PRIVATE ROOM

## 2025-08-31 PROCEDURE — 87040 BLOOD CULTURE FOR BACTERIA: CPT

## 2025-08-31 PROCEDURE — 99285 EMERGENCY DEPT VISIT HI MDM: CPT | Mod: 25

## 2025-08-31 PROCEDURE — 63600175 PHARM REV CODE 636 W HCPCS

## 2025-08-31 PROCEDURE — 80053 COMPREHEN METABOLIC PANEL: CPT | Performed by: NURSE PRACTITIONER

## 2025-08-31 PROCEDURE — 63600175 PHARM REV CODE 636 W HCPCS: Performed by: PHYSICIAN ASSISTANT

## 2025-08-31 PROCEDURE — 25500020 PHARM REV CODE 255: Performed by: STUDENT IN AN ORGANIZED HEALTH CARE EDUCATION/TRAINING PROGRAM

## 2025-08-31 RX ORDER — MORPHINE SULFATE 4 MG/ML
4 INJECTION, SOLUTION INTRAMUSCULAR; INTRAVENOUS
Refills: 0 | Status: COMPLETED | OUTPATIENT
Start: 2025-08-31 | End: 2025-08-31

## 2025-08-31 RX ORDER — GLUCAGON 1 MG
1 KIT INJECTION
Status: DISCONTINUED | OUTPATIENT
Start: 2025-08-31 | End: 2025-09-03 | Stop reason: HOSPADM

## 2025-08-31 RX ORDER — IBUPROFEN 200 MG
16 TABLET ORAL
Status: DISCONTINUED | OUTPATIENT
Start: 2025-08-31 | End: 2025-09-03 | Stop reason: HOSPADM

## 2025-08-31 RX ORDER — IBUPROFEN 200 MG
24 TABLET ORAL
Status: DISCONTINUED | OUTPATIENT
Start: 2025-08-31 | End: 2025-09-03 | Stop reason: HOSPADM

## 2025-08-31 RX ORDER — INSULIN ASPART 100 [IU]/ML
0-10 INJECTION, SOLUTION INTRAVENOUS; SUBCUTANEOUS
Status: DISCONTINUED | OUTPATIENT
Start: 2025-08-31 | End: 2025-09-03 | Stop reason: HOSPADM

## 2025-08-31 RX ORDER — ACETAMINOPHEN 325 MG/1
650 TABLET ORAL EVERY 8 HOURS PRN
Status: DISCONTINUED | OUTPATIENT
Start: 2025-08-31 | End: 2025-09-03 | Stop reason: HOSPADM

## 2025-08-31 RX ORDER — ENOXAPARIN SODIUM 100 MG/ML
40 INJECTION SUBCUTANEOUS EVERY 24 HOURS
Status: DISCONTINUED | OUTPATIENT
Start: 2025-08-31 | End: 2025-09-03 | Stop reason: HOSPADM

## 2025-08-31 RX ORDER — ONDANSETRON HYDROCHLORIDE 2 MG/ML
4 INJECTION, SOLUTION INTRAVENOUS
Status: COMPLETED | OUTPATIENT
Start: 2025-08-31 | End: 2025-08-31

## 2025-08-31 RX ORDER — SODIUM CHLORIDE 0.9 % (FLUSH) 0.9 %
10 SYRINGE (ML) INJECTION EVERY 12 HOURS PRN
Status: DISCONTINUED | OUTPATIENT
Start: 2025-08-31 | End: 2025-09-03 | Stop reason: HOSPADM

## 2025-08-31 RX ORDER — HYDROCODONE BITARTRATE AND ACETAMINOPHEN 5; 325 MG/1; MG/1
1 TABLET ORAL EVERY 4 HOURS PRN
Refills: 0 | Status: DISPENSED | OUTPATIENT
Start: 2025-08-31 | End: 2025-09-02

## 2025-08-31 RX ORDER — HYDRALAZINE HYDROCHLORIDE 20 MG/ML
20 INJECTION INTRAMUSCULAR; INTRAVENOUS EVERY 4 HOURS PRN
Status: DISCONTINUED | OUTPATIENT
Start: 2025-08-31 | End: 2025-09-03 | Stop reason: HOSPADM

## 2025-08-31 RX ORDER — ONDANSETRON HYDROCHLORIDE 2 MG/ML
4 INJECTION, SOLUTION INTRAVENOUS EVERY 4 HOURS PRN
Status: DISCONTINUED | OUTPATIENT
Start: 2025-08-31 | End: 2025-09-03 | Stop reason: HOSPADM

## 2025-08-31 RX ORDER — ACETAMINOPHEN 325 MG/1
650 TABLET ORAL EVERY 4 HOURS PRN
Status: DISCONTINUED | OUTPATIENT
Start: 2025-08-31 | End: 2025-09-03 | Stop reason: HOSPADM

## 2025-08-31 RX ADMIN — ENOXAPARIN SODIUM 40 MG: 40 INJECTION SUBCUTANEOUS at 05:08

## 2025-08-31 RX ADMIN — ONDANSETRON 4 MG: 2 INJECTION INTRAMUSCULAR; INTRAVENOUS at 02:08

## 2025-08-31 RX ADMIN — DEXTROSE MONOHYDRATE 1000 MG: 50 INJECTION, SOLUTION INTRAVENOUS at 04:08

## 2025-08-31 RX ADMIN — INSULIN ASPART 4 UNITS: 100 INJECTION, SOLUTION INTRAVENOUS; SUBCUTANEOUS at 05:08

## 2025-08-31 RX ADMIN — MORPHINE SULFATE 4 MG: 4 INJECTION, SOLUTION INTRAMUSCULAR; INTRAVENOUS at 02:08

## 2025-08-31 RX ADMIN — IOHEXOL 100 ML: 350 INJECTION, SOLUTION INTRAVENOUS at 03:08

## 2025-08-31 RX ADMIN — VANCOMYCIN HYDROCHLORIDE 750 MG: 750 INJECTION, POWDER, LYOPHILIZED, FOR SOLUTION INTRAVENOUS at 06:08

## 2025-09-01 LAB
ACCEPTIBLE SP GR UR QL: 1.01 (ref 1–1.03)
ALBUMIN SERPL-MCNC: 3 G/DL (ref 3.4–4.8)
ALBUMIN/GLOB SERPL: 0.8 RATIO (ref 1.1–2)
ALP SERPL-CCNC: 92 UNIT/L (ref 40–150)
ALT SERPL-CCNC: 9 UNIT/L (ref 0–55)
AMPHET UR QL SCN: NEGATIVE
ANION GAP SERPL CALC-SCNC: 8 MEQ/L
AST SERPL-CCNC: 14 UNIT/L (ref 11–45)
BARBITURATE SCN PRESENT UR: NEGATIVE
BASOPHILS # BLD AUTO: 0.04 X10(3)/MCL
BASOPHILS NFR BLD AUTO: 0.6 %
BENZODIAZ UR QL SCN: NEGATIVE
BILIRUB SERPL-MCNC: 0.4 MG/DL
BUN SERPL-MCNC: 14.5 MG/DL (ref 8.4–25.7)
CALCIUM SERPL-MCNC: 9.1 MG/DL (ref 8.8–10)
CANNABINOIDS UR QL SCN: NEGATIVE
CHLORIDE SERPL-SCNC: 103 MMOL/L (ref 98–107)
CO2 SERPL-SCNC: 28 MMOL/L (ref 23–31)
COCAINE UR QL SCN: POSITIVE
CREAT SERPL-MCNC: 0.88 MG/DL (ref 0.72–1.25)
CREAT/UREA NIT SERPL: 16
EOSINOPHIL # BLD AUTO: 0.12 X10(3)/MCL (ref 0–0.9)
EOSINOPHIL NFR BLD AUTO: 1.7 %
ERYTHROCYTE [DISTWIDTH] IN BLOOD BY AUTOMATED COUNT: 13.1 % (ref 11.5–17)
FENTANYL UR QL SCN: NEGATIVE
GFR SERPLBLD CREATININE-BSD FMLA CKD-EPI: >60 ML/MIN/1.73/M2
GLOBULIN SER-MCNC: 3.8 GM/DL (ref 2.4–3.5)
GLUCOSE SERPL-MCNC: 139 MG/DL (ref 82–115)
HCT VFR BLD AUTO: 34.7 % (ref 42–52)
HGB BLD-MCNC: 11.5 G/DL (ref 14–18)
IMM GRANULOCYTES # BLD AUTO: 0.01 X10(3)/MCL (ref 0–0.04)
IMM GRANULOCYTES NFR BLD AUTO: 0.1 %
LYMPHOCYTES # BLD AUTO: 2.93 X10(3)/MCL (ref 0.6–4.6)
LYMPHOCYTES NFR BLD AUTO: 40.9 %
MCH RBC QN AUTO: 29.6 PG (ref 27–31)
MCHC RBC AUTO-ENTMCNC: 33.1 G/DL (ref 33–36)
MCV RBC AUTO: 89.2 FL (ref 80–94)
MDMA UR QL SCN: NEGATIVE
MONOCYTES # BLD AUTO: 0.63 X10(3)/MCL (ref 0.1–1.3)
MONOCYTES NFR BLD AUTO: 8.8 %
NEUTROPHILS # BLD AUTO: 3.44 X10(3)/MCL (ref 2.1–9.2)
NEUTROPHILS NFR BLD AUTO: 47.9 %
NRBC BLD AUTO-RTO: 0 %
OPIATES UR QL SCN: NEGATIVE
PCP UR QL: NEGATIVE
PH UR: 6.5 [PH] (ref 3–11)
PLATELET # BLD AUTO: 248 X10(3)/MCL (ref 130–400)
PMV BLD AUTO: 10.3 FL (ref 7.4–10.4)
POCT GLUCOSE: 106 MG/DL (ref 70–110)
POCT GLUCOSE: 114 MG/DL (ref 70–110)
POCT GLUCOSE: 122 MG/DL (ref 70–110)
POCT GLUCOSE: 78 MG/DL (ref 70–110)
POTASSIUM SERPL-SCNC: 4 MMOL/L (ref 3.5–5.1)
PROT SERPL-MCNC: 6.8 GM/DL (ref 5.8–7.6)
RBC # BLD AUTO: 3.89 X10(6)/MCL (ref 4.7–6.1)
SODIUM SERPL-SCNC: 139 MMOL/L (ref 136–145)
WBC # BLD AUTO: 7.17 X10(3)/MCL (ref 4.5–11.5)

## 2025-09-01 PROCEDURE — 80053 COMPREHEN METABOLIC PANEL: CPT | Performed by: PHYSICIAN ASSISTANT

## 2025-09-01 PROCEDURE — 25000003 PHARM REV CODE 250: Performed by: PHYSICIAN ASSISTANT

## 2025-09-01 PROCEDURE — 85025 COMPLETE CBC W/AUTO DIFF WBC: CPT | Performed by: PHYSICIAN ASSISTANT

## 2025-09-01 PROCEDURE — 63600175 PHARM REV CODE 636 W HCPCS: Performed by: STUDENT IN AN ORGANIZED HEALTH CARE EDUCATION/TRAINING PROGRAM

## 2025-09-01 PROCEDURE — 11000001 HC ACUTE MED/SURG PRIVATE ROOM

## 2025-09-01 PROCEDURE — 80307 DRUG TEST PRSMV CHEM ANLYZR: CPT | Performed by: SPECIALIST

## 2025-09-01 PROCEDURE — 99222 1ST HOSP IP/OBS MODERATE 55: CPT | Mod: ,,, | Performed by: SPECIALIST

## 2025-09-01 PROCEDURE — 25000003 PHARM REV CODE 250: Performed by: STUDENT IN AN ORGANIZED HEALTH CARE EDUCATION/TRAINING PROGRAM

## 2025-09-01 PROCEDURE — 36415 COLL VENOUS BLD VENIPUNCTURE: CPT | Performed by: PHYSICIAN ASSISTANT

## 2025-09-01 PROCEDURE — 63600175 PHARM REV CODE 636 W HCPCS: Performed by: PHYSICIAN ASSISTANT

## 2025-09-01 RX ADMIN — PIPERACILLIN SODIUM AND TAZOBACTAM SODIUM 4.5 G: 4; .5 INJECTION, POWDER, LYOPHILIZED, FOR SOLUTION INTRAVENOUS at 12:09

## 2025-09-01 RX ADMIN — HYDROCODONE BITARTRATE AND ACETAMINOPHEN 1 TABLET: 5; 325 TABLET ORAL at 06:09

## 2025-09-01 RX ADMIN — PIPERACILLIN SODIUM AND TAZOBACTAM SODIUM 4.5 G: 4; .5 INJECTION, POWDER, LYOPHILIZED, FOR SOLUTION INTRAVENOUS at 09:09

## 2025-09-01 RX ADMIN — PIPERACILLIN SODIUM AND TAZOBACTAM SODIUM 4.5 G: 4; .5 INJECTION, POWDER, LYOPHILIZED, FOR SOLUTION INTRAVENOUS at 05:09

## 2025-09-01 RX ADMIN — VANCOMYCIN HYDROCHLORIDE 1250 MG: 1.25 INJECTION, POWDER, LYOPHILIZED, FOR SOLUTION INTRAVENOUS at 06:09

## 2025-09-02 LAB
POCT GLUCOSE: 89 MG/DL (ref 70–110)
POCT GLUCOSE: 92 MG/DL (ref 70–110)
POCT GLUCOSE: 94 MG/DL (ref 70–110)
VANCOMYCIN TROUGH SERPL-MCNC: 15.8 UG/ML (ref 15–20)

## 2025-09-02 PROCEDURE — 11000001 HC ACUTE MED/SURG PRIVATE ROOM

## 2025-09-02 PROCEDURE — 25000003 PHARM REV CODE 250: Performed by: STUDENT IN AN ORGANIZED HEALTH CARE EDUCATION/TRAINING PROGRAM

## 2025-09-02 PROCEDURE — 25000003 PHARM REV CODE 250: Performed by: PHYSICIAN ASSISTANT

## 2025-09-02 PROCEDURE — 63600175 PHARM REV CODE 636 W HCPCS: Performed by: PHYSICIAN ASSISTANT

## 2025-09-02 PROCEDURE — 80202 ASSAY OF VANCOMYCIN: CPT | Performed by: STUDENT IN AN ORGANIZED HEALTH CARE EDUCATION/TRAINING PROGRAM

## 2025-09-02 PROCEDURE — 36415 COLL VENOUS BLD VENIPUNCTURE: CPT | Performed by: STUDENT IN AN ORGANIZED HEALTH CARE EDUCATION/TRAINING PROGRAM

## 2025-09-02 PROCEDURE — 63600175 PHARM REV CODE 636 W HCPCS: Performed by: STUDENT IN AN ORGANIZED HEALTH CARE EDUCATION/TRAINING PROGRAM

## 2025-09-02 PROCEDURE — 25000003 PHARM REV CODE 250

## 2025-09-02 RX ORDER — HYDROCODONE BITARTRATE AND ACETAMINOPHEN 5; 325 MG/1; MG/1
1 TABLET ORAL EVERY 6 HOURS PRN
Refills: 0 | Status: DISCONTINUED | OUTPATIENT
Start: 2025-09-02 | End: 2025-09-03 | Stop reason: HOSPADM

## 2025-09-02 RX ADMIN — VANCOMYCIN HYDROCHLORIDE 1250 MG: 1.25 INJECTION, POWDER, LYOPHILIZED, FOR SOLUTION INTRAVENOUS at 10:09

## 2025-09-02 RX ADMIN — HYDROCODONE BITARTRATE AND ACETAMINOPHEN 1 TABLET: 5; 325 TABLET ORAL at 09:09

## 2025-09-02 RX ADMIN — PIPERACILLIN SODIUM AND TAZOBACTAM SODIUM 4.5 G: 4; .5 INJECTION, POWDER, LYOPHILIZED, FOR SOLUTION INTRAVENOUS at 09:09

## 2025-09-02 RX ADMIN — PIPERACILLIN SODIUM AND TAZOBACTAM SODIUM 4.5 G: 4; .5 INJECTION, POWDER, LYOPHILIZED, FOR SOLUTION INTRAVENOUS at 05:09

## 2025-09-02 RX ADMIN — HYDROCODONE BITARTRATE AND ACETAMINOPHEN 1 TABLET: 5; 325 TABLET ORAL at 01:09

## 2025-09-02 RX ADMIN — PIPERACILLIN SODIUM AND TAZOBACTAM SODIUM 4.5 G: 4; .5 INJECTION, POWDER, LYOPHILIZED, FOR SOLUTION INTRAVENOUS at 01:09

## 2025-09-02 RX ADMIN — HYDROCODONE BITARTRATE AND ACETAMINOPHEN 1 TABLET: 5; 325 TABLET ORAL at 02:09

## 2025-09-03 VITALS
WEIGHT: 126.75 LBS | RESPIRATION RATE: 20 BRPM | OXYGEN SATURATION: 99 % | BODY MASS INDEX: 19.89 KG/M2 | SYSTOLIC BLOOD PRESSURE: 123 MMHG | TEMPERATURE: 98 F | HEART RATE: 79 BPM | HEIGHT: 67 IN | DIASTOLIC BLOOD PRESSURE: 73 MMHG

## 2025-09-03 LAB
POCT GLUCOSE: 122 MG/DL (ref 70–110)
POCT GLUCOSE: 141 MG/DL (ref 70–110)
POCT GLUCOSE: 142 MG/DL (ref 70–110)
POCT GLUCOSE: 98 MG/DL (ref 70–110)

## 2025-09-03 PROCEDURE — 63600175 PHARM REV CODE 636 W HCPCS: Performed by: PHYSICIAN ASSISTANT

## 2025-09-03 PROCEDURE — 25000003 PHARM REV CODE 250: Performed by: STUDENT IN AN ORGANIZED HEALTH CARE EDUCATION/TRAINING PROGRAM

## 2025-09-03 PROCEDURE — 25000003 PHARM REV CODE 250

## 2025-09-03 PROCEDURE — 63600175 PHARM REV CODE 636 W HCPCS: Performed by: STUDENT IN AN ORGANIZED HEALTH CARE EDUCATION/TRAINING PROGRAM

## 2025-09-03 PROCEDURE — 25000003 PHARM REV CODE 250: Performed by: PHYSICIAN ASSISTANT

## 2025-09-03 RX ADMIN — HYDROCODONE BITARTRATE AND ACETAMINOPHEN 1 TABLET: 5; 325 TABLET ORAL at 10:09

## 2025-09-03 RX ADMIN — ENOXAPARIN SODIUM 40 MG: 40 INJECTION SUBCUTANEOUS at 05:09

## 2025-09-03 RX ADMIN — PIPERACILLIN SODIUM AND TAZOBACTAM SODIUM 4.5 G: 4; .5 INJECTION, POWDER, LYOPHILIZED, FOR SOLUTION INTRAVENOUS at 05:09

## 2025-09-03 RX ADMIN — HYDROCODONE BITARTRATE AND ACETAMINOPHEN 1 TABLET: 5; 325 TABLET ORAL at 04:09

## 2025-09-03 RX ADMIN — PIPERACILLIN SODIUM AND TAZOBACTAM SODIUM 4.5 G: 4; .5 INJECTION, POWDER, LYOPHILIZED, FOR SOLUTION INTRAVENOUS at 09:09

## 2025-09-03 RX ADMIN — PIPERACILLIN SODIUM AND TAZOBACTAM SODIUM 4.5 G: 4; .5 INJECTION, POWDER, LYOPHILIZED, FOR SOLUTION INTRAVENOUS at 12:09

## 2025-09-03 RX ADMIN — VANCOMYCIN HYDROCHLORIDE 1250 MG: 1.25 INJECTION, POWDER, LYOPHILIZED, FOR SOLUTION INTRAVENOUS at 12:09

## 2025-09-05 LAB
BACTERIA BLD CULT: NORMAL
BACTERIA BLD CULT: NORMAL

## (undated) DEVICE — SUT 0 VICRYL / UR6 (J603)

## (undated) DEVICE — APPLICATOR CHLORAPREP ORN 26ML

## (undated) DEVICE — ADAPTER DUAL NSL LUER M-M 7FT

## (undated) DEVICE — SOL IRRI STRL WATER 1000ML

## (undated) DEVICE — CONTAINER SPECIMEN SCREW 4OZ

## (undated) DEVICE — TUBING O2 FEMALE CONN 13FT

## (undated) DEVICE — GLOVE SENSICARE PI GRN 6.5

## (undated) DEVICE — POSITIONER HEEL FOAM CONVOLTD

## (undated) DEVICE — BAG LABGUARD BIOHAZARD 6X9IN

## (undated) DEVICE — SYR 10CC LUER LOCK

## (undated) DEVICE — KIT LAPAROSCOPY UNIVERSITY

## (undated) DEVICE — APPLICATOR SURGICEL ENDOSCOPIC

## (undated) DEVICE — MANIFOLD 4 PORT

## (undated) DEVICE — COLLECTION SPECIMEN NEPTUNE

## (undated) DEVICE — KIT VUETIP TROCAR SWAB

## (undated) DEVICE — NDL PNEUMO INSUFFLATI 120MM

## (undated) DEVICE — SUT MCRYL PLUS 4-0 PS2 27IN

## (undated) DEVICE — BAG TISS RETRV MONARCH 10MM

## (undated) DEVICE — GLOVE SIGNATURE MICRO LTX 6.5

## (undated) DEVICE — SUT 3-0 VICRYL / SH (J416)

## (undated) DEVICE — KIT SURGICAL TURNOVER

## (undated) DEVICE — BLOCK BLOX BITE DENT RIM 54FR

## (undated) DEVICE — GLOVE SIGNATURE MICRO LTX 6

## (undated) DEVICE — GLOVE SIGNATURE MICRO LTX 7

## (undated) DEVICE — HEMOSTAT SURGICEL PWD 3G

## (undated) DEVICE — SLEEVE KII ADV FIX 5X100MM

## (undated) DEVICE — GOWN POLY REINF BRTH SLV XL

## (undated) DEVICE — ADHESIVE DERMABOND ADVANCED

## (undated) DEVICE — KIT CANIST SUCTION 1200CC

## (undated) DEVICE — TROCAR LAPSCP XCEL 12MM 10CM

## (undated) DEVICE — APPLIER CLIP ENDO MED/LG 10MM

## (undated) DEVICE — SPONGE LAP 18X18 PREWASHED

## (undated) DEVICE — NDL HYPO REG 25G X 1 1/2

## (undated) DEVICE — KIT SURGICAL COLON .25 1.1OZ

## (undated) DEVICE — TIP SUCTION YANKAUER

## (undated) DEVICE — FORCEP BX LG CAP 2.8MMX240CM

## (undated) DEVICE — TROCAR KII FIOS 5MM X 100MM

## (undated) DEVICE — GLOVE SENSICARE PI GRN 7.5

## (undated) DEVICE — SPONGE GAUZE 16PLY 4X4

## (undated) DEVICE — UNDERPAD PROTECT PLUS 17X24IN

## (undated) DEVICE — BITE BLOCK ADULT LATEX FREE